# Patient Record
Sex: MALE | Race: WHITE | NOT HISPANIC OR LATINO | Employment: OTHER | URBAN - METROPOLITAN AREA
[De-identification: names, ages, dates, MRNs, and addresses within clinical notes are randomized per-mention and may not be internally consistent; named-entity substitution may affect disease eponyms.]

---

## 2017-04-18 ENCOUNTER — RESULTS ENCOUNTER (OUTPATIENT)
Dept: FAMILY MEDICINE CLINIC | Facility: CLINIC | Age: 67
End: 2017-04-18

## 2017-04-18 DIAGNOSIS — E55.9 VITAMIN D DEFICIENCY: ICD-10-CM

## 2017-04-19 DIAGNOSIS — R73.02 GLUCOSE INTOLERANCE (IMPAIRED GLUCOSE TOLERANCE): ICD-10-CM

## 2017-04-19 DIAGNOSIS — E78.5 HYPERLIPIDEMIA, UNSPECIFIED HYPERLIPIDEMIA TYPE: Primary | ICD-10-CM

## 2017-04-19 DIAGNOSIS — E55.9 VITAMIN D DEFICIENCY: ICD-10-CM

## 2017-04-19 DIAGNOSIS — R94.6 ABNORMAL FINDING ON THYROID FUNCTION TEST: ICD-10-CM

## 2017-04-20 LAB
25(OH)D3+25(OH)D2 SERPL-MCNC: >60 NG/ML
ALBUMIN SERPL-MCNC: 4.1 G/DL (ref 3.5–5.2)
ALBUMIN/GLOB SERPL: 1.2 G/DL
ALP SERPL-CCNC: 30 U/L (ref 40–129)
ALT SERPL-CCNC: 33 U/L (ref 5–41)
AST SERPL-CCNC: 25 U/L (ref 5–40)
BASOPHILS # BLD AUTO: 0.04 10*3/MM3 (ref 0–0.2)
BASOPHILS NFR BLD AUTO: 1.1 % (ref 0–2)
BILIRUB SERPL-MCNC: 0.5 MG/DL (ref 0.2–1.2)
BUN SERPL-MCNC: 17 MG/DL (ref 8–23)
BUN/CREAT SERPL: 15.6 (ref 7–25)
CALCIUM SERPL-MCNC: 9.4 MG/DL (ref 8.8–10.5)
CHLORIDE SERPL-SCNC: 105 MMOL/L (ref 98–107)
CHOLEST SERPL-MCNC: 180 MG/DL (ref 0–200)
CO2 SERPL-SCNC: 25.5 MMOL/L (ref 22–29)
CREAT SERPL-MCNC: 1.09 MG/DL (ref 0.76–1.27)
EOSINOPHIL # BLD AUTO: 0.14 10*3/MM3 (ref 0.1–0.3)
EOSINOPHIL NFR BLD AUTO: 3.9 % (ref 0–4)
ERYTHROCYTE [DISTWIDTH] IN BLOOD BY AUTOMATED COUNT: 13.4 % (ref 11.5–14.5)
GLOBULIN SER CALC-MCNC: 3.4 GM/DL
GLUCOSE SERPL-MCNC: 114 MG/DL (ref 65–99)
HBA1C MFR BLD: 5.7 % (ref 4.8–5.6)
HCT VFR BLD AUTO: 42.3 % (ref 42–52)
HDLC SERPL-MCNC: 54 MG/DL (ref 40–60)
HGB BLD-MCNC: 13.9 G/DL (ref 14–18)
IMM GRANULOCYTES # BLD: 0 10*3/MM3 (ref 0–0.03)
IMM GRANULOCYTES NFR BLD: 0 % (ref 0–0.5)
LDLC SERPL CALC-MCNC: 114 MG/DL (ref 0–100)
LYMPHOCYTES # BLD AUTO: 1.29 10*3/MM3 (ref 0.6–4.8)
LYMPHOCYTES NFR BLD AUTO: 36 % (ref 20–45)
MCH RBC QN AUTO: 29.6 PG (ref 27–31)
MCHC RBC AUTO-ENTMCNC: 32.9 G/DL (ref 31–37)
MCV RBC AUTO: 90.2 FL (ref 80–94)
MONOCYTES # BLD AUTO: 0.42 10*3/MM3 (ref 0–1)
MONOCYTES NFR BLD AUTO: 11.7 % (ref 3–8)
NEUTROPHILS # BLD AUTO: 1.69 10*3/MM3 (ref 1.5–8.3)
NEUTROPHILS NFR BLD AUTO: 47.3 % (ref 45–70)
NRBC BLD AUTO-RTO: 0 /100 WBC (ref 0–0)
PLATELET # BLD AUTO: 277 10*3/MM3 (ref 140–500)
POTASSIUM SERPL-SCNC: 4.5 MMOL/L (ref 3.5–5.2)
PROT SERPL-MCNC: 7.5 G/DL (ref 6–8.5)
RBC # BLD AUTO: 4.69 10*6/MM3 (ref 4.7–6.1)
SODIUM SERPL-SCNC: 141 MMOL/L (ref 136–145)
TRIGL SERPL-MCNC: 60 MG/DL (ref 0–150)
TSH SERPL DL<=0.005 MIU/L-ACNC: 2.87 MIU/ML (ref 0.27–4.2)
VLDLC SERPL CALC-MCNC: 12 MG/DL (ref 8–32)
WBC # BLD AUTO: 3.58 10*3/MM3 (ref 4.8–10.8)

## 2017-04-26 ENCOUNTER — OFFICE VISIT (OUTPATIENT)
Dept: FAMILY MEDICINE CLINIC | Facility: CLINIC | Age: 67
End: 2017-04-26

## 2017-04-26 VITALS
SYSTOLIC BLOOD PRESSURE: 130 MMHG | DIASTOLIC BLOOD PRESSURE: 78 MMHG | TEMPERATURE: 98.1 F | HEART RATE: 82 BPM | WEIGHT: 226 LBS | HEIGHT: 72 IN | OXYGEN SATURATION: 93 % | BODY MASS INDEX: 30.61 KG/M2

## 2017-04-26 DIAGNOSIS — E11.9 CONTROLLED TYPE 2 DIABETES MELLITUS WITHOUT COMPLICATION, WITHOUT LONG-TERM CURRENT USE OF INSULIN (HCC): ICD-10-CM

## 2017-04-26 DIAGNOSIS — E55.9 VITAMIN D DEFICIENCY: ICD-10-CM

## 2017-04-26 DIAGNOSIS — E66.09 EXOGENOUS OBESITY: ICD-10-CM

## 2017-04-26 DIAGNOSIS — Z00.00 HEALTH CARE MAINTENANCE: ICD-10-CM

## 2017-04-26 DIAGNOSIS — E78.5 HYPERLIPIDEMIA, UNSPECIFIED HYPERLIPIDEMIA TYPE: Primary | ICD-10-CM

## 2017-04-26 PROCEDURE — 99213 OFFICE O/P EST LOW 20 MIN: CPT | Performed by: FAMILY MEDICINE

## 2017-04-26 PROCEDURE — G0438 PPPS, INITIAL VISIT: HCPCS | Performed by: FAMILY MEDICINE

## 2017-04-26 RX ORDER — ATORVASTATIN CALCIUM 80 MG/1
80 TABLET, FILM COATED ORAL DAILY
Qty: 90 TABLET | Refills: 1 | Status: SHIPPED | OUTPATIENT
Start: 2017-04-26 | End: 2017-07-19 | Stop reason: SDUPTHER

## 2017-04-26 RX ORDER — METFORMIN HYDROCHLORIDE 500 MG/1
TABLET, EXTENDED RELEASE ORAL
Qty: 180 TABLET | Refills: 1 | Status: SHIPPED | OUTPATIENT
Start: 2017-04-26 | End: 2017-07-19

## 2017-04-26 RX ORDER — SIMVASTATIN 40 MG
40 TABLET ORAL NIGHTLY
Qty: 90 TABLET | Refills: 1 | Status: SHIPPED | OUTPATIENT
Start: 2017-04-26 | End: 2017-04-26

## 2017-04-26 NOTE — PROGRESS NOTES
QUICK REFERENCE INFORMATION:  The ABCs of the Annual Wellness Visit    Initial Medicare Wellness Visit    HEALTH RISK ASSESSMENT    1950    Recent Hospitalizations:  No recent hospitalization(s)..        Current Medical Providers:  Patient Care Team:  Papo Anand DO as PCP - General (Family Medicine)  Joby Schilling MD as PCP - Claims Attributed - PLEASE DO NOT REMOVE        Smoking Status:  History   Smoking Status   • Never Smoker   Smokeless Tobacco   • Not on file       Alcohol Consumption:  History   Alcohol Use   • 0.6 oz/week   • 1 Glasses of wine per week     Comment: I PER DAY       Depression Screen:   PHQ-9 Depression Screening 4/26/2017   Little interest or pleasure in doing things 0   Feeling down, depressed, or hopeless 0   Trouble falling or staying asleep, or sleeping too much 0   Feeling tired or having little energy 0   Poor appetite or overeating 0   Feeling bad about yourself - or that you are a failure or have let yourself or your family down 0   Trouble concentrating on things, such as reading the newspaper or watching television 0   Moving or speaking so slowly that other people could have noticed. Or the opposite - being so fidgety or restless that you have been moving around a lot more than usual 0   Thoughts that you would be better off dead, or of hurting yourself in some way 0   PHQ-9 Total Score 0       Health Habits and Functional and Cognitive Screening:  Functional & Cognitive Status 4/26/2017   Do you have difficulty preparing food and eating? No   Do you have difficulty bathing yourself? No   Do you have difficulty getting dressed? No   Do you have difficulty using the toilet? No   Do you have difficulty moving around from place to place? No   In the past year have you fallen or experienced a near fall? No   Do you need help using the phone?  No   Are you deaf or do you have serious difficulty hearing?  No   Do you need help with transportation? No   Do you need help  shopping? No   Do you need help preparing meals?  No   Do you need help with housework?  No   Do you need help with laundry? No   Do you need help taking your medications? No   Do you need help managing money? No       Health Habits  Current Diet: Well Balanced Diet  Dental Exam: Up to date  Eye Exam: Up to date  Exercise (times per week): 7 times per week          Does the patient have evidence of cognitive impairment? No    Asiprin use counseling: Taking ASA appropriately as indicated      Recent Lab Results:    Visual Acuity:  No exam data present    Age-appropriate Screening Schedule:  Refer to the list below for future screening recommendations based on patient's age, sex and/or medical conditions. Orders for these recommended tests are listed in the plan section. The patient has been provided with a written plan.    Health Maintenance   Topic Date Due   • TDAP/TD VACCINES (1 - Tdap) 07/12/1968   • DIABETIC FOOT EXAM  08/16/2016   • URINE MICROALBUMIN  08/16/2016   • DIABETIC EYE EXAM  10/03/2017   • PNEUMOCOCCAL VACCINES (65+ LOW/MEDIUM RISK) (2 of 2 - PPSV23) 10/18/2017   • LIPID PANEL  04/20/2018   • HEMOGLOBIN A1C  04/20/2018   • COLONOSCOPY  06/29/2026   • INFLUENZA VACCINE  Completed   • ZOSTER VACCINE  Completed        Subjective   History of Present Illness    Krishna Le is a 66 y.o. male who presents for an Annual Wellness Visit.    The following portions of the patient's history were reviewed and updated as appropriate: allergies, current medications, past family history, past medical history, past social history, past surgical history and problem list.    Outpatient Medications Prior to Visit   Medication Sig Dispense Refill   • aspirin 81 MG chewable tablet Chew.     • betamethasone dipropionate (DIPROLENE) 0.05 % ointment Apply  topically 2 (Two) Times a Day.     • cholecalciferol (VITAMIN D3) 1000 UNITS tablet Take 1,000 Units by mouth Daily.     • fenofibrate 160 MG tablet      • metFORMIN  XR (GLUCOPHAGE XR) 500 MG 24 hr tablet Take two tablets at one time each day 180 tablet 1   • Nutritional Supplements (GLUCOSAMINE FORTE) capsule Take  by mouth.     • sildenafil (VIAGRA) 100 MG tablet Take 1 tablet by mouth Daily As Needed for erectile dysfunction. 6 tablet 2   • simvastatin (ZOCOR) 40 MG tablet Take 1 tablet by mouth Every Night. 30 tablet 4   • tacrolimus (PROTOPIC) 0.1 % ointment        No facility-administered medications prior to visit.        Patient Active Problem List   Diagnosis   • Hyperlipidemia   • Abnormal finding on thyroid function test   • Glucose intolerance (impaired glucose tolerance)   • Normocytic normochromic anemia   • Erectile dysfunction   • Vitamin D deficiency       Advance Care Planning:  has an advance directive - a copy HAS NOT been provided    Identification of Risk Factors:  Risk factors include: weight , unhealthy diet and cardiovascular risk.    Review of Systems    Compared to one year ago, the patient feels his physical health is the same.  Compared to one year ago, the patient feels his mental health is the same.    Objective     Physical Exam   Constitutional: He is oriented to person, place, and time. He appears well-developed and well-nourished.   HENT:   Head: Normocephalic and atraumatic.   Neck: Trachea normal and phonation normal. Neck supple. Normal carotid pulses present. Carotid bruit is not present. No thyroid mass and no thyromegaly present.   Cardiovascular: Normal rate, regular rhythm and normal heart sounds.  Exam reveals no gallop and no friction rub.    No murmur heard.  Pulmonary/Chest: Effort normal and breath sounds normal. No respiratory distress. He has no decreased breath sounds. He has no wheezes. He has no rhonchi. He has no rales.   Lymphadenopathy:     He has no cervical adenopathy.   Neurological: He is alert and oriented to person, place, and time.   Skin: Skin is warm and dry. No rash noted.   Psychiatric: He has a normal mood and  "affect. His speech is normal and behavior is normal. Judgment and thought content normal. Cognition and memory are normal.   Nursing note and vitals reviewed.      Vitals:    04/26/17 1010   BP: 130/78   Pulse: 82   Temp: 98.1 °F (36.7 °C)   SpO2: 93%   Weight: 226 lb (103 kg)   Height: 72\" (182.9 cm)       Body mass index is 30.65 kg/(m^2).  Discussed the patient's BMI with him. The BMI is above average; no BMI management plan is appropriate..    Assessment/Plan   Patient Self-Management and Personalized Health Advice  The patient has been provided with information about: diet, exercise and weight management and preventive services including:   · Advance directive.    Visit Diagnoses:  No diagnosis found.    No orders of the defined types were placed in this encounter.      Outpatient Encounter Prescriptions as of 4/26/2017   Medication Sig Dispense Refill   • aspirin 81 MG chewable tablet Chew.     • betamethasone dipropionate (DIPROLENE) 0.05 % ointment Apply  topically 2 (Two) Times a Day.     • cholecalciferol (VITAMIN D3) 1000 UNITS tablet Take 1,000 Units by mouth Daily.     • fenofibrate 160 MG tablet      • metFORMIN XR (GLUCOPHAGE XR) 500 MG 24 hr tablet Take two tablets at one time each day 180 tablet 1   • Nutritional Supplements (GLUCOSAMINE FORTE) capsule Take  by mouth.     • sildenafil (VIAGRA) 100 MG tablet Take 1 tablet by mouth Daily As Needed for erectile dysfunction. 6 tablet 2   • simvastatin (ZOCOR) 40 MG tablet Take 1 tablet by mouth Every Night. 30 tablet 4   • tacrolimus (PROTOPIC) 0.1 % ointment        No facility-administered encounter medications on file as of 4/26/2017.        Reviewed use of high risk medication in the elderly: no  Reviewed for potential of harmful drug interactions in the elderly: no    Follow Up:  No Follow-up on file.     An After Visit Summary and PPPS with all of these plans were given to the patient.        Scribed for Papo Anand MD by Vahe Wilcox. " 04/26/2017    IPapo MD personally performed the services described in this documentation, as scribed by Vahe Wilcox in my presence, and it is both accurate and complete

## 2017-04-26 NOTE — PROGRESS NOTES
Subjective   Krishna Le is a 66 y.o. male with   Chief Complaint   Patient presents with   • Annual Exam     initial medicare wellness   .    History of Present Illness     Krishna is a 66 yr old white male that presents today for follow up of HLD, Vitamin D deficiency, and Type II Diabetes.  Currently Krishna uses Fenofibrate and Simvastatin to control cholesterol, Metformin to control DM.  Krishna sees Dr. Rosalio Weir of Urology. Patient is currently doing well and preparing for an extended vacation.  There are no acute complaints.     The following portions of the patient's history were reviewed and updated as appropriate: allergies, current medications, past family history, past medical history, past social history, past surgical history and problem list.    Review of Systems   Constitutional:        Exogenous obesity   Cardiovascular:        HLD   Endocrine:        Type II Diabetes  Vit. D Def.   Genitourinary:        Erectile dysfunction   All other systems reviewed and are negative.      Objective     Vitals:    04/26/17 1010   BP: 130/78   Pulse: 82   Temp: 98.1 °F (36.7 °C)   SpO2: 93%     BP Readings from Last 3 Encounters:   04/26/17 130/78   10/18/16 130/82   08/16/16 120/78      Wt Readings from Last 3 Encounters:   04/26/17 226 lb (103 kg)   10/18/16 222 lb (101 kg)   08/16/16 217 lb (98.4 kg)          Recent Results (from the past 168 hour(s))   CBC & Differential    Collection Time: 04/20/17  8:37 AM   Result Value Ref Range    WBC 3.58 (L) 4.80 - 10.80 10*3/mm3    RBC 4.69 (L) 4.70 - 6.10 10*6/mm3    Hemoglobin 13.9 (L) 14.0 - 18.0 g/dL    Hematocrit 42.3 42.0 - 52.0 %    MCV 90.2 80.0 - 94.0 fL    MCH 29.6 27.0 - 31.0 pg    MCHC 32.9 31.0 - 37.0 g/dL    RDW 13.4 11.5 - 14.5 %    Platelets 277 140 - 500 10*3/mm3    Neutrophil Rel % 47.3 45.0 - 70.0 %    Lymphocyte Rel % 36.0 20.0 - 45.0 %    Monocyte Rel % 11.7 (H) 3.0 - 8.0 %    Eosinophil Rel % 3.9 0.0 - 4.0 %    Basophil Rel % 1.1 0.0 -  2.0 %    Neutrophils Absolute 1.69 1.50 - 8.30 10*3/mm3    Lymphocytes Absolute 1.29 0.60 - 4.80 10*3/mm3    Monocytes Absolute 0.42 0.00 - 1.00 10*3/mm3    Eosinophils Absolute 0.14 0.10 - 0.30 10*3/mm3    Basophils Absolute 0.04 0.00 - 0.20 10*3/mm3    Immature Granulocyte Rel % 0.0 0.0 - 0.5 %    Immature Grans Absolute 0.00 0.00 - 0.03 10*3/mm3    nRBC 0.0 0.0 - 0.0 /100 WBC   Comprehensive Metabolic Panel    Collection Time: 04/20/17  8:37 AM   Result Value Ref Range    Glucose 114 (H) 65 - 99 mg/dL    BUN 17 8 - 23 mg/dL    Creatinine 1.09 0.76 - 1.27 mg/dL    eGFR Non African Am 67 >60 mL/min/1.73    eGFR African Am 82 >60 mL/min/1.73    BUN/Creatinine Ratio 15.6 7.0 - 25.0    Sodium 141 136 - 145 mmol/L    Potassium 4.5 3.5 - 5.2 mmol/L    Chloride 105 98 - 107 mmol/L    Total CO2 25.5 22.0 - 29.0 mmol/L    Calcium 9.4 8.8 - 10.5 mg/dL    Total Protein 7.5 6.0 - 8.5 g/dL    Albumin 4.10 3.50 - 5.20 g/dL    Globulin 3.4 gm/dL    A/G Ratio 1.2 g/dL    Total Bilirubin 0.5 0.2 - 1.2 mg/dL    Alkaline Phosphatase 30 (L) 40 - 129 U/L    AST (SGOT) 25 5 - 40 U/L    ALT (SGPT) 33 5 - 41 U/L   Hemoglobin A1c    Collection Time: 04/20/17  8:37 AM   Result Value Ref Range    Hemoglobin A1C 5.70 (H) 4.80 - 5.60 %   Lipid Panel    Collection Time: 04/20/17  8:37 AM   Result Value Ref Range    Total Cholesterol 180 0 - 200 mg/dL    Triglycerides 60 0 - 150 mg/dL    HDL Cholesterol 54 40 - 60 mg/dL    VLDL Cholesterol 12 8 - 32 mg/dL    LDL Cholesterol  114 (H) 0 - 100 mg/dL   TSH    Collection Time: 04/20/17  8:37 AM   Result Value Ref Range    TSH 2.870 0.270 - 4.200 mIU/mL   Vitamin D 25 Hydroxy    Collection Time: 04/20/17  8:37 AM   Result Value Ref Range    25 Hydroxy, Vitamin D >60.0 ng/mL     The above results have been reviewed and explained to the patient.  The patient has been provided a copy    Physical Exam   Constitutional: He is oriented to person, place, and time. He appears well-developed and  well-nourished.   HENT:   Head: Normocephalic and atraumatic.   Neck: Trachea normal and phonation normal. Neck supple. Normal carotid pulses present. Carotid bruit is not present. No thyroid mass and no thyromegaly present.   Cardiovascular: Normal rate, regular rhythm and normal heart sounds.  Exam reveals no gallop and no friction rub.    No murmur heard.  Pulmonary/Chest: Effort normal and breath sounds normal. No respiratory distress. He has no decreased breath sounds. He has no wheezes. He has no rhonchi. He has no rales.   Lymphadenopathy:     He has no cervical adenopathy.   Neurological: He is alert and oriented to person, place, and time.   Skin: Skin is warm and dry. No rash noted.   Psychiatric: He has a normal mood and affect. His speech is normal and behavior is normal. Judgment and thought content normal. Cognition and memory are normal.   Nursing note and vitals reviewed.      Assessment/Plan   Krishna was seen today for annual exam.    Diagnoses and all orders for this visit:    Hyperlipidemia, unspecified hyperlipidemia type  -     CBC & Differential; Future  -     Comprehensive Metabolic Panel; Future  -     Lipid Panel; Future    Controlled type 2 diabetes mellitus without complication, without long-term current use of insulin  -     CBC & Differential; Future  -     Comprehensive Metabolic Panel; Future  -     Hemoglobin A1c; Future    Vitamin D deficiency  -     CBC & Differential; Future  -     Comprehensive Metabolic Panel; Future  -     Vitamin D 25 Hydroxy; Future    Health care maintenance  -     PSA; Future    Exogenous obesity    Other orders  -     metFORMIN ER (GLUCOPHAGE XR) 500 MG 24 hr tablet; Take two tablets at one time each day  -     Discontinue: simvastatin (ZOCOR) 40 MG tablet; Take 1 tablet by mouth Every Night.  -     atorvastatin (LIPITOR) 80 MG tablet; Take 1 tablet by mouth Daily.        Return in about 3 months (around 7/26/2017).    Scribed for Papo Anand MD by  Vahe Wilcox. 04/26/2017    I, Papo Anand MD personally performed the services described in this documentation, as scribed by Vahe Wilcox in my presence, and it is both accurate and complete

## 2017-04-27 RX ORDER — FENOFIBRATE 160 MG/1
160 TABLET ORAL DAILY
Qty: 90 TABLET | Refills: 1 | Status: SHIPPED | OUTPATIENT
Start: 2017-04-27 | End: 2017-07-19 | Stop reason: SDUPTHER

## 2017-07-11 DIAGNOSIS — E55.9 VITAMIN D DEFICIENCY: ICD-10-CM

## 2017-07-11 DIAGNOSIS — Z00.00 HEALTH CARE MAINTENANCE: ICD-10-CM

## 2017-07-11 DIAGNOSIS — E78.5 HYPERLIPIDEMIA, UNSPECIFIED HYPERLIPIDEMIA TYPE: ICD-10-CM

## 2017-07-11 DIAGNOSIS — E11.9 CONTROLLED TYPE 2 DIABETES MELLITUS WITHOUT COMPLICATION, WITHOUT LONG-TERM CURRENT USE OF INSULIN (HCC): ICD-10-CM

## 2017-07-11 LAB
25(OH)D3+25(OH)D2 SERPL-MCNC: 57 NG/ML
ALBUMIN SERPL-MCNC: 4.5 G/DL (ref 3.5–5.2)
ALBUMIN/GLOB SERPL: 1.4 G/DL
ALP SERPL-CCNC: 30 U/L (ref 40–129)
ALT SERPL-CCNC: 46 U/L (ref 5–41)
AST SERPL-CCNC: 33 U/L (ref 5–40)
BASOPHILS # BLD AUTO: 0.04 10*3/MM3 (ref 0–0.2)
BASOPHILS NFR BLD AUTO: 0.9 % (ref 0–2)
BILIRUB SERPL-MCNC: 0.6 MG/DL (ref 0.2–1.2)
BUN SERPL-MCNC: 18 MG/DL (ref 8–23)
BUN/CREAT SERPL: 15.9 (ref 7–25)
CALCIUM SERPL-MCNC: 9.6 MG/DL (ref 8.8–10.5)
CHLORIDE SERPL-SCNC: 104 MMOL/L (ref 98–107)
CHOLEST SERPL-MCNC: 169 MG/DL (ref 0–200)
CO2 SERPL-SCNC: 23.3 MMOL/L (ref 22–29)
CREAT SERPL-MCNC: 1.13 MG/DL (ref 0.76–1.27)
EOSINOPHIL # BLD AUTO: 0.16 10*3/MM3 (ref 0.1–0.3)
EOSINOPHIL NFR BLD AUTO: 3.5 % (ref 0–4)
ERYTHROCYTE [DISTWIDTH] IN BLOOD BY AUTOMATED COUNT: 13.6 % (ref 11.5–14.5)
GLOBULIN SER CALC-MCNC: 3.3 GM/DL
GLUCOSE SERPL-MCNC: 95 MG/DL (ref 65–99)
HBA1C MFR BLD: 5.5 % (ref 4.8–5.6)
HCT VFR BLD AUTO: 44.1 % (ref 42–52)
HDLC SERPL-MCNC: 66 MG/DL (ref 40–60)
HGB BLD-MCNC: 14.5 G/DL (ref 14–18)
IMM GRANULOCYTES # BLD: 0.01 10*3/MM3 (ref 0–0.03)
IMM GRANULOCYTES NFR BLD: 0.2 % (ref 0–0.5)
LDLC SERPL CALC-MCNC: 91 MG/DL (ref 0–100)
LYMPHOCYTES # BLD AUTO: 1.54 10*3/MM3 (ref 0.6–4.8)
LYMPHOCYTES NFR BLD AUTO: 33.3 % (ref 20–45)
MCH RBC QN AUTO: 29.8 PG (ref 27–31)
MCHC RBC AUTO-ENTMCNC: 32.9 G/DL (ref 31–37)
MCV RBC AUTO: 90.7 FL (ref 80–94)
MONOCYTES # BLD AUTO: 0.42 10*3/MM3 (ref 0–1)
MONOCYTES NFR BLD AUTO: 9.1 % (ref 3–8)
NEUTROPHILS # BLD AUTO: 2.46 10*3/MM3 (ref 1.5–8.3)
NEUTROPHILS NFR BLD AUTO: 53 % (ref 45–70)
NRBC BLD AUTO-RTO: 0 /100 WBC (ref 0–0)
PLATELET # BLD AUTO: 296 10*3/MM3 (ref 140–500)
POTASSIUM SERPL-SCNC: 4.9 MMOL/L (ref 3.5–5.2)
PROT SERPL-MCNC: 7.8 G/DL (ref 6–8.5)
PSA SERPL-MCNC: 2.63 NG/ML (ref 0–4)
RBC # BLD AUTO: 4.86 10*6/MM3 (ref 4.7–6.1)
SODIUM SERPL-SCNC: 140 MMOL/L (ref 136–145)
TRIGL SERPL-MCNC: 61 MG/DL (ref 0–150)
VLDLC SERPL CALC-MCNC: 12.2 MG/DL (ref 8–32)
WBC # BLD AUTO: 4.63 10*3/MM3 (ref 4.8–10.8)

## 2017-07-19 ENCOUNTER — OFFICE VISIT (OUTPATIENT)
Dept: FAMILY MEDICINE CLINIC | Facility: CLINIC | Age: 67
End: 2017-07-19

## 2017-07-19 VITALS
HEIGHT: 72 IN | HEART RATE: 86 BPM | WEIGHT: 221 LBS | OXYGEN SATURATION: 100 % | BODY MASS INDEX: 29.93 KG/M2 | DIASTOLIC BLOOD PRESSURE: 72 MMHG | TEMPERATURE: 98.5 F | SYSTOLIC BLOOD PRESSURE: 130 MMHG

## 2017-07-19 DIAGNOSIS — E55.9 VITAMIN D DEFICIENCY: ICD-10-CM

## 2017-07-19 DIAGNOSIS — M25.512 CHRONIC PAIN OF BOTH SHOULDERS: ICD-10-CM

## 2017-07-19 DIAGNOSIS — E78.5 HYPERLIPIDEMIA, UNSPECIFIED HYPERLIPIDEMIA TYPE: Primary | ICD-10-CM

## 2017-07-19 DIAGNOSIS — M25.511 CHRONIC PAIN OF BOTH SHOULDERS: ICD-10-CM

## 2017-07-19 DIAGNOSIS — G89.29 CHRONIC PAIN OF BOTH SHOULDERS: ICD-10-CM

## 2017-07-19 DIAGNOSIS — E11.9 CONTROLLED TYPE 2 DIABETES MELLITUS WITHOUT COMPLICATION, WITHOUT LONG-TERM CURRENT USE OF INSULIN (HCC): ICD-10-CM

## 2017-07-19 PROCEDURE — 99213 OFFICE O/P EST LOW 20 MIN: CPT | Performed by: FAMILY MEDICINE

## 2017-07-19 RX ORDER — FENOFIBRATE 160 MG/1
160 TABLET ORAL DAILY
Qty: 90 TABLET | Refills: 1 | Status: SHIPPED | OUTPATIENT
Start: 2017-07-19 | End: 2017-11-15 | Stop reason: SDUPTHER

## 2017-07-19 RX ORDER — ATORVASTATIN CALCIUM 80 MG/1
80 TABLET, FILM COATED ORAL DAILY
Qty: 90 TABLET | Refills: 1 | Status: SHIPPED | OUTPATIENT
Start: 2017-07-19 | End: 2017-11-15 | Stop reason: SDUPTHER

## 2017-11-07 DIAGNOSIS — E55.9 VITAMIN D DEFICIENCY: ICD-10-CM

## 2017-11-07 DIAGNOSIS — E11.9 CONTROLLED TYPE 2 DIABETES MELLITUS WITHOUT COMPLICATION, WITHOUT LONG-TERM CURRENT USE OF INSULIN (HCC): ICD-10-CM

## 2017-11-07 DIAGNOSIS — E78.5 HYPERLIPIDEMIA, UNSPECIFIED HYPERLIPIDEMIA TYPE: ICD-10-CM

## 2017-11-08 LAB
25(OH)D3+25(OH)D2 SERPL-MCNC: 57.4 NG/ML
ALBUMIN SERPL-MCNC: 4.4 G/DL (ref 3.5–5.2)
ALBUMIN/GLOB SERPL: 1.2 G/DL
ALP SERPL-CCNC: 31 U/L (ref 40–129)
ALT SERPL-CCNC: 32 U/L (ref 5–41)
AST SERPL-CCNC: 27 U/L (ref 5–40)
BASOPHILS # BLD AUTO: 0.04 10*3/MM3 (ref 0–0.2)
BASOPHILS NFR BLD AUTO: 1 % (ref 0–2)
BILIRUB SERPL-MCNC: 0.6 MG/DL (ref 0.2–1.2)
BUN SERPL-MCNC: 20 MG/DL (ref 8–23)
BUN/CREAT SERPL: 18.9 (ref 7–25)
CALCIUM SERPL-MCNC: 9.6 MG/DL (ref 8.8–10.5)
CHLORIDE SERPL-SCNC: 104 MMOL/L (ref 98–107)
CHOLEST SERPL-MCNC: 171 MG/DL (ref 0–200)
CO2 SERPL-SCNC: 26.2 MMOL/L (ref 22–29)
CREAT SERPL-MCNC: 1.06 MG/DL (ref 0.76–1.27)
EOSINOPHIL # BLD AUTO: 0.14 10*3/MM3 (ref 0.1–0.3)
EOSINOPHIL NFR BLD AUTO: 3.4 % (ref 0–4)
ERYTHROCYTE [DISTWIDTH] IN BLOOD BY AUTOMATED COUNT: 13.4 % (ref 11.5–14.5)
GFR SERPLBLD CREATININE-BSD FMLA CKD-EPI: 69 ML/MIN/1.73
GFR SERPLBLD CREATININE-BSD FMLA CKD-EPI: 84 ML/MIN/1.73
GLOBULIN SER CALC-MCNC: 3.6 GM/DL
GLUCOSE SERPL-MCNC: 105 MG/DL (ref 65–99)
HBA1C MFR BLD: 6 % (ref 4.8–5.6)
HCT VFR BLD AUTO: 43.7 % (ref 42–52)
HDLC SERPL-MCNC: 56 MG/DL (ref 40–60)
HGB BLD-MCNC: 14.5 G/DL (ref 14–18)
IMM GRANULOCYTES # BLD: 0.01 10*3/MM3 (ref 0–0.03)
IMM GRANULOCYTES NFR BLD: 0.2 % (ref 0–0.5)
LDLC SERPL CALC-MCNC: 103 MG/DL (ref 0–100)
LYMPHOCYTES # BLD AUTO: 1.36 10*3/MM3 (ref 0.6–4.8)
LYMPHOCYTES NFR BLD AUTO: 33.5 % (ref 20–45)
MCH RBC QN AUTO: 30.4 PG (ref 27–31)
MCHC RBC AUTO-ENTMCNC: 33.2 G/DL (ref 31–37)
MCV RBC AUTO: 91.6 FL (ref 80–94)
MONOCYTES # BLD AUTO: 0.42 10*3/MM3 (ref 0–1)
MONOCYTES NFR BLD AUTO: 10.3 % (ref 3–8)
NEUTROPHILS # BLD AUTO: 2.09 10*3/MM3 (ref 1.5–8.3)
NEUTROPHILS NFR BLD AUTO: 51.6 % (ref 45–70)
NRBC BLD AUTO-RTO: 0 /100 WBC (ref 0–0)
PLATELET # BLD AUTO: 260 10*3/MM3 (ref 140–500)
POTASSIUM SERPL-SCNC: 4.7 MMOL/L (ref 3.5–5.2)
PROT SERPL-MCNC: 8 G/DL (ref 6–8.5)
RBC # BLD AUTO: 4.77 10*6/MM3 (ref 4.7–6.1)
SODIUM SERPL-SCNC: 142 MMOL/L (ref 136–145)
TRIGL SERPL-MCNC: 61 MG/DL (ref 0–150)
VLDLC SERPL CALC-MCNC: 12.2 MG/DL (ref 8–32)
WBC # BLD AUTO: 4.06 10*3/MM3 (ref 4.8–10.8)

## 2017-11-15 ENCOUNTER — OFFICE VISIT (OUTPATIENT)
Dept: FAMILY MEDICINE CLINIC | Facility: CLINIC | Age: 67
End: 2017-11-15

## 2017-11-15 VITALS
HEIGHT: 72 IN | BODY MASS INDEX: 31.02 KG/M2 | OXYGEN SATURATION: 97 % | TEMPERATURE: 98.6 F | SYSTOLIC BLOOD PRESSURE: 124 MMHG | DIASTOLIC BLOOD PRESSURE: 74 MMHG | WEIGHT: 229 LBS | HEART RATE: 94 BPM

## 2017-11-15 DIAGNOSIS — E66.09 EXOGENOUS OBESITY: ICD-10-CM

## 2017-11-15 DIAGNOSIS — Z00.00 HEALTH CARE MAINTENANCE: ICD-10-CM

## 2017-11-15 DIAGNOSIS — R94.6 ABNORMAL FINDING ON THYROID FUNCTION TEST: ICD-10-CM

## 2017-11-15 DIAGNOSIS — E55.9 VITAMIN D DEFICIENCY: ICD-10-CM

## 2017-11-15 DIAGNOSIS — R73.02 GLUCOSE INTOLERANCE (IMPAIRED GLUCOSE TOLERANCE): ICD-10-CM

## 2017-11-15 DIAGNOSIS — E78.5 HYPERLIPIDEMIA, UNSPECIFIED HYPERLIPIDEMIA TYPE: Primary | ICD-10-CM

## 2017-11-15 PROCEDURE — 99213 OFFICE O/P EST LOW 20 MIN: CPT | Performed by: FAMILY MEDICINE

## 2017-11-15 RX ORDER — ATORVASTATIN CALCIUM 80 MG/1
80 TABLET, FILM COATED ORAL DAILY
Qty: 90 TABLET | Refills: 1 | Status: SHIPPED | OUTPATIENT
Start: 2017-11-15 | End: 2018-04-17 | Stop reason: SDUPTHER

## 2017-11-15 RX ORDER — FENOFIBRATE 160 MG/1
160 TABLET ORAL DAILY
Qty: 90 TABLET | Refills: 1 | Status: SHIPPED | OUTPATIENT
Start: 2017-11-15 | End: 2018-06-20 | Stop reason: SDUPTHER

## 2017-11-15 NOTE — PROGRESS NOTES
Subjective   Krishna Le is a 67 y.o. male with   Chief Complaint   Patient presents with   • Hyperlipidemia     follow up labs   .    History of Present Illness     Patient presents for follow up of stable HLD, stable Type II Diabetes and stable Vitamin D Deficiency.  Patient continues to tolerate current medications well.  Patient has gained weight since last visit.  He reports that he had a month long trip overseas and has started walking a couple of miles a day since returning.  He was using Metformin but states that he discontinued using it and the pain in his left arm that he was having resolved.  He is otherwise without complaint.     The following portions of the patient's history were reviewed and updated as appropriate: allergies, current medications, past family history, past medical history, past social history, past surgical history and problem list.    Review of Systems   Cardiovascular: Negative for chest pain, palpitations and leg swelling.        HLD   Endocrine: Negative for cold intolerance and heat intolerance.        Type II Diabetes  Vit D Def   All other systems reviewed and are negative.      Objective     Vitals:    11/15/17 1359   BP: 124/74   Pulse: 94   Temp: 98.6 °F (37 °C)   SpO2: 97%     BP Readings from Last 3 Encounters:   11/15/17 124/74   07/19/17 130/72   04/26/17 130/78      Wt Readings from Last 3 Encounters:   11/15/17 229 lb (104 kg)   07/19/17 221 lb (100 kg)   04/26/17 226 lb (103 kg)      Orders Only on 11/07/2017   Component Date Value Ref Range Status   • Total Cholesterol 11/08/2017 171  0 - 200 mg/dL Final   • Triglycerides 11/08/2017 61  0 - 150 mg/dL Final   • HDL Cholesterol 11/08/2017 56  40 - 60 mg/dL Final   • VLDL Cholesterol 11/08/2017 12.2  8 - 32 mg/dL Final   • LDL Cholesterol  11/08/2017 103* 0 - 100 mg/dL Final   • 25 Hydroxy, Vitamin D 11/08/2017 57.4  ng/mL Final    Comment: Reference Range for Total Vitamin D 25(OH)  Deficiency    <20.0  ng/mL  Insufficiency 21-29 ng/mL  Sufficiency   30-74 ng/mL     • WBC 11/08/2017 4.06* 4.80 - 10.80 10*3/mm3 Final   • RBC 11/08/2017 4.77  4.70 - 6.10 10*6/mm3 Final   • Hemoglobin 11/08/2017 14.5  14.0 - 18.0 g/dL Final   • Hematocrit 11/08/2017 43.7  42.0 - 52.0 % Final   • MCV 11/08/2017 91.6  80.0 - 94.0 fL Final   • MCH 11/08/2017 30.4  27.0 - 31.0 pg Final   • MCHC 11/08/2017 33.2  31.0 - 37.0 g/dL Final   • RDW 11/08/2017 13.4  11.5 - 14.5 % Final   • Platelets 11/08/2017 260  140 - 500 10*3/mm3 Final   • Neutrophil Rel % 11/08/2017 51.6  45.0 - 70.0 % Final   • Lymphocyte Rel % 11/08/2017 33.5  20.0 - 45.0 % Final   • Monocyte Rel % 11/08/2017 10.3* 3.0 - 8.0 % Final   • Eosinophil Rel % 11/08/2017 3.4  0.0 - 4.0 % Final   • Basophil Rel % 11/08/2017 1.0  0.0 - 2.0 % Final   • Neutrophils Absolute 11/08/2017 2.09  1.50 - 8.30 10*3/mm3 Final   • Lymphocytes Absolute 11/08/2017 1.36  0.60 - 4.80 10*3/mm3 Final   • Monocytes Absolute 11/08/2017 0.42  0.00 - 1.00 10*3/mm3 Final   • Eosinophils Absolute 11/08/2017 0.14  0.10 - 0.30 10*3/mm3 Final   • Basophils Absolute 11/08/2017 0.04  0.00 - 0.20 10*3/mm3 Final   • Immature Granulocyte Rel % 11/08/2017 0.2  0.0 - 0.5 % Final   • Immature Grans Absolute 11/08/2017 0.01  0.00 - 0.03 10*3/mm3 Final   • nRBC 11/08/2017 0.0  0.0 - 0.0 /100 WBC Final   • Glucose 11/08/2017 105* 65 - 99 mg/dL Final   • BUN 11/08/2017 20  8 - 23 mg/dL Final   • Creatinine 11/08/2017 1.06  0.76 - 1.27 mg/dL Final   • eGFR Non  Am 11/08/2017 69  >60 mL/min/1.73 Final   • eGFR African Am 11/08/2017 84  >60 mL/min/1.73 Final   • BUN/Creatinine Ratio 11/08/2017 18.9  7.0 - 25.0 Final   • Sodium 11/08/2017 142  136 - 145 mmol/L Final   • Potassium 11/08/2017 4.7  3.5 - 5.2 mmol/L Final   • Chloride 11/08/2017 104  98 - 107 mmol/L Final   • Total CO2 11/08/2017 26.2  22.0 - 29.0 mmol/L Final   • Calcium 11/08/2017 9.6  8.8 - 10.5 mg/dL Final   • Total Protein 11/08/2017 8.0  6.0 - 8.5  g/dL Final   • Albumin 11/08/2017 4.40  3.50 - 5.20 g/dL Final   • Globulin 11/08/2017 3.6  gm/dL Final   • A/G Ratio 11/08/2017 1.2  g/dL Final   • Total Bilirubin 11/08/2017 0.6  0.2 - 1.2 mg/dL Final   • Alkaline Phosphatase 11/08/2017 31* 40 - 129 U/L Final   • AST (SGOT) 11/08/2017 27  5 - 40 U/L Final   • ALT (SGPT) 11/08/2017 32  5 - 41 U/L Final   • Hemoglobin A1C 11/08/2017 6.00* 4.80 - 5.60 % Final       Physical Exam   Constitutional: He is oriented to person, place, and time. He appears well-developed and well-nourished.   HENT:   Head: Normocephalic and atraumatic.   Neck: Trachea normal and phonation normal. Neck supple. Normal carotid pulses present. Carotid bruit is not present. No thyroid mass and no thyromegaly present.   Cardiovascular: Normal rate, regular rhythm and normal heart sounds.  Exam reveals no gallop and no friction rub.    No murmur heard.  Pulmonary/Chest: Effort normal and breath sounds normal. No respiratory distress. He has no decreased breath sounds. He has no wheezes. He has no rhonchi. He has no rales.   Lymphadenopathy:     He has no cervical adenopathy.   Neurological: He is alert and oriented to person, place, and time.   Skin: Skin is warm and dry. No rash noted.   Psychiatric: He has a normal mood and affect. His speech is normal and behavior is normal. Judgment and thought content normal. Cognition and memory are normal.   Nursing note and vitals reviewed.      Assessment/Plan   Krishna was seen today for hyperlipidemia.    Diagnoses and all orders for this visit:    Hyperlipidemia, unspecified hyperlipidemia type  -     CBC & Differential; Future  -     Comprehensive Metabolic Panel; Future  -     Lipid Panel; Future    Vitamin D deficiency  -     CBC & Differential; Future  -     Comprehensive Metabolic Panel; Future  -     Vitamin D 25 Hydroxy; Future    Glucose intolerance (impaired glucose tolerance)  -     CBC & Differential; Future  -     Comprehensive Metabolic  Panel; Future  -     Hemoglobin A1c; Future    Abnormal finding on thyroid function test  -     TSH; Future    Health care maintenance  -     Tdap (ADACEL) 5-2-15.5 LF-MCG/0.5 injection; Inject 0.5 mL into the shoulder, thigh, or buttocks 1 (One) Time for 1 dose.    Exogenous obesity    Other orders  -     atorvastatin (LIPITOR) 80 MG tablet; Take 1 tablet by mouth Daily.  -     fenofibrate 160 MG tablet; Take 1 tablet by mouth Daily.        Return in about 5 months (around 4/15/2018).    Scribed for Papo Anand MD by Vahe Wilcox. 11/15/2017    IPapo MD personally performed the services described in this documentation, as scribed by Vahe Wilcox in my presence, and it is both accurate and complete

## 2017-11-18 PROBLEM — E11.9 CONTROLLED TYPE 2 DIABETES MELLITUS WITHOUT COMPLICATION, WITHOUT LONG-TERM CURRENT USE OF INSULIN: Status: RESOLVED | Noted: 2017-04-26 | Resolved: 2017-11-18

## 2018-04-05 DIAGNOSIS — R73.02 GLUCOSE INTOLERANCE (IMPAIRED GLUCOSE TOLERANCE): ICD-10-CM

## 2018-04-05 DIAGNOSIS — E78.5 HYPERLIPIDEMIA, UNSPECIFIED HYPERLIPIDEMIA TYPE: Primary | ICD-10-CM

## 2018-04-05 DIAGNOSIS — E55.9 VITAMIN D DEFICIENCY: ICD-10-CM

## 2018-04-09 LAB
25(OH)D3+25(OH)D2 SERPL-MCNC: 55.7 NG/ML
ALBUMIN SERPL-MCNC: 4.3 G/DL (ref 3.5–5.2)
ALBUMIN/GLOB SERPL: 1.2 G/DL
ALP SERPL-CCNC: 38 U/L (ref 40–129)
ALT SERPL-CCNC: 38 U/L (ref 5–41)
AST SERPL-CCNC: 25 U/L (ref 5–40)
BASOPHILS # BLD AUTO: 0.05 10*3/MM3 (ref 0–0.2)
BASOPHILS NFR BLD AUTO: 1.1 % (ref 0–2)
BILIRUB SERPL-MCNC: 0.5 MG/DL (ref 0.2–1.2)
BUN SERPL-MCNC: 15 MG/DL (ref 8–23)
BUN/CREAT SERPL: 15.3 (ref 7–25)
CALCIUM SERPL-MCNC: 9.8 MG/DL (ref 8.8–10.5)
CHLORIDE SERPL-SCNC: 104 MMOL/L (ref 98–107)
CHOLEST SERPL-MCNC: 179 MG/DL (ref 0–200)
CO2 SERPL-SCNC: 26.9 MMOL/L (ref 22–29)
CREAT SERPL-MCNC: 0.98 MG/DL (ref 0.76–1.27)
EOSINOPHIL # BLD AUTO: 0.15 10*3/MM3 (ref 0.1–0.3)
EOSINOPHIL NFR BLD AUTO: 3.3 % (ref 0–4)
ERYTHROCYTE [DISTWIDTH] IN BLOOD BY AUTOMATED COUNT: 13.6 % (ref 11.5–14.5)
GFR SERPLBLD CREATININE-BSD FMLA CKD-EPI: 76 ML/MIN/1.73
GFR SERPLBLD CREATININE-BSD FMLA CKD-EPI: 92 ML/MIN/1.73
GLOBULIN SER CALC-MCNC: 3.6 GM/DL
GLUCOSE SERPL-MCNC: 125 MG/DL (ref 65–99)
HBA1C MFR BLD: 6 % (ref 4.8–5.6)
HCT VFR BLD AUTO: 46 % (ref 42–52)
HDLC SERPL-MCNC: 55 MG/DL (ref 40–60)
HGB BLD-MCNC: 15.3 G/DL (ref 14–18)
IMM GRANULOCYTES # BLD: 0 10*3/MM3 (ref 0–0.03)
IMM GRANULOCYTES NFR BLD: 0 % (ref 0–0.5)
LDLC SERPL CALC-MCNC: 108 MG/DL (ref 0–100)
LYMPHOCYTES # BLD AUTO: 1.29 10*3/MM3 (ref 0.6–4.8)
LYMPHOCYTES NFR BLD AUTO: 28.6 % (ref 20–45)
MCH RBC QN AUTO: 30.5 PG (ref 27–31)
MCHC RBC AUTO-ENTMCNC: 33.3 G/DL (ref 31–37)
MCV RBC AUTO: 91.8 FL (ref 80–94)
MONOCYTES # BLD AUTO: 0.45 10*3/MM3 (ref 0–1)
MONOCYTES NFR BLD AUTO: 10 % (ref 3–8)
NEUTROPHILS # BLD AUTO: 2.57 10*3/MM3 (ref 1.5–8.3)
NEUTROPHILS NFR BLD AUTO: 57 % (ref 45–70)
NRBC BLD AUTO-RTO: 0 /100 WBC (ref 0–0)
PLATELET # BLD AUTO: 281 10*3/MM3 (ref 140–500)
POTASSIUM SERPL-SCNC: 4.7 MMOL/L (ref 3.5–5.2)
PROT SERPL-MCNC: 7.9 G/DL (ref 6–8.5)
RBC # BLD AUTO: 5.01 10*6/MM3 (ref 4.7–6.1)
SODIUM SERPL-SCNC: 141 MMOL/L (ref 136–145)
TRIGL SERPL-MCNC: 80 MG/DL (ref 0–150)
VLDLC SERPL CALC-MCNC: 16 MG/DL (ref 8–32)
WBC # BLD AUTO: 4.51 10*3/MM3 (ref 4.8–10.8)

## 2018-04-15 ENCOUNTER — RESULTS ENCOUNTER (OUTPATIENT)
Dept: FAMILY MEDICINE CLINIC | Facility: CLINIC | Age: 68
End: 2018-04-15

## 2018-04-15 DIAGNOSIS — E78.5 HYPERLIPIDEMIA, UNSPECIFIED HYPERLIPIDEMIA TYPE: ICD-10-CM

## 2018-04-15 DIAGNOSIS — E55.9 VITAMIN D DEFICIENCY: ICD-10-CM

## 2018-04-15 DIAGNOSIS — R94.6 ABNORMAL FINDING ON THYROID FUNCTION TEST: ICD-10-CM

## 2018-04-15 DIAGNOSIS — R73.02 GLUCOSE INTOLERANCE (IMPAIRED GLUCOSE TOLERANCE): ICD-10-CM

## 2018-04-17 ENCOUNTER — OFFICE VISIT (OUTPATIENT)
Dept: FAMILY MEDICINE CLINIC | Facility: CLINIC | Age: 68
End: 2018-04-17

## 2018-04-17 VITALS
DIASTOLIC BLOOD PRESSURE: 80 MMHG | HEART RATE: 85 BPM | RESPIRATION RATE: 16 BRPM | WEIGHT: 233.5 LBS | HEIGHT: 72 IN | SYSTOLIC BLOOD PRESSURE: 130 MMHG | OXYGEN SATURATION: 94 % | BODY MASS INDEX: 31.63 KG/M2 | TEMPERATURE: 98 F

## 2018-04-17 DIAGNOSIS — E55.9 VITAMIN D DEFICIENCY: ICD-10-CM

## 2018-04-17 DIAGNOSIS — E78.5 HYPERLIPIDEMIA, UNSPECIFIED HYPERLIPIDEMIA TYPE: Primary | ICD-10-CM

## 2018-04-17 DIAGNOSIS — Z12.5 ENCOUNTER FOR SPECIAL SCREENING EXAMINATION FOR NEOPLASM OF PROSTATE: ICD-10-CM

## 2018-04-17 DIAGNOSIS — R73.02 GLUCOSE INTOLERANCE (IMPAIRED GLUCOSE TOLERANCE): ICD-10-CM

## 2018-04-17 DIAGNOSIS — Z00.00 HEALTHCARE MAINTENANCE: ICD-10-CM

## 2018-04-17 PROCEDURE — 99213 OFFICE O/P EST LOW 20 MIN: CPT | Performed by: FAMILY MEDICINE

## 2018-04-17 RX ORDER — ASCORBIC ACID 500 MG
500 TABLET ORAL DAILY
COMMUNITY

## 2018-04-17 RX ORDER — ATORVASTATIN CALCIUM 80 MG/1
80 TABLET, FILM COATED ORAL DAILY
Qty: 90 TABLET | Refills: 1 | Status: SHIPPED | OUTPATIENT
Start: 2018-04-17 | End: 2018-10-23 | Stop reason: SDUPTHER

## 2018-04-17 NOTE — PROGRESS NOTES
Subjective   Krishna Le is a 67 y.o. male with   Chief Complaint   Patient presents with   • Hyperlipidemia     lab f/u   .    History of Present Illness   Pt presents for stable glucose intolerance, stable HLD and stable vitamin D def.  Pt has lost some weight since last visit, he states he is really unable to exercise much at this time.  Pt states the day he stopped taking Metformin his arms stopped hurting.  Pt is tolerating medications well.    The following portions of the patient's history were reviewed and updated as appropriate: allergies, current medications, past family history, past medical history, past social history, past surgical history and problem list.    Review of Systems   Cardiovascular: Negative for chest pain, palpitations and leg swelling.        HLD   Endocrine: Negative for cold intolerance and heat intolerance.        Vitamin D Def.  Glucose intolerance     All other systems reviewed and are negative.      Objective     Vitals:    04/17/18 1337   BP: 130/80   Pulse: 85   Resp: 16   Temp: 98 °F (36.7 °C)   SpO2: 94%     BP Readings from Last 3 Encounters:   04/17/18 130/80   11/15/17 124/74   07/19/17 130/72      Wt Readings from Last 3 Encounters:   04/17/18 106 kg (233 lb 8 oz)   11/15/17 104 kg (229 lb)   07/19/17 100 kg (221 lb)      Orders Only on 04/05/2018   Component Date Value Ref Range Status   • WBC 04/09/2018 4.51* 4.80 - 10.80 10*3/mm3 Final   • RBC 04/09/2018 5.01  4.70 - 6.10 10*6/mm3 Final   • Hemoglobin 04/09/2018 15.3  14.0 - 18.0 g/dL Final   • Hematocrit 04/09/2018 46.0  42.0 - 52.0 % Final   • MCV 04/09/2018 91.8  80.0 - 94.0 fL Final   • MCH 04/09/2018 30.5  27.0 - 31.0 pg Final   • MCHC 04/09/2018 33.3  31.0 - 37.0 g/dL Final   • RDW 04/09/2018 13.6  11.5 - 14.5 % Final   • Platelets 04/09/2018 281  140 - 500 10*3/mm3 Final   • Neutrophil Rel % 04/09/2018 57.0  45.0 - 70.0 % Final   • Lymphocyte Rel % 04/09/2018 28.6  20.0 - 45.0 % Final   • Monocyte Rel %  04/09/2018 10.0* 3.0 - 8.0 % Final   • Eosinophil Rel % 04/09/2018 3.3  0.0 - 4.0 % Final   • Basophil Rel % 04/09/2018 1.1  0.0 - 2.0 % Final   • Neutrophils Absolute 04/09/2018 2.57  1.50 - 8.30 10*3/mm3 Final   • Lymphocytes Absolute 04/09/2018 1.29  0.60 - 4.80 10*3/mm3 Final   • Monocytes Absolute 04/09/2018 0.45  0.00 - 1.00 10*3/mm3 Final   • Eosinophils Absolute 04/09/2018 0.15  0.10 - 0.30 10*3/mm3 Final   • Basophils Absolute 04/09/2018 0.05  0.00 - 0.20 10*3/mm3 Final   • Immature Granulocyte Rel % 04/09/2018 0.0  0.0 - 0.5 % Final   • Immature Grans Absolute 04/09/2018 0.00  0.00 - 0.03 10*3/mm3 Final   • nRBC 04/09/2018 0.0  0.0 - 0.0 /100 WBC Final   • Glucose 04/09/2018 125* 65 - 99 mg/dL Final   • BUN 04/09/2018 15  8 - 23 mg/dL Final   • Creatinine 04/09/2018 0.98  0.76 - 1.27 mg/dL Final   • eGFR Non African Am 04/09/2018 76  >60 mL/min/1.73 Final   • eGFR African Am 04/09/2018 92  >60 mL/min/1.73 Final   • BUN/Creatinine Ratio 04/09/2018 15.3  7.0 - 25.0 Final   • Sodium 04/09/2018 141  136 - 145 mmol/L Final   • Potassium 04/09/2018 4.7  3.5 - 5.2 mmol/L Final   • Chloride 04/09/2018 104  98 - 107 mmol/L Final   • Total CO2 04/09/2018 26.9  22.0 - 29.0 mmol/L Final   • Calcium 04/09/2018 9.8  8.8 - 10.5 mg/dL Final   • Total Protein 04/09/2018 7.9  6.0 - 8.5 g/dL Final   • Albumin 04/09/2018 4.30  3.50 - 5.20 g/dL Final   • Globulin 04/09/2018 3.6  gm/dL Final   • A/G Ratio 04/09/2018 1.2  g/dL Final   • Total Bilirubin 04/09/2018 0.5  0.2 - 1.2 mg/dL Final   • Alkaline Phosphatase 04/09/2018 38* 40 - 129 U/L Final   • AST (SGOT) 04/09/2018 25  5 - 40 U/L Final   • ALT (SGPT) 04/09/2018 38  5 - 41 U/L Final   • Hemoglobin A1C 04/09/2018 6.00* 4.80 - 5.60 % Final   • Total Cholesterol 04/09/2018 179  0 - 200 mg/dL Final   • Triglycerides 04/09/2018 80  0 - 150 mg/dL Final   • HDL Cholesterol 04/09/2018 55  40 - 60 mg/dL Final   • VLDL Cholesterol 04/09/2018 16  8 - 32 mg/dL Final   • LDL  Cholesterol  04/09/2018 108* 0 - 100 mg/dL Final   • 25 Hydroxy, Vitamin D 04/09/2018 55.7  ng/ml Final    Comment: Reference Range for Total Vitamin D 25(OH)  Deficiency    <20.0 ng/mL  Insufficiency 21-29 ng/mL  Sufficiency   30-74 ng/mL         Physical Exam   Constitutional: He is oriented to person, place, and time. He appears well-developed and well-nourished.   HENT:   Head: Normocephalic and atraumatic.   Neck: Trachea normal and phonation normal. Neck supple. Normal carotid pulses present. Carotid bruit is not present. No thyroid mass and no thyromegaly present.   Cardiovascular: Normal rate, regular rhythm and normal heart sounds.  Exam reveals no gallop and no friction rub.    No murmur heard.  Pulmonary/Chest: Effort normal and breath sounds normal. No respiratory distress. He has no decreased breath sounds. He has no wheezes. He has no rhonchi. He has no rales.   Lymphadenopathy:     He has no cervical adenopathy.   Neurological: He is alert and oriented to person, place, and time.   Skin: Skin is warm and dry. No rash noted.   Psychiatric: He has a normal mood and affect. His speech is normal and behavior is normal. Judgment and thought content normal. Cognition and memory are normal.   Nursing note and vitals reviewed.      Assessment/Plan   Krishna was seen today for hyperlipidemia.    Diagnoses and all orders for this visit:    Hyperlipidemia, unspecified hyperlipidemia type  -     Lipid Panel; Future    Vitamin D deficiency  -     Vitamin D 25 Hydroxy; Future    Glucose intolerance (impaired glucose tolerance)  -     CBC & Differential; Future  -     Comprehensive Metabolic Panel; Future    Encounter for special screening examination for neoplasm of prostate    Healthcare maintenance  -     PSA DIAGNOSTIC; Future    Other orders  -     atorvastatin (LIPITOR) 80 MG tablet; Take 1 tablet by mouth Daily.      Patient Instructions     Results for orders placed or performed in visit on 04/05/18    Comprehensive Metabolic Panel   Result Value Ref Range    Glucose 125 (H) 65 - 99 mg/dL    BUN 15 8 - 23 mg/dL    Creatinine 0.98 0.76 - 1.27 mg/dL    eGFR Non African Am 76 >60 mL/min/1.73    eGFR African Am 92 >60 mL/min/1.73    BUN/Creatinine Ratio 15.3 7.0 - 25.0    Sodium 141 136 - 145 mmol/L    Potassium 4.7 3.5 - 5.2 mmol/L    Chloride 104 98 - 107 mmol/L    Total CO2 26.9 22.0 - 29.0 mmol/L    Calcium 9.8 8.8 - 10.5 mg/dL    Total Protein 7.9 6.0 - 8.5 g/dL    Albumin 4.30 3.50 - 5.20 g/dL    Globulin 3.6 gm/dL    A/G Ratio 1.2 g/dL    Total Bilirubin 0.5 0.2 - 1.2 mg/dL    Alkaline Phosphatase 38 (L) 40 - 129 U/L    AST (SGOT) 25 5 - 40 U/L    ALT (SGPT) 38 5 - 41 U/L   Hemoglobin A1c   Result Value Ref Range    Hemoglobin A1C 6.00 (H) 4.80 - 5.60 %   Lipid Panel   Result Value Ref Range    Total Cholesterol 179 0 - 200 mg/dL    Triglycerides 80 0 - 150 mg/dL    HDL Cholesterol 55 40 - 60 mg/dL    VLDL Cholesterol 16 8 - 32 mg/dL    LDL Cholesterol  108 (H) 0 - 100 mg/dL   Vitamin D 25 Hydroxy   Result Value Ref Range    25 Hydroxy, Vitamin D 55.7 ng/ml   CBC & Differential   Result Value Ref Range    WBC 4.51 (L) 4.80 - 10.80 10*3/mm3    RBC 5.01 4.70 - 6.10 10*6/mm3    Hemoglobin 15.3 14.0 - 18.0 g/dL    Hematocrit 46.0 42.0 - 52.0 %    MCV 91.8 80.0 - 94.0 fL    MCH 30.5 27.0 - 31.0 pg    MCHC 33.3 31.0 - 37.0 g/dL    RDW 13.6 11.5 - 14.5 %    Platelets 281 140 - 500 10*3/mm3    Neutrophil Rel % 57.0 45.0 - 70.0 %    Lymphocyte Rel % 28.6 20.0 - 45.0 %    Monocyte Rel % 10.0 (H) 3.0 - 8.0 %    Eosinophil Rel % 3.3 0.0 - 4.0 %    Basophil Rel % 1.1 0.0 - 2.0 %    Neutrophils Absolute 2.57 1.50 - 8.30 10*3/mm3    Lymphocytes Absolute 1.29 0.60 - 4.80 10*3/mm3    Monocytes Absolute 0.45 0.00 - 1.00 10*3/mm3    Eosinophils Absolute 0.15 0.10 - 0.30 10*3/mm3    Basophils Absolute 0.05 0.00 - 0.20 10*3/mm3    Immature Granulocyte Rel % 0.0 0.0 - 0.5 %    Immature Grans Absolute 0.00 0.00 - 0.03 10*3/mm3     nRBC 0.0 0.0 - 0.0 /100 WBC     At this time we will not return patient to metformin and see where his A1c is in 6 months.        Return in about 6 months (around 10/17/2018).      Scribed for Papo Anand MD by Tootie Arteaga CMA. 04/17/2018    IPapo MD personally performed the services described in this documentation, as scribed by oTotie Arteaga CMA in my presence, and it is both accurate and complete

## 2018-04-17 NOTE — PATIENT INSTRUCTIONS
Results for orders placed or performed in visit on 04/05/18   Comprehensive Metabolic Panel   Result Value Ref Range    Glucose 125 (H) 65 - 99 mg/dL    BUN 15 8 - 23 mg/dL    Creatinine 0.98 0.76 - 1.27 mg/dL    eGFR Non African Am 76 >60 mL/min/1.73    eGFR African Am 92 >60 mL/min/1.73    BUN/Creatinine Ratio 15.3 7.0 - 25.0    Sodium 141 136 - 145 mmol/L    Potassium 4.7 3.5 - 5.2 mmol/L    Chloride 104 98 - 107 mmol/L    Total CO2 26.9 22.0 - 29.0 mmol/L    Calcium 9.8 8.8 - 10.5 mg/dL    Total Protein 7.9 6.0 - 8.5 g/dL    Albumin 4.30 3.50 - 5.20 g/dL    Globulin 3.6 gm/dL    A/G Ratio 1.2 g/dL    Total Bilirubin 0.5 0.2 - 1.2 mg/dL    Alkaline Phosphatase 38 (L) 40 - 129 U/L    AST (SGOT) 25 5 - 40 U/L    ALT (SGPT) 38 5 - 41 U/L   Hemoglobin A1c   Result Value Ref Range    Hemoglobin A1C 6.00 (H) 4.80 - 5.60 %   Lipid Panel   Result Value Ref Range    Total Cholesterol 179 0 - 200 mg/dL    Triglycerides 80 0 - 150 mg/dL    HDL Cholesterol 55 40 - 60 mg/dL    VLDL Cholesterol 16 8 - 32 mg/dL    LDL Cholesterol  108 (H) 0 - 100 mg/dL   Vitamin D 25 Hydroxy   Result Value Ref Range    25 Hydroxy, Vitamin D 55.7 ng/ml   CBC & Differential   Result Value Ref Range    WBC 4.51 (L) 4.80 - 10.80 10*3/mm3    RBC 5.01 4.70 - 6.10 10*6/mm3    Hemoglobin 15.3 14.0 - 18.0 g/dL    Hematocrit 46.0 42.0 - 52.0 %    MCV 91.8 80.0 - 94.0 fL    MCH 30.5 27.0 - 31.0 pg    MCHC 33.3 31.0 - 37.0 g/dL    RDW 13.6 11.5 - 14.5 %    Platelets 281 140 - 500 10*3/mm3    Neutrophil Rel % 57.0 45.0 - 70.0 %    Lymphocyte Rel % 28.6 20.0 - 45.0 %    Monocyte Rel % 10.0 (H) 3.0 - 8.0 %    Eosinophil Rel % 3.3 0.0 - 4.0 %    Basophil Rel % 1.1 0.0 - 2.0 %    Neutrophils Absolute 2.57 1.50 - 8.30 10*3/mm3    Lymphocytes Absolute 1.29 0.60 - 4.80 10*3/mm3    Monocytes Absolute 0.45 0.00 - 1.00 10*3/mm3    Eosinophils Absolute 0.15 0.10 - 0.30 10*3/mm3    Basophils Absolute 0.05 0.00 - 0.20 10*3/mm3    Immature Granulocyte Rel % 0.0 0.0 - 0.5  %    Immature Grans Absolute 0.00 0.00 - 0.03 10*3/mm3    nRBC 0.0 0.0 - 0.0 /100 WBC     At this time we will not return patient to metformin and see where his A1c is in 6 months.

## 2018-06-20 RX ORDER — FENOFIBRATE 160 MG/1
TABLET ORAL
Qty: 90 TABLET | Refills: 0 | Status: SHIPPED | OUTPATIENT
Start: 2018-06-20 | End: 2018-09-16 | Stop reason: SDUPTHER

## 2018-09-17 DIAGNOSIS — E55.9 VITAMIN D DEFICIENCY: ICD-10-CM

## 2018-09-17 DIAGNOSIS — E78.5 HYPERLIPIDEMIA, UNSPECIFIED HYPERLIPIDEMIA TYPE: ICD-10-CM

## 2018-09-17 DIAGNOSIS — Z00.00 HEALTHCARE MAINTENANCE: ICD-10-CM

## 2018-09-17 DIAGNOSIS — R73.02 GLUCOSE INTOLERANCE (IMPAIRED GLUCOSE TOLERANCE): Primary | ICD-10-CM

## 2018-09-17 RX ORDER — FENOFIBRATE 160 MG/1
TABLET ORAL
Qty: 90 TABLET | Refills: 0 | Status: SHIPPED | OUTPATIENT
Start: 2018-09-17 | End: 2018-12-12 | Stop reason: SDUPTHER

## 2018-10-16 LAB
ALBUMIN SERPL-MCNC: 4.4 G/DL (ref 3.5–5.2)
ALBUMIN/GLOB SERPL: 1.1 G/DL
ALP SERPL-CCNC: 33 U/L (ref 40–129)
ALT SERPL-CCNC: 40 U/L (ref 5–41)
AST SERPL-CCNC: 28 U/L (ref 5–40)
BASOPHILS # BLD AUTO: 0.05 10*3/MM3 (ref 0–0.2)
BASOPHILS NFR BLD AUTO: 1 % (ref 0–2)
BILIRUB SERPL-MCNC: 0.6 MG/DL (ref 0.2–1.2)
BUN SERPL-MCNC: 19 MG/DL (ref 8–23)
BUN/CREAT SERPL: 18.1 (ref 7–25)
CALCIUM SERPL-MCNC: 9.4 MG/DL (ref 8.8–10.5)
CHLORIDE SERPL-SCNC: 105 MMOL/L (ref 98–107)
CHOLEST SERPL-MCNC: 164 MG/DL (ref 0–200)
CO2 SERPL-SCNC: 23 MMOL/L (ref 22–29)
CREAT SERPL-MCNC: 1.05 MG/DL (ref 0.76–1.27)
EOSINOPHIL # BLD AUTO: 0.17 10*3/MM3 (ref 0.1–0.3)
EOSINOPHIL NFR BLD AUTO: 3.5 % (ref 0–4)
ERYTHROCYTE [DISTWIDTH] IN BLOOD BY AUTOMATED COUNT: 12.9 % (ref 11.5–14.5)
GLOBULIN SER CALC-MCNC: 3.9 GM/DL
GLUCOSE SERPL-MCNC: 118 MG/DL (ref 65–99)
HBA1C MFR BLD: 6.2 % (ref 4.8–5.6)
HCT VFR BLD AUTO: 43.2 % (ref 42–52)
HDLC SERPL-MCNC: 58 MG/DL (ref 40–60)
HGB BLD-MCNC: 14.5 G/DL (ref 14–18)
IMM GRANULOCYTES # BLD: 0.01 10*3/MM3 (ref 0–0.03)
IMM GRANULOCYTES NFR BLD: 0.2 % (ref 0–0.5)
LDLC SERPL CALC-MCNC: 89 MG/DL (ref 0–100)
LYMPHOCYTES # BLD AUTO: 1.42 10*3/MM3 (ref 0.6–4.8)
LYMPHOCYTES NFR BLD AUTO: 29.6 % (ref 20–45)
MCH RBC QN AUTO: 30.5 PG (ref 27–31)
MCHC RBC AUTO-ENTMCNC: 33.6 G/DL (ref 31–37)
MCV RBC AUTO: 90.9 FL (ref 80–94)
MONOCYTES # BLD AUTO: 0.47 10*3/MM3 (ref 0–1)
MONOCYTES NFR BLD AUTO: 9.8 % (ref 3–8)
NEUTROPHILS # BLD AUTO: 2.67 10*3/MM3 (ref 1.5–8.3)
NEUTROPHILS NFR BLD AUTO: 55.9 % (ref 45–70)
NRBC BLD AUTO-RTO: 0 /100 WBC (ref 0–0)
PLATELET # BLD AUTO: 253 10*3/MM3 (ref 140–500)
POTASSIUM SERPL-SCNC: 4.1 MMOL/L (ref 3.5–5.2)
PROT SERPL-MCNC: 8.3 G/DL (ref 6–8.5)
PSA SERPL-MCNC: 3.35 NG/ML (ref 0–4)
RBC # BLD AUTO: 4.75 10*6/MM3 (ref 4.7–6.1)
SODIUM SERPL-SCNC: 140 MMOL/L (ref 136–145)
TRIGL SERPL-MCNC: 83 MG/DL (ref 0–150)
VLDLC SERPL CALC-MCNC: 16.6 MG/DL (ref 8–32)
WBC # BLD AUTO: 4.79 10*3/MM3 (ref 4.8–10.8)

## 2018-10-17 LAB
25(OH)D3+25(OH)D2 SERPL-MCNC: 74.2 NG/ML (ref 30–100)
Lab: NORMAL

## 2018-10-23 ENCOUNTER — OFFICE VISIT (OUTPATIENT)
Dept: FAMILY MEDICINE CLINIC | Facility: CLINIC | Age: 68
End: 2018-10-23

## 2018-10-23 VITALS
SYSTOLIC BLOOD PRESSURE: 124 MMHG | DIASTOLIC BLOOD PRESSURE: 80 MMHG | WEIGHT: 211 LBS | HEIGHT: 72 IN | BODY MASS INDEX: 28.58 KG/M2 | HEART RATE: 71 BPM | RESPIRATION RATE: 16 BRPM | OXYGEN SATURATION: 98 % | TEMPERATURE: 98 F

## 2018-10-23 DIAGNOSIS — E55.9 VITAMIN D DEFICIENCY: ICD-10-CM

## 2018-10-23 DIAGNOSIS — R73.02 GLUCOSE INTOLERANCE (IMPAIRED GLUCOSE TOLERANCE): ICD-10-CM

## 2018-10-23 DIAGNOSIS — L30.9 ECZEMA OF BOTH HANDS: ICD-10-CM

## 2018-10-23 DIAGNOSIS — E78.2 MIXED HYPERLIPIDEMIA: Primary | ICD-10-CM

## 2018-10-23 DIAGNOSIS — M72.0 DUPUYTREN'S CONTRACTURE: ICD-10-CM

## 2018-10-23 DIAGNOSIS — E66.09 EXOGENOUS OBESITY: ICD-10-CM

## 2018-10-23 PROCEDURE — 99213 OFFICE O/P EST LOW 20 MIN: CPT | Performed by: FAMILY MEDICINE

## 2018-10-23 RX ORDER — ATORVASTATIN CALCIUM 80 MG/1
80 TABLET, FILM COATED ORAL DAILY
Qty: 90 TABLET | Refills: 1 | Status: SHIPPED | OUTPATIENT
Start: 2018-10-23 | End: 2019-04-24 | Stop reason: SDUPTHER

## 2018-10-23 NOTE — PROGRESS NOTES
Subjective   Krishna Le is a 68 y.o. male with   Chief Complaint   Patient presents with   • Follow-up     on labs   • Diabetes   .    History of Present Illness     Pt is a 67 yo white male who presents for follow up on fasting labs done for stable HLD, stable glucose intolerance and stable Vitamin D Def.  Pts blood pressure is stable today.  He has a history of exogenous obesity and appears to have about a 20 pound weight loss since last visit..  Pt states he avoids all sugar and he eats no sweets or desserts.  Pt is currently prescribed Atorvastatin 80mg, Diprolene ointment, Fenofibrate 160mg and Protopic ointment.  He also takes several supplements.  All of which he tolerates well.  He complains today of eczema in his hands that is quite painful.  He had some allergy testing with Dr Kendall and he tested positive to an ingredient in many soaps.  Pt also complains of problems with his right ring finger stating it will not flex as needed.  He requests a referral for that.  Pt see's Dr Rosalio Weir of Urology who is following his PSA every 6 months.    The following portions of the patient's history were reviewed and updated as appropriate: allergies, current medications, past family history, past medical history, past social history, past surgical history and problem list.    Review of Systems   Cardiovascular: Negative for chest pain, palpitations and leg swelling.        HLD   Endocrine: Negative for cold intolerance and heat intolerance.        Glucose Intolerance  Vitamin D Def   Musculoskeletal: Positive for myalgias.        Right ring finger   Skin:        Eczema hands   All other systems reviewed and are negative.      Objective     Vitals:    10/23/18 0724   BP: 124/80   Pulse: 71   Resp: 16   Temp: 98 °F (36.7 °C)   SpO2: 98%     BP Readings from Last 3 Encounters:   10/23/18 124/80   04/17/18 130/80   11/15/17 124/74      Wt Readings from Last 3 Encounters:   10/23/18 95.7 kg (211 lb)    04/17/18 106 kg (233 lb 8 oz)   11/15/17 104 kg (229 lb)        Orders Only on 09/17/2018   Component Date Value Ref Range Status   • WBC 10/16/2018 4.79* 4.80 - 10.80 10*3/mm3 Final   • RBC 10/16/2018 4.75  4.70 - 6.10 10*6/mm3 Final   • Hemoglobin 10/16/2018 14.5  14.0 - 18.0 g/dL Final   • Hematocrit 10/16/2018 43.2  42.0 - 52.0 % Final   • MCV 10/16/2018 90.9  80.0 - 94.0 fL Final   • MCH 10/16/2018 30.5  27.0 - 31.0 pg Final   • MCHC 10/16/2018 33.6  31.0 - 37.0 g/dL Final   • RDW 10/16/2018 12.9  11.5 - 14.5 % Final   • Platelets 10/16/2018 253  140 - 500 10*3/mm3 Final   • Neutrophil Rel % 10/16/2018 55.9  45.0 - 70.0 % Final   • Lymphocyte Rel % 10/16/2018 29.6  20.0 - 45.0 % Final   • Monocyte Rel % 10/16/2018 9.8* 3.0 - 8.0 % Final   • Eosinophil Rel % 10/16/2018 3.5  0.0 - 4.0 % Final   • Basophil Rel % 10/16/2018 1.0  0.0 - 2.0 % Final   • Neutrophils Absolute 10/16/2018 2.67  1.50 - 8.30 10*3/mm3 Final   • Lymphocytes Absolute 10/16/2018 1.42  0.60 - 4.80 10*3/mm3 Final   • Monocytes Absolute 10/16/2018 0.47  0.00 - 1.00 10*3/mm3 Final   • Eosinophils Absolute 10/16/2018 0.17  0.10 - 0.30 10*3/mm3 Final   • Basophils Absolute 10/16/2018 0.05  0.00 - 0.20 10*3/mm3 Final   • Immature Granulocyte Rel % 10/16/2018 0.2  0.0 - 0.5 % Final   • Immature Grans Absolute 10/16/2018 0.01  0.00 - 0.03 10*3/mm3 Final   • nRBC 10/16/2018 0.0  0.0 - 0.0 /100 WBC Final   • Glucose 10/16/2018 118* 65 - 99 mg/dL Final   • BUN 10/16/2018 19  8 - 23 mg/dL Final   • Creatinine 10/16/2018 1.05  0.76 - 1.27 mg/dL Final   • eGFR Non  Am 10/16/2018 70  >60 mL/min/1.73 Final   • eGFR African Am 10/16/2018 85  >60 mL/min/1.73 Final   • BUN/Creatinine Ratio 10/16/2018 18.1  7.0 - 25.0 Final   • Sodium 10/16/2018 140  136 - 145 mmol/L Final   • Potassium 10/16/2018 4.1  3.5 - 5.2 mmol/L Final   • Chloride 10/16/2018 105  98 - 107 mmol/L Final   • Total CO2 10/16/2018 23.0  22.0 - 29.0 mmol/L Final   • Calcium 10/16/2018 9.4   8.8 - 10.5 mg/dL Final   • Total Protein 10/16/2018 8.3  6.0 - 8.5 g/dL Final   • Albumin 10/16/2018 4.40  3.50 - 5.20 g/dL Final   • Globulin 10/16/2018 3.9  gm/dL Final   • A/G Ratio 10/16/2018 1.1  g/dL Final   • Total Bilirubin 10/16/2018 0.6  0.2 - 1.2 mg/dL Final   • Alkaline Phosphatase 10/16/2018 33* 40 - 129 U/L Final   • AST (SGOT) 10/16/2018 28  5 - 40 U/L Final   • ALT (SGPT) 10/16/2018 40  5 - 41 U/L Final   • Total Cholesterol 10/16/2018 164  0 - 200 mg/dL Final   • Triglycerides 10/16/2018 83  0 - 150 mg/dL Final   • HDL Cholesterol 10/16/2018 58  40 - 60 mg/dL Final   • VLDL Cholesterol 10/16/2018 16.6  8 - 32 mg/dL Final   • LDL Cholesterol  10/16/2018 89  0 - 100 mg/dL Final   • PSA 10/16/2018 3.350  0.000 - 4.000 ng/mL Final    Comment: The total PSA (tPSA) method performed at [facility] is a  quantitative electrochemiluminescence immunoassay 'ECLIA'     • 25 Hydroxy, Vitamin D 10/16/2018 74.2  30.0 - 100.0 ng/mL Final    Comment: Vitamin D deficiency has been defined by the [place] [employer] and an Endocrine Society practice guideline as a  level of serum 25-OH vitamin D less than 20 ng/mL (1,2).  The Endocrine Society went on to further define vitamin D  insufficiency as a level between 21 and 29 ng/mL (2).  1. IOM ([place] [employer]). 2010. Dietary reference     intakes for calcium and D. [place]: The     National Academies Press.  2. Miriam MF, Eran NC, Esequiel INIGUEZ, et al.     Evaluation, treatment, and prevention of vitamin D     deficiency: an Endocrine Society clinical practice     guideline. JCEM. 2011 Jul; 96(7):1911-30.     • Hemoglobin A1C 10/16/2018 6.20* 4.80 - 5.60 % Final   • Please note 10/16/2018 Comment   Final    Comment: The date and/or time of collection was not indicated on the  requisition as required by state and federal law.  The date of  receipt of the specimen was used as the collection date if not  supplied.           Physical Exam   Constitutional:  He is oriented to person, place, and time. He appears well-developed and well-nourished.   HENT:   Head: Normocephalic and atraumatic.   Neck: Trachea normal and phonation normal. Neck supple. Normal carotid pulses present. Carotid bruit is not present. No thyroid mass and no thyromegaly present.   Cardiovascular: Normal rate, regular rhythm and normal heart sounds.  Exam reveals no gallop and no friction rub.    No murmur heard.  Pulmonary/Chest: Effort normal and breath sounds normal. No respiratory distress. He has no decreased breath sounds. He has no wheezes. He has no rhonchi. He has no rales.   Musculoskeletal:   Decreased ROM right ring finger-with fibrosis along the flexor tendon in the palmar region.   Lymphadenopathy:     He has no cervical adenopathy.   Neurological: He is alert and oriented to person, place, and time.   Skin: Skin is warm and dry. No rash noted.   Psychiatric: He has a normal mood and affect. His speech is normal and behavior is normal. Judgment and thought content normal. Cognition and memory are normal.   Nursing note and vitals reviewed.      Assessment/Plan   Krishna was seen today for follow-up and diabetes.    Diagnoses and all orders for this visit:    Mixed hyperlipidemia  -     CBC & Differential; Future  -     Comprehensive Metabolic Panel; Future  -     Lipid Panel; Future    Glucose intolerance (impaired glucose tolerance)  -     CBC & Differential; Future  -     Comprehensive Metabolic Panel; Future  -     Hemoglobin A1c; Future    Vitamin D deficiency  -     Vitamin D 25 Hydroxy; Future    Exogenous obesity    Eczema of both hands    Dupuytren's contracture  -     Ambulatory Referral to Hand Surgery    Other orders  -     atorvastatin (LIPITOR) 80 MG tablet; Take 1 tablet by mouth Daily.        Patient Instructions     Results for orders placed or performed in visit on 09/17/18   Comprehensive Metabolic Panel   Result Value Ref Range    Glucose 118 (H) 65 - 99 mg/dL    BUN  19 8 - 23 mg/dL    Creatinine 1.05 0.76 - 1.27 mg/dL    eGFR Non African Am 70 >60 mL/min/1.73    eGFR African Am 85 >60 mL/min/1.73    BUN/Creatinine Ratio 18.1 7.0 - 25.0    Sodium 140 136 - 145 mmol/L    Potassium 4.1 3.5 - 5.2 mmol/L    Chloride 105 98 - 107 mmol/L    Total CO2 23.0 22.0 - 29.0 mmol/L    Calcium 9.4 8.8 - 10.5 mg/dL    Total Protein 8.3 6.0 - 8.5 g/dL    Albumin 4.40 3.50 - 5.20 g/dL    Globulin 3.9 gm/dL    A/G Ratio 1.1 g/dL    Total Bilirubin 0.6 0.2 - 1.2 mg/dL    Alkaline Phosphatase 33 (L) 40 - 129 U/L    AST (SGOT) 28 5 - 40 U/L    ALT (SGPT) 40 5 - 41 U/L   Lipid Panel   Result Value Ref Range    Total Cholesterol 164 0 - 200 mg/dL    Triglycerides 83 0 - 150 mg/dL    HDL Cholesterol 58 40 - 60 mg/dL    VLDL Cholesterol 16.6 8 - 32 mg/dL    LDL Cholesterol  89 0 - 100 mg/dL   PSA DIAGNOSTIC   Result Value Ref Range    PSA 3.350 0.000 - 4.000 ng/mL   Vitamin D 25 Hydroxy   Result Value Ref Range    25 Hydroxy, Vitamin D 74.2 30.0 - 100.0 ng/mL   Hemoglobin A1c   Result Value Ref Range    Hemoglobin A1C 6.20 (H) 4.80 - 5.60 %   Please Note   Result Value Ref Range    Please note Comment    CBC & Differential   Result Value Ref Range    WBC 4.79 (L) 4.80 - 10.80 10*3/mm3    RBC 4.75 4.70 - 6.10 10*6/mm3    Hemoglobin 14.5 14.0 - 18.0 g/dL    Hematocrit 43.2 42.0 - 52.0 %    MCV 90.9 80.0 - 94.0 fL    MCH 30.5 27.0 - 31.0 pg    MCHC 33.6 31.0 - 37.0 g/dL    RDW 12.9 11.5 - 14.5 %    Platelets 253 140 - 500 10*3/mm3    Neutrophil Rel % 55.9 45.0 - 70.0 %    Lymphocyte Rel % 29.6 20.0 - 45.0 %    Monocyte Rel % 9.8 (H) 3.0 - 8.0 %    Eosinophil Rel % 3.5 0.0 - 4.0 %    Basophil Rel % 1.0 0.0 - 2.0 %    Neutrophils Absolute 2.67 1.50 - 8.30 10*3/mm3    Lymphocytes Absolute 1.42 0.60 - 4.80 10*3/mm3    Monocytes Absolute 0.47 0.00 - 1.00 10*3/mm3    Eosinophils Absolute 0.17 0.10 - 0.30 10*3/mm3    Basophils Absolute 0.05 0.00 - 0.20 10*3/mm3    Immature Granulocyte Rel % 0.2 0.0 - 0.5 %     Immature Grans Absolute 0.01 0.00 - 0.03 10*3/mm3    nRBC 0.0 0.0 - 0.0 /100 WBC         Return in about 6 months (around 4/23/2019).    Scribed for Papo Anand MD by Tootie Arteaga CMA. 10/23/2018    I, Papo Anand MD personally performed the services described in this documentation, as scribed by Tootie Arteaga CMA in my presence, and it is both accurate and complete

## 2018-10-23 NOTE — PATIENT INSTRUCTIONS
Results for orders placed or performed in visit on 09/17/18   Comprehensive Metabolic Panel   Result Value Ref Range    Glucose 118 (H) 65 - 99 mg/dL    BUN 19 8 - 23 mg/dL    Creatinine 1.05 0.76 - 1.27 mg/dL    eGFR Non African Am 70 >60 mL/min/1.73    eGFR African Am 85 >60 mL/min/1.73    BUN/Creatinine Ratio 18.1 7.0 - 25.0    Sodium 140 136 - 145 mmol/L    Potassium 4.1 3.5 - 5.2 mmol/L    Chloride 105 98 - 107 mmol/L    Total CO2 23.0 22.0 - 29.0 mmol/L    Calcium 9.4 8.8 - 10.5 mg/dL    Total Protein 8.3 6.0 - 8.5 g/dL    Albumin 4.40 3.50 - 5.20 g/dL    Globulin 3.9 gm/dL    A/G Ratio 1.1 g/dL    Total Bilirubin 0.6 0.2 - 1.2 mg/dL    Alkaline Phosphatase 33 (L) 40 - 129 U/L    AST (SGOT) 28 5 - 40 U/L    ALT (SGPT) 40 5 - 41 U/L   Lipid Panel   Result Value Ref Range    Total Cholesterol 164 0 - 200 mg/dL    Triglycerides 83 0 - 150 mg/dL    HDL Cholesterol 58 40 - 60 mg/dL    VLDL Cholesterol 16.6 8 - 32 mg/dL    LDL Cholesterol  89 0 - 100 mg/dL   PSA DIAGNOSTIC   Result Value Ref Range    PSA 3.350 0.000 - 4.000 ng/mL   Vitamin D 25 Hydroxy   Result Value Ref Range    25 Hydroxy, Vitamin D 74.2 30.0 - 100.0 ng/mL   Hemoglobin A1c   Result Value Ref Range    Hemoglobin A1C 6.20 (H) 4.80 - 5.60 %   Please Note   Result Value Ref Range    Please note Comment    CBC & Differential   Result Value Ref Range    WBC 4.79 (L) 4.80 - 10.80 10*3/mm3    RBC 4.75 4.70 - 6.10 10*6/mm3    Hemoglobin 14.5 14.0 - 18.0 g/dL    Hematocrit 43.2 42.0 - 52.0 %    MCV 90.9 80.0 - 94.0 fL    MCH 30.5 27.0 - 31.0 pg    MCHC 33.6 31.0 - 37.0 g/dL    RDW 12.9 11.5 - 14.5 %    Platelets 253 140 - 500 10*3/mm3    Neutrophil Rel % 55.9 45.0 - 70.0 %    Lymphocyte Rel % 29.6 20.0 - 45.0 %    Monocyte Rel % 9.8 (H) 3.0 - 8.0 %    Eosinophil Rel % 3.5 0.0 - 4.0 %    Basophil Rel % 1.0 0.0 - 2.0 %    Neutrophils Absolute 2.67 1.50 - 8.30 10*3/mm3    Lymphocytes Absolute 1.42 0.60 - 4.80 10*3/mm3    Monocytes Absolute 0.47 0.00 - 1.00  10*3/mm3    Eosinophils Absolute 0.17 0.10 - 0.30 10*3/mm3    Basophils Absolute 0.05 0.00 - 0.20 10*3/mm3    Immature Granulocyte Rel % 0.2 0.0 - 0.5 %    Immature Grans Absolute 0.01 0.00 - 0.03 10*3/mm3    nRBC 0.0 0.0 - 0.0 /100 WBC

## 2018-12-12 RX ORDER — FENOFIBRATE 160 MG/1
TABLET ORAL
Qty: 90 TABLET | Refills: 0 | Status: SHIPPED | OUTPATIENT
Start: 2018-12-12 | End: 2019-03-08 | Stop reason: SDUPTHER

## 2019-03-08 RX ORDER — FENOFIBRATE 160 MG/1
TABLET ORAL
Qty: 90 TABLET | Refills: 0 | Status: SHIPPED | OUTPATIENT
Start: 2019-03-08 | End: 2019-06-02 | Stop reason: SDUPTHER

## 2019-04-02 DIAGNOSIS — E55.9 VITAMIN D DEFICIENCY: ICD-10-CM

## 2019-04-02 DIAGNOSIS — E78.2 MIXED HYPERLIPIDEMIA: ICD-10-CM

## 2019-04-02 DIAGNOSIS — R73.02 GLUCOSE INTOLERANCE (IMPAIRED GLUCOSE TOLERANCE): ICD-10-CM

## 2019-04-16 LAB
25(OH)D3+25(OH)D2 SERPL-MCNC: 95.3 NG/ML (ref 30–100)
ALBUMIN SERPL-MCNC: 4.8 G/DL (ref 3.5–5.2)
ALBUMIN/GLOB SERPL: 1.3 G/DL
ALP SERPL-CCNC: 36 U/L (ref 39–117)
ALT SERPL-CCNC: 57 U/L (ref 1–41)
AST SERPL-CCNC: 38 U/L (ref 1–40)
BASOPHILS # BLD AUTO: 0.05 10*3/MM3 (ref 0–0.2)
BASOPHILS NFR BLD AUTO: 1 % (ref 0–1.5)
BILIRUB SERPL-MCNC: 0.6 MG/DL (ref 0.2–1.2)
BUN SERPL-MCNC: 17 MG/DL (ref 8–23)
BUN/CREAT SERPL: 14.7 (ref 7–25)
CALCIUM SERPL-MCNC: 9.9 MG/DL (ref 8.6–10.5)
CHLORIDE SERPL-SCNC: 105 MMOL/L (ref 98–107)
CHOLEST SERPL-MCNC: 160 MG/DL (ref 0–200)
CO2 SERPL-SCNC: 23.4 MMOL/L (ref 22–29)
CREAT SERPL-MCNC: 1.16 MG/DL (ref 0.76–1.27)
EOSINOPHIL # BLD AUTO: 0.17 10*3/MM3 (ref 0–0.4)
EOSINOPHIL NFR BLD AUTO: 3.4 % (ref 0.3–6.2)
ERYTHROCYTE [DISTWIDTH] IN BLOOD BY AUTOMATED COUNT: 13.2 % (ref 12.3–15.4)
GLOBULIN SER CALC-MCNC: 3.6 GM/DL
GLUCOSE SERPL-MCNC: 129 MG/DL (ref 65–99)
HBA1C MFR BLD: 6.4 % (ref 4.8–5.6)
HCT VFR BLD AUTO: 45.5 % (ref 37.5–51)
HDLC SERPL-MCNC: 49 MG/DL (ref 40–60)
HGB BLD-MCNC: 14.8 G/DL (ref 13–17.7)
IMM GRANULOCYTES # BLD AUTO: 0.01 10*3/MM3 (ref 0–0.05)
IMM GRANULOCYTES NFR BLD AUTO: 0.2 % (ref 0–0.5)
LDLC SERPL CALC-MCNC: 95 MG/DL (ref 0–100)
LYMPHOCYTES # BLD AUTO: 1.46 10*3/MM3 (ref 0.7–3.1)
LYMPHOCYTES NFR BLD AUTO: 29.5 % (ref 19.6–45.3)
MCH RBC QN AUTO: 30.1 PG (ref 26.6–33)
MCHC RBC AUTO-ENTMCNC: 32.5 G/DL (ref 31.5–35.7)
MCV RBC AUTO: 92.5 FL (ref 79–97)
MONOCYTES # BLD AUTO: 0.56 10*3/MM3 (ref 0.1–0.9)
MONOCYTES NFR BLD AUTO: 11.3 % (ref 5–12)
NEUTROPHILS # BLD AUTO: 2.7 10*3/MM3 (ref 1.4–7)
NEUTROPHILS NFR BLD AUTO: 54.6 % (ref 42.7–76)
NRBC BLD AUTO-RTO: 0 /100 WBC (ref 0–0)
PLATELET # BLD AUTO: 275 10*3/MM3 (ref 140–450)
POTASSIUM SERPL-SCNC: 4.4 MMOL/L (ref 3.5–5.2)
PROT SERPL-MCNC: 8.4 G/DL (ref 6–8.5)
RBC # BLD AUTO: 4.92 10*6/MM3 (ref 4.14–5.8)
SODIUM SERPL-SCNC: 140 MMOL/L (ref 136–145)
TRIGL SERPL-MCNC: 82 MG/DL (ref 0–150)
VLDLC SERPL CALC-MCNC: 16.4 MG/DL
WBC # BLD AUTO: 4.95 10*3/MM3 (ref 3.4–10.8)

## 2019-04-24 ENCOUNTER — HOSPITAL ENCOUNTER (OUTPATIENT)
Dept: GENERAL RADIOLOGY | Facility: HOSPITAL | Age: 69
Discharge: HOME | End: 2019-04-24
Admitting: FAMILY MEDICINE

## 2019-04-24 ENCOUNTER — OFFICE VISIT (OUTPATIENT)
Dept: FAMILY MEDICINE CLINIC | Facility: CLINIC | Age: 69
End: 2019-04-24

## 2019-04-24 VITALS
SYSTOLIC BLOOD PRESSURE: 140 MMHG | WEIGHT: 212 LBS | OXYGEN SATURATION: 99 % | DIASTOLIC BLOOD PRESSURE: 80 MMHG | HEIGHT: 72 IN | TEMPERATURE: 98.3 F | RESPIRATION RATE: 18 BRPM | BODY MASS INDEX: 28.71 KG/M2 | HEART RATE: 78 BPM

## 2019-04-24 DIAGNOSIS — M79.605 LEFT LEG PAIN: ICD-10-CM

## 2019-04-24 DIAGNOSIS — E78.2 MIXED HYPERLIPIDEMIA: Primary | ICD-10-CM

## 2019-04-24 DIAGNOSIS — E55.9 VITAMIN D DEFICIENCY: ICD-10-CM

## 2019-04-24 DIAGNOSIS — R73.02 GLUCOSE INTOLERANCE (IMPAIRED GLUCOSE TOLERANCE): ICD-10-CM

## 2019-04-24 DIAGNOSIS — E66.09 EXOGENOUS OBESITY: ICD-10-CM

## 2019-04-24 DIAGNOSIS — R35.1 NOCTURIA: ICD-10-CM

## 2019-04-24 DIAGNOSIS — M54.16 LUMBAR RADICULOPATHY: ICD-10-CM

## 2019-04-24 PROCEDURE — 99214 OFFICE O/P EST MOD 30 MIN: CPT | Performed by: FAMILY MEDICINE

## 2019-04-24 PROCEDURE — 72100 X-RAY EXAM L-S SPINE 2/3 VWS: CPT

## 2019-04-24 RX ORDER — ATORVASTATIN CALCIUM 80 MG/1
80 TABLET, FILM COATED ORAL DAILY
Qty: 90 TABLET | Refills: 1 | Status: SHIPPED | OUTPATIENT
Start: 2019-04-24 | End: 2019-10-26 | Stop reason: SDUPTHER

## 2019-04-24 NOTE — PATIENT INSTRUCTIONS
No visits with results within 2 Week(s) from this visit.   Latest known visit with results is:   Orders Only on 04/02/2019   Component Date Value Ref Range Status   • WBC 04/16/2019 4.95  3.40 - 10.80 10*3/mm3 Final   • RBC 04/16/2019 4.92  4.14 - 5.80 10*6/mm3 Final   • Hemoglobin 04/16/2019 14.8  13.0 - 17.7 g/dL Final   • Hematocrit 04/16/2019 45.5  37.5 - 51.0 % Final   • MCV 04/16/2019 92.5  79.0 - 97.0 fL Final   • MCH 04/16/2019 30.1  26.6 - 33.0 pg Final   • MCHC 04/16/2019 32.5  31.5 - 35.7 g/dL Final   • RDW 04/16/2019 13.2  12.3 - 15.4 % Final   • Platelets 04/16/2019 275  140 - 450 10*3/mm3 Final   • Neutrophil Rel % 04/16/2019 54.6  42.7 - 76.0 % Final   • Lymphocyte Rel % 04/16/2019 29.5  19.6 - 45.3 % Final   • Monocyte Rel % 04/16/2019 11.3  5.0 - 12.0 % Final   • Eosinophil Rel % 04/16/2019 3.4  0.3 - 6.2 % Final   • Basophil Rel % 04/16/2019 1.0  0.0 - 1.5 % Final   • Neutrophils Absolute 04/16/2019 2.70  1.40 - 7.00 10*3/mm3 Final   • Lymphocytes Absolute 04/16/2019 1.46  0.70 - 3.10 10*3/mm3 Final   • Monocytes Absolute 04/16/2019 0.56  0.10 - 0.90 10*3/mm3 Final   • Eosinophils Absolute 04/16/2019 0.17  0.00 - 0.40 10*3/mm3 Final   • Basophils Absolute 04/16/2019 0.05  0.00 - 0.20 10*3/mm3 Final   • Immature Granulocyte Rel % 04/16/2019 0.2  0.0 - 0.5 % Final   • Immature Grans Absolute 04/16/2019 0.01  0.00 - 0.05 10*3/mm3 Final   • nRBC 04/16/2019 0.0  0.0 - 0.0 /100 WBC Final   • Glucose 04/16/2019 129* 65 - 99 mg/dL Final   • BUN 04/16/2019 17  8 - 23 mg/dL Final   • Creatinine 04/16/2019 1.16  0.76 - 1.27 mg/dL Final   • eGFR Non African Am 04/16/2019 62  >60 mL/min/1.73 Final   • eGFR African Am 04/16/2019 76  >60 mL/min/1.73 Final   • BUN/Creatinine Ratio 04/16/2019 14.7  7.0 - 25.0 Final   • Sodium 04/16/2019 140  136 - 145 mmol/L Final   • Potassium 04/16/2019 4.4  3.5 - 5.2 mmol/L Final   • Chloride 04/16/2019 105  98 - 107 mmol/L Final   • Total CO2 04/16/2019 23.4  22.0 - 29.0 mmol/L  Final   • Calcium 04/16/2019 9.9  8.6 - 10.5 mg/dL Final   • Total Protein 04/16/2019 8.4  6.0 - 8.5 g/dL Final   • Albumin 04/16/2019 4.80  3.50 - 5.20 g/dL Final   • Globulin 04/16/2019 3.6  gm/dL Final   • A/G Ratio 04/16/2019 1.3  g/dL Final   • Total Bilirubin 04/16/2019 0.6  0.2 - 1.2 mg/dL Final   • Alkaline Phosphatase 04/16/2019 36* 39 - 117 U/L Final   • AST (SGOT) 04/16/2019 38  1 - 40 U/L Final   • ALT (SGPT) 04/16/2019 57* 1 - 41 U/L Final   • Hemoglobin A1C 04/16/2019 6.40* 4.80 - 5.60 % Final    Comment: Hemoglobin A1C Ranges:  Increased Risk for Diabetes  5.7% to 6.4%  Diabetes                     >= 6.5%  Diabetic Goal                < 7.0%     • Total Cholesterol 04/16/2019 160  0 - 200 mg/dL Final   • Triglycerides 04/16/2019 82  0 - 150 mg/dL Final   • HDL Cholesterol 04/16/2019 49  40 - 60 mg/dL Final   • VLDL Cholesterol 04/16/2019 16.4  mg/dL Final   • LDL Cholesterol  04/16/2019 95  0 - 100 mg/dL Final   • 25 Hydroxy, Vitamin D 04/16/2019 95.3  30.0 - 100.0 ng/ml Final    Comment: Reference Range for Total Vitamin D 25(OH)  Deficiency <20.0 ng/mL  Insufficiency 21-29 ng/mL  Sufficiency  ng/mL  Toxicity >100 ng/ml       l AP and lateral lumbar spine x-ray order has been given to patient to accomplish as per his own schedule.  If pain persist and disc issue is observed on x-rays consideration for MRI will take place.  Believe the primary issue is his lumbar spine and not necessarily direct left hip or left knee issues.

## 2019-04-24 NOTE — PROGRESS NOTES
Subjective   Krishna Le is a 68 y.o. male with   Chief Complaint   Patient presents with   • Follow-up     on labs   • Hyperlipidemia   .    History of Present Illness     69 yo white male presents for follow up on stable HLD, stable Glucose Intolerance and Vit D Def.  He has stable blood pressure today and a history of exogenous obesity.  His weight has remained stable since his last visit.  Pt states he used to walk several times per week but since January he has not been doing so.  Pt is currently prescribed Atorvastatin 80 mg daily, fenofibrate, 160 mg and he tolerates these well.  He fell on the ice several months ago and he has had left sided leg pain when standing or sitting for several weeks.  Pain primarily is in the left hip and runs anteriorly through the thigh into the left knee.  This pain tends to not go past the knee although at times the dorsal part of his left foot hurts.  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  Pt reports no other problms or complaints.    The following portions of the patient's history were reviewed and updated as appropriate: allergies, current medications, past family history, past medical history, past social history, past surgical history and problem list.    Review of Systems   Cardiovascular: Negative for chest pain, palpitations and leg swelling.        HLD   Endocrine: Negative for cold intolerance and heat intolerance.        Glucose Intolerance  Vit D Def   Musculoskeletal:        Left sided leg pain   All other systems reviewed and are negative.      Objective     Vitals:    04/24/19 0808   BP: 140/80   Pulse: 78   Resp: 18   Temp: 98.3 °F (36.8 °C)   SpO2: 99%     BP Readings from Last 3 Encounters:   04/24/19 140/80   10/23/18 124/80   04/17/18 130/80      Wt Readings from Last 3 Encounters:   04/24/19 96.2 kg (212 lb)   10/23/18 95.7 kg  (211 lb)   04/17/18 106 kg (233 lb 8 oz)        Physical Exam   Constitutional: He is oriented to person, place, and time. He appears well-developed and well-nourished.   HENT:   Head: Normocephalic and atraumatic.   Neck: Trachea normal and phonation normal. Neck supple. Normal carotid pulses present. Carotid bruit is not present. No thyroid mass and no thyromegaly present.   Cardiovascular: Normal rate, regular rhythm and normal heart sounds. Exam reveals no gallop and no friction rub.   No murmur heard.  Pulmonary/Chest: Effort normal and breath sounds normal. No respiratory distress. He has no decreased breath sounds. He has no wheezes. He has no rhonchi. He has no rales.   Musculoskeletal:   Lower extremities with good tone, motor 5/5 neurovascular intact distally.  Straight leg raising is negative at 60 degrees bilaterally.  Theo is full and nontender.  Left knee is without effusion and demonstrates full range of motion.  Ligaments are stable with no evidence of patellar tenderness or crepitance.   Lymphadenopathy:     He has no cervical adenopathy.   Neurological: He is alert and oriented to person, place, and time. He has normal strength and normal reflexes. No sensory deficit. Gait normal.   Skin: Skin is warm and dry. No rash noted.   Psychiatric: He has a normal mood and affect. His speech is normal and behavior is normal. Judgment and thought content normal. Cognition and memory are normal.   Nursing note and vitals reviewed.      Assessment/Plan   Krishna was seen today for follow-up and hyperlipidemia.    Diagnoses and all orders for this visit:    Mixed hyperlipidemia  -     atorvastatin (LIPITOR) 80 MG tablet; Take 1 tablet by mouth Daily.  -     CBC & Differential  -     Lipid Panel    Glucose intolerance (impaired glucose tolerance)  -     Hemoglobin A1c    Vitamin D deficiency  -     Vitamin D 25 Hydroxy    Exogenous obesity    Lumbar radiculopathy  -     XR Spine Lumbar AP & Lateral    Left leg  pain    Nocturia        Return in about 6 months (around 10/24/2019) for Next scheduled follow up.    Scribed for Papo Anand MD by Tootie Arteaga CMA. 04/24/2019    I, Papo Anand MD personally performed the services described in this documentation, as scribed by Tootie Arteaga CMA in my presence, and it is both accurate and complete

## 2019-04-25 ENCOUNTER — TELEPHONE (OUTPATIENT)
Dept: FAMILY MEDICINE CLINIC | Facility: CLINIC | Age: 69
End: 2019-04-25

## 2019-04-25 DIAGNOSIS — M54.16 LUMBAR RADICULOPATHY: ICD-10-CM

## 2019-04-25 DIAGNOSIS — M51.36 DDD (DEGENERATIVE DISC DISEASE), LUMBAR: Primary | ICD-10-CM

## 2019-04-25 NOTE — TELEPHONE ENCOUNTER
Krishna said that he wants to be referred out to Dr. Hernandez office instead of Dr. Retana's for DDD

## 2019-04-29 ENCOUNTER — TELEPHONE (OUTPATIENT)
Dept: GASTROENTEROLOGY | Facility: CLINIC | Age: 69
End: 2019-04-29

## 2019-05-02 ENCOUNTER — PREP FOR SURGERY (OUTPATIENT)
Dept: OTHER | Facility: HOSPITAL | Age: 69
End: 2019-05-02

## 2019-05-02 DIAGNOSIS — D12.6 ADENOMATOUS POLYP OF COLON, UNSPECIFIED PART OF COLON: Primary | ICD-10-CM

## 2019-05-06 PROBLEM — D12.6 ADENOMATOUS POLYP OF COLON: Status: ACTIVE | Noted: 2019-05-06

## 2019-05-28 ENCOUNTER — OFFICE VISIT (OUTPATIENT)
Dept: ORTHOPEDIC SURGERY | Facility: CLINIC | Age: 69
End: 2019-05-28

## 2019-05-28 VITALS — BODY MASS INDEX: 32.23 KG/M2 | TEMPERATURE: 98 F | WEIGHT: 238 LBS | HEIGHT: 72 IN

## 2019-05-28 DIAGNOSIS — M54.30 SCIATICA, UNSPECIFIED LATERALITY: Primary | ICD-10-CM

## 2019-05-28 PROCEDURE — 99203 OFFICE O/P NEW LOW 30 MIN: CPT | Performed by: ORTHOPAEDIC SURGERY

## 2019-05-28 NOTE — PROGRESS NOTES
New patient or new problem visit    Chief Complaint   Patient presents with   • Lumbar Spine - Pain       HPI: He complains of history since January following a slip and fall on ice of back pain radiating to both legs worse when sitting.  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    PFSH: See chart- reviewed    Review of Systems   Constitutional: Positive for diaphoresis (night sweats). Negative for chills, fever and unexpected weight change.   HENT: Positive for postnasal drip. Negative for trouble swallowing and voice change.    Eyes: Negative for visual disturbance.        Vision changes, dye eyes     Respiratory: Negative for cough and shortness of breath.    Cardiovascular: Negative for chest pain and leg swelling.   Gastrointestinal: Negative for abdominal pain, nausea and vomiting.   Endocrine: Negative for cold intolerance and heat intolerance.   Genitourinary: Negative for difficulty urinating, frequency and urgency.   Musculoskeletal: Positive for joint swelling and myalgias.   Skin: Negative for rash and wound.   Allergic/Immunologic: Negative for immunocompromised state.   Neurological: Negative for weakness and numbness.   Hematological: Does not bruise/bleed easily.   Psychiatric/Behavioral: Negative for dysphoric mood. The patient is not nervous/anxious.        PE: Constitutional: Vital signs above-noted.  Awake, alert and oriented    Psychiatric: Affect and insight do not appear grossly disturbed.    Pulmonary: Breathing is unlabored, color is good.    Skin: Warm, dry and normal turgor    Cardiac: Pedal pulses intact.  No edema.    Eyesight and hearing appear adequate for examination purposes      Musculoskeletal:  There is no tenderness to percussion and palpation of the spine. Motion appears undisturbed.  Posture is unremarkable to coronal and sagittal  inspection.    The skin about the area is intact.  There is no palpable or visible deformity.  There is no local spasm.       Neurologic:   Reflexes are 2+ and symmetrical in the patellae and achilles.   Motor function is undisturbed in quadriceps, EHL, and gastrocnemius   sensation appears symmetrically intact to light touch.  In the bilateral lower extremities there is no evidence of atrophy.   Clonus is absent..  Gait appears undisturbed.  SLR test negative      MEDICAL DECISION MAKING    XRAY: Plain film x-rays of the lumbar spine demonstrate disc degeneration and a hint of anterolisthesis at L5-S1.  The radiologist does not mention this.    Other: n/a    Impression: Lumbar spondylolisthesis and probable element of lumbar spinal stenosis, neurologically stable    Plan: For now let us try physical therapy and if he fails to improve the next step will be lumbar MRI for further evaluation

## 2019-06-03 RX ORDER — FENOFIBRATE 160 MG/1
TABLET ORAL
Qty: 90 TABLET | Refills: 1 | Status: SHIPPED | OUTPATIENT
Start: 2019-06-03 | End: 2019-10-26 | Stop reason: SDUPTHER

## 2019-08-15 ENCOUNTER — ANESTHESIA EVENT (OUTPATIENT)
Dept: PERIOP | Facility: HOSPITAL | Age: 69
End: 2019-08-15

## 2019-08-16 ENCOUNTER — ANESTHESIA (OUTPATIENT)
Dept: PERIOP | Facility: HOSPITAL | Age: 69
End: 2019-08-16

## 2019-08-16 ENCOUNTER — HOSPITAL ENCOUNTER (OUTPATIENT)
Facility: HOSPITAL | Age: 69
Setting detail: HOSPITAL OUTPATIENT SURGERY
Discharge: HOME | End: 2019-08-16
Attending: INTERNAL MEDICINE | Admitting: INTERNAL MEDICINE

## 2019-08-16 VITALS
HEIGHT: 72 IN | DIASTOLIC BLOOD PRESSURE: 82 MMHG | OXYGEN SATURATION: 96 % | SYSTOLIC BLOOD PRESSURE: 154 MMHG | RESPIRATION RATE: 12 BRPM | WEIGHT: 234.8 LBS | TEMPERATURE: 97.7 F | HEART RATE: 62 BPM | BODY MASS INDEX: 31.8 KG/M2

## 2019-08-16 DIAGNOSIS — D12.6 ADENOMATOUS POLYP OF COLON, UNSPECIFIED PART OF COLON: ICD-10-CM

## 2019-08-16 PROCEDURE — 25010000002 PROPOFOL 10 MG/ML EMULSION: Performed by: NURSE ANESTHETIST, CERTIFIED REGISTERED

## 2019-08-16 PROCEDURE — 45380 COLONOSCOPY AND BIOPSY: CPT | Performed by: INTERNAL MEDICINE

## 2019-08-16 PROCEDURE — 88305 TISSUE EXAM BY PATHOLOGIST: CPT | Performed by: INTERNAL MEDICINE

## 2019-08-16 RX ORDER — LIDOCAINE HYDROCHLORIDE 10 MG/ML
0.5 INJECTION, SOLUTION EPIDURAL; INFILTRATION; INTRACAUDAL; PERINEURAL ONCE AS NEEDED
Status: COMPLETED | OUTPATIENT
Start: 2019-08-16 | End: 2019-08-16

## 2019-08-16 RX ORDER — ONDANSETRON 2 MG/ML
4 INJECTION INTRAMUSCULAR; INTRAVENOUS ONCE AS NEEDED
Status: CANCELLED | OUTPATIENT
Start: 2019-08-16

## 2019-08-16 RX ORDER — MEPERIDINE HYDROCHLORIDE 25 MG/ML
12.5 INJECTION INTRAMUSCULAR; INTRAVENOUS; SUBCUTANEOUS
Status: CANCELLED | OUTPATIENT
Start: 2019-08-16 | End: 2019-08-17

## 2019-08-16 RX ORDER — PROPOFOL 10 MG/ML
VIAL (ML) INTRAVENOUS AS NEEDED
Status: DISCONTINUED | OUTPATIENT
Start: 2019-08-16 | End: 2019-08-16 | Stop reason: SURG

## 2019-08-16 RX ORDER — SODIUM CHLORIDE, SODIUM LACTATE, POTASSIUM CHLORIDE, CALCIUM CHLORIDE 600; 310; 30; 20 MG/100ML; MG/100ML; MG/100ML; MG/100ML
9 INJECTION, SOLUTION INTRAVENOUS CONTINUOUS
Status: DISCONTINUED | OUTPATIENT
Start: 2019-08-16 | End: 2019-08-16 | Stop reason: HOSPADM

## 2019-08-16 RX ORDER — SODIUM CHLORIDE 0.9 % (FLUSH) 0.9 %
1-10 SYRINGE (ML) INJECTION AS NEEDED
Status: DISCONTINUED | OUTPATIENT
Start: 2019-08-16 | End: 2019-08-16 | Stop reason: HOSPADM

## 2019-08-16 RX ORDER — LIDOCAINE HYDROCHLORIDE 20 MG/ML
INJECTION, SOLUTION INFILTRATION; PERINEURAL AS NEEDED
Status: DISCONTINUED | OUTPATIENT
Start: 2019-08-16 | End: 2019-08-16 | Stop reason: SURG

## 2019-08-16 RX ORDER — MAGNESIUM HYDROXIDE 1200 MG/15ML
LIQUID ORAL AS NEEDED
Status: DISCONTINUED | OUTPATIENT
Start: 2019-08-16 | End: 2019-08-16 | Stop reason: HOSPADM

## 2019-08-16 RX ORDER — SODIUM CHLORIDE, SODIUM LACTATE, POTASSIUM CHLORIDE, CALCIUM CHLORIDE 600; 310; 30; 20 MG/100ML; MG/100ML; MG/100ML; MG/100ML
100 INJECTION, SOLUTION INTRAVENOUS CONTINUOUS
Status: CANCELLED | OUTPATIENT
Start: 2019-08-16

## 2019-08-16 RX ORDER — SODIUM CHLORIDE 9 MG/ML
40 INJECTION, SOLUTION INTRAVENOUS AS NEEDED
Status: DISCONTINUED | OUTPATIENT
Start: 2019-08-16 | End: 2019-08-16 | Stop reason: HOSPADM

## 2019-08-16 RX ADMIN — SODIUM CHLORIDE, POTASSIUM CHLORIDE, SODIUM LACTATE AND CALCIUM CHLORIDE: 600; 310; 30; 20 INJECTION, SOLUTION INTRAVENOUS at 12:45

## 2019-08-16 RX ADMIN — PROPOFOL 30 MG: 10 INJECTION, EMULSION INTRAVENOUS at 13:00

## 2019-08-16 RX ADMIN — PROPOFOL 50 MG: 10 INJECTION, EMULSION INTRAVENOUS at 13:01

## 2019-08-16 RX ADMIN — PROPOFOL 30 MG: 10 INJECTION, EMULSION INTRAVENOUS at 13:08

## 2019-08-16 RX ADMIN — PROPOFOL 30 MG: 10 INJECTION, EMULSION INTRAVENOUS at 13:12

## 2019-08-16 RX ADMIN — PROPOFOL 50 MG: 10 INJECTION, EMULSION INTRAVENOUS at 12:58

## 2019-08-16 RX ADMIN — PROPOFOL 30 MG: 10 INJECTION, EMULSION INTRAVENOUS at 13:10

## 2019-08-16 RX ADMIN — LIDOCAINE HYDROCHLORIDE 0.5 ML: 10 INJECTION, SOLUTION EPIDURAL; INFILTRATION; INTRACAUDAL; PERINEURAL at 11:27

## 2019-08-16 RX ADMIN — PROPOFOL 30 MG: 10 INJECTION, EMULSION INTRAVENOUS at 13:06

## 2019-08-16 RX ADMIN — PROPOFOL 50 MG: 10 INJECTION, EMULSION INTRAVENOUS at 12:59

## 2019-08-16 RX ADMIN — PROPOFOL 30 MG: 10 INJECTION, EMULSION INTRAVENOUS at 13:14

## 2019-08-16 RX ADMIN — PROPOFOL 30 MG: 10 INJECTION, EMULSION INTRAVENOUS at 13:03

## 2019-08-16 RX ADMIN — LIDOCAINE HYDROCHLORIDE 100 MG: 20 INJECTION, SOLUTION INFILTRATION; PERINEURAL at 12:58

## 2019-08-16 RX ADMIN — SODIUM CHLORIDE, POTASSIUM CHLORIDE, SODIUM LACTATE AND CALCIUM CHLORIDE 9 ML/HR: 600; 310; 30; 20 INJECTION, SOLUTION INTRAVENOUS at 11:27

## 2019-08-16 NOTE — H&P
Patient Care Team:  Papo Anand DO as PCP - General (Family Medicine)  Katya Toussaint MD as PCP - Claims Attributed    CHIEF COMPLAINT: Previous Polyps    HISTORY OF PRESENT ILLNESS:    Last colon in 2016      Past Medical History:   Diagnosis Date   • Arthritis    • Basal cell carcinoma    • Diabetes (CMS/HCC)     Type 2, boarderline. well controlled   • Elevated cholesterol      Past Surgical History:   Procedure Laterality Date   • COLONOSCOPY     • COLONOSCOPY N/A 6/29/2016    Procedure: COLONOSCOPY; POLYPECTOMY;  Surgeon: Colin Rojas MD;  Location: [facility];  Service:      Family History   Problem Relation Age of Onset   • Cancer Mother         pancreatic   • Cancer Father         polysythemia/skin cancer     Social History     Tobacco Use   • Smoking status: Never Smoker   • Smokeless tobacco: Never Used   Substance Use Topics   • Alcohol use: Yes     Alcohol/week: 1.2 oz     Types: 2 Glasses of wine per week     Comment: I PER DAY   • Drug use: No     Medications Prior to Admission   Medication Sig Dispense Refill Last Dose   • aspirin 81 MG chewable tablet Chew.   8/10/2019   • atorvastatin (LIPITOR) 80 MG tablet Take 1 tablet by mouth Daily. 90 tablet 1 8/14/2019   • cholecalciferol (VITAMIN D3) 1000 UNITS tablet Take 10,000 Units by mouth Daily.   8/14/2019   • Dupilumab, Asthma, (DUPIXENT SC) Inject  under the skin into the appropriate area as directed.   8/8/2019   • fenofibrate 160 MG tablet TAKE ONE TABLET BY MOUTH DAILY 90 tablet 1 8/14/2019   • Nutritional Supplements (GLUCOSAMINE FORTE) capsule Take  by mouth.   8/14/2019   • tacrolimus (PROTOPIC) 0.1 % ointment    8/14/2019   • UNKNOWN TO PATIENT OTC allergy nasal spray   8/14/2019   • vitamin C (ASCORBIC ACID) 500 MG tablet Take 500 mg by mouth Daily.   8/14/2019   • betamethasone dipropionate (DIPROLENE) 0.05 % ointment Apply  topically 2 (Two) Times a Day.   More than a month at Unknown time   • Cetirizine HCl (ALLERGY  "RELIEF) 10 MG capsule Take  by mouth.   More than a month at Unknown time     Allergies:  Shrimp    REVIEW OF SYSTEMS:  Please see the above history of present illness for pertinent positives and negatives.  The remainder of the patient's systems have been reviewed and are negative.     Vital Signs  Temp:  [97.7 °F (36.5 °C)] 97.7 °F (36.5 °C)  Heart Rate:  [75] 75  Resp:  [14] 14  BP: (161)/(99) 161/99    Flowsheet Rows      First Filed Value   Admission Height  182.9 cm (72\") Documented at 08/16/2019 1059   Admission Weight  107 kg (234 lb 12.8 oz) Documented at 08/16/2019 1059           Physical Exam:  Physical Exam   Constitutional: Patient appears well-developed and well-nourished and in no acute distress   HEENT:   Head: Normocephalic and atraumatic.   Eyes:  Pupils are equal, round, and reactive to light. EOM are intact. Sclerae are anicteric and non-injected.  Mouth and Throat: Patient has moist mucous membranes. Oropharynx is clear of any erythema or exudate.     Neck: Neck supple. No JVD present. No thyromegaly present. No lymphadenopathy present.  Cardiovascular: Regular rate, regular rhythm, S1 normal and S2 normal.  Exam reveals no gallop and no friction rub.  No murmur heard.  Pulmonary/Chest: Lungs are clear to auscultation bilaterally. No respiratory distress. No wheezes. No rhonchi. No rales.   Abdominal: Soft. Bowel sounds are normal. No distension and no mass. There is no hepatosplenomegaly. There is no tenderness.   Musculoskeletal: Normal Muscle tone  Extremities: No edema. Pulses are palpable in all 4 extremities.  Neurological: Patient is alert and oriented to person, place, and time. Cranial nerves II-XII are grossly intact with no focal deficits.  Skin: Skin is warm. No rash noted. Nails show no clubbing.  No cyanosis or erythema.    Debilities/Disabilities Identified: None  Emotional Behavior: Appropriate     Results Review:    I reviewed the patient's new clinical results.  Lab Results " (most recent)     None          Imaging Results (most recent)     None        reviewed    ECG/EMG Results (most recent)     None        reviewed    Assessment/Plan     Personal History of Colon Polyps / Colnoscopy      I discussed the patients findings and my recommendations with patient.     Colin Rojas MD  08/16/19  12:51 PM    Time: 10 min prior to procedure.

## 2019-08-16 NOTE — OP NOTE
COLONOSCOPY  Procedure Report    Patient Name:  Krishna Le  YOB: 1950    Date of Surgery:  8/16/2019     Indications:  Previous Polyps    Pre-op Diagnosis:   Adenomatous polyp of colon, unspecified part of colon [D12.6]    Post-Op Diagnosis Codes:     * Adenomatous polyp of colon, unspecified part of colon [D12.6]     * Colon polyp [K63.5]         Procedure/CPT® Codes:      Procedure(s):  COLONOSCOPY, polypectomy    Staff:  Surgeon(s):  Colin Rojas MD         Anesthesia: Monitored Anesthesia Care    Estimated Blood Loss: none    Specimens:   ID Type Source Tests Collected by Time   A : Rectal polyp x 1 Polyp Large Intestine, Rectum TISSUE PATHOLOGY EXAM oClin Rojas MD 8/16/2019 1317       Implants:    Nothing was implanted during the procedure      Description of Procedure: After having signed informed consent, he was brought to the endoscopy suite and placed in left lateral decubitus position, given his IV sedation. Rectal exam revealed no external lesions, normal anal tone, no rectal mass. Scope was introduced into rectum and advanced under direct visualization with ease through the sigmoid colon, descending colon, to and around the splenic flexure, reduced and advanced through the transverse colon with some looping, to and around the hepatic flexure. Scope was reduced using abdominal splinting and the variable resistance on the scope. Scope was advanced easily then into the cecum. Cecum was identified by appendiceal orifice and ileocecal valve. It was well prepped and normal appearing. The ileocecal valve was briefly intubated. Terminal ileum was normal appearing. Scope was drawn back into the ascending colon, withdrawn slowly. I examined the colon in circumferential fashion. There were no mucosal lesions noted through the cecum, ascending colon, hepatic flexure, transverse, descending, or sigmoid colon. In the proximal rectum was a single  hyperplastic-appearing polyp that was biopsied, felt to be completely removed, send separately as a rectal polyp. Scope was withdrawn to the distal rectum revealing intact dentate line and no further mucosal lesions. Scope was de-retroflexed and withdrawn. Patient tolerated procedure very well.          Findings: Colon to TI good Prep  Single small polyp      Complications: none    Recommendations: Rresults to be called  Repeat in 5 years      Colin Rojas MD     Date: 8/16/2019  Time: 1:28 PM

## 2019-08-16 NOTE — ANESTHESIA PREPROCEDURE EVALUATION
Anesthesia Evaluation     Patient summary reviewed and Nursing notes reviewed   no history of anesthetic complications:  NPO Solid Status: > 8 hours  NPO Liquid Status: > 8 hours           Airway   Mallampati: II  TM distance: >3 FB  Neck ROM: full  no difficulty expected  Dental - normal exam         Pulmonary - negative pulmonary ROS and normal exam    breath sounds clear to auscultation  Cardiovascular - normal exam    Rhythm: regular  Rate: normal    (+) hyperlipidemia,       Neuro/Psych- negative ROS  GI/Hepatic/Renal/Endo    (+) obesity, morbid obesity,  diabetes mellitus (diet controlled) well controlled,     Musculoskeletal     (+) back pain,   Abdominal  - normal exam   Substance History   (+) alcohol use,      OB/GYN          Other   (+) arthritis (hands and shoulders)   history of cancer ( basal cells)    ROS/Med Hx Other: Sip of water with meds and miralax at 0700.    No baby asa in 2 days      Phys Exam Other: bridge                Anesthesia Plan    ASA 2     MAC     intravenous induction   Anesthetic plan, all risks, benefits, and alternatives have been provided, discussed and informed consent has been obtained with: patient.  Use of blood products discussed with patient  Consented to blood products.   Plan discussed with CRNA.

## 2019-08-16 NOTE — ANESTHESIA POSTPROCEDURE EVALUATION
Patient: Krishna Le    Procedure Summary     Date:  08/16/19 Room / Location:  [facility] ENDOSCOPY 1 /  [facility]    Anesthesia Start:  1253 Anesthesia Stop:  1323    Procedure:  COLONOSCOPY, polypectomy (N/A ) Diagnosis:       Adenomatous polyp of colon, unspecified part of colon      Colon polyp      (Adenomatous polyp of colon, unspecified part of colon [D12.6])    Surgeon:  Colin Rojas MD Provider:  Lavelle Levy CRNA    Anesthesia Type:  MAC ASA Status:  2          Anesthesia Type: MAC  Last vitals  BP   140/86 (08/16/19 1350)   Temp   97.7 °F (36.5 °C) (08/16/19 1059)   Pulse   66 (08/16/19 1350)   Resp   12 (08/16/19 1350)     SpO2   96 % (08/16/19 1350)     Post Anesthesia Care and Evaluation    Patient location during evaluation: bedside  Patient participation: complete - patient participated  Level of consciousness: awake  Pain score: 0  Pain management: adequate  Airway patency: patent  Anesthetic complications: No anesthetic complications  PONV Status: none  Cardiovascular status: acceptable  Respiratory status: acceptable  Hydration status: acceptable

## 2019-08-20 LAB
CYTO UR: NORMAL
LAB AP CASE REPORT: NORMAL
PATH REPORT.FINAL DX SPEC: NORMAL
PATH REPORT.GROSS SPEC: NORMAL

## 2019-10-16 DIAGNOSIS — R73.02 GLUCOSE INTOLERANCE (IMPAIRED GLUCOSE TOLERANCE): Primary | ICD-10-CM

## 2019-10-16 DIAGNOSIS — Z12.5 SPECIAL SCREENING FOR MALIGNANT NEOPLASM OF PROSTATE: ICD-10-CM

## 2019-10-18 LAB
25(OH)D3+25(OH)D2 SERPL-MCNC: 76.8 NG/ML (ref 30–100)
ALBUMIN SERPL-MCNC: 4.1 G/DL (ref 3.5–4.8)
ALBUMIN/GLOB SERPL: 1.2 {RATIO} (ref 1.2–2.2)
ALP SERPL-CCNC: 39 IU/L (ref 39–117)
ALT SERPL-CCNC: 39 IU/L (ref 0–44)
AST SERPL-CCNC: 26 IU/L (ref 0–40)
BASOPHILS # BLD AUTO: 0 X10E3/UL (ref 0–0.2)
BASOPHILS NFR BLD AUTO: 1 %
BILIRUB SERPL-MCNC: 0.4 MG/DL (ref 0–1.2)
BUN SERPL-MCNC: 20 MG/DL (ref 8–27)
BUN/CREAT SERPL: 18 (ref 10–24)
CALCIUM SERPL-MCNC: 9.3 MG/DL (ref 8.6–10.2)
CHLORIDE SERPL-SCNC: 104 MMOL/L (ref 96–106)
CHOLEST SERPL-MCNC: 156 MG/DL (ref 100–199)
CO2 SERPL-SCNC: 24 MMOL/L (ref 20–29)
CREAT SERPL-MCNC: 1.09 MG/DL (ref 0.76–1.27)
EOSINOPHIL # BLD AUTO: 0.2 X10E3/UL (ref 0–0.4)
EOSINOPHIL NFR BLD AUTO: 3 %
ERYTHROCYTE [DISTWIDTH] IN BLOOD BY AUTOMATED COUNT: 13.9 % (ref 12.3–15.4)
GLOBULIN SER CALC-MCNC: 3.4 G/DL (ref 1.5–4.5)
GLUCOSE SERPL-MCNC: 129 MG/DL (ref 65–99)
HBA1C MFR BLD: 7 % (ref 4.8–5.6)
HCT VFR BLD AUTO: 46 % (ref 37.5–51)
HDLC SERPL-MCNC: 46 MG/DL
HGB BLD-MCNC: 15.1 G/DL (ref 13–17.7)
IMM GRANULOCYTES # BLD AUTO: 0 X10E3/UL (ref 0–0.1)
IMM GRANULOCYTES NFR BLD AUTO: 0 %
LDLC SERPL CALC-MCNC: 96 MG/DL (ref 0–99)
LYMPHOCYTES # BLD AUTO: 1.8 X10E3/UL (ref 0.7–3.1)
LYMPHOCYTES NFR BLD AUTO: 30 %
MCH RBC QN AUTO: 30.3 PG (ref 26.6–33)
MCHC RBC AUTO-ENTMCNC: 32.8 G/DL (ref 31.5–35.7)
MCV RBC AUTO: 92 FL (ref 79–97)
MONOCYTES # BLD AUTO: 0.5 X10E3/UL (ref 0.1–0.9)
MONOCYTES NFR BLD AUTO: 8 %
NEUTROPHILS # BLD AUTO: 3.5 X10E3/UL (ref 1.4–7)
NEUTROPHILS NFR BLD AUTO: 58 %
PLATELET # BLD AUTO: 291 X10E3/UL (ref 150–450)
POTASSIUM SERPL-SCNC: 4.5 MMOL/L (ref 3.5–5.2)
PROT SERPL-MCNC: 7.5 G/DL (ref 6–8.5)
PSA SERPL-MCNC: 2.9 NG/ML (ref 0–4)
RBC # BLD AUTO: 4.99 X10E6/UL (ref 4.14–5.8)
SODIUM SERPL-SCNC: 143 MMOL/L (ref 134–144)
TRIGL SERPL-MCNC: 71 MG/DL (ref 0–149)
VLDLC SERPL CALC-MCNC: 14 MG/DL (ref 5–40)
WBC # BLD AUTO: 5.9 X10E3/UL (ref 3.4–10.8)

## 2019-10-24 ENCOUNTER — OFFICE VISIT (OUTPATIENT)
Dept: FAMILY MEDICINE CLINIC | Facility: CLINIC | Age: 69
End: 2019-10-24

## 2019-10-24 VITALS
DIASTOLIC BLOOD PRESSURE: 80 MMHG | OXYGEN SATURATION: 93 % | WEIGHT: 236 LBS | HEIGHT: 72 IN | SYSTOLIC BLOOD PRESSURE: 132 MMHG | BODY MASS INDEX: 31.97 KG/M2 | HEART RATE: 81 BPM

## 2019-10-24 DIAGNOSIS — E11.9 TYPE 2 DIABETES MELLITUS WITHOUT COMPLICATION, WITHOUT LONG-TERM CURRENT USE OF INSULIN (HCC): ICD-10-CM

## 2019-10-24 DIAGNOSIS — E55.9 VITAMIN D DEFICIENCY: ICD-10-CM

## 2019-10-24 DIAGNOSIS — N52.9 ERECTILE DYSFUNCTION, UNSPECIFIED ERECTILE DYSFUNCTION TYPE: ICD-10-CM

## 2019-10-24 DIAGNOSIS — Z00.00 MEDICARE ANNUAL WELLNESS VISIT, SUBSEQUENT: Primary | ICD-10-CM

## 2019-10-24 DIAGNOSIS — E78.2 MIXED HYPERLIPIDEMIA: ICD-10-CM

## 2019-10-24 DIAGNOSIS — E66.09 EXOGENOUS OBESITY: ICD-10-CM

## 2019-10-24 PROCEDURE — 99213 OFFICE O/P EST LOW 20 MIN: CPT | Performed by: FAMILY MEDICINE

## 2019-10-24 PROCEDURE — G0439 PPPS, SUBSEQ VISIT: HCPCS | Performed by: FAMILY MEDICINE

## 2019-10-24 NOTE — PROGRESS NOTES
QUICK REFERENCE INFORMATION:  The ABCs of Providing the Annual Wellness Visit   CMS.gov Learning [employer] Medicare Subsequent Wellness Visit      Subjective   History of Present Illness    Krishna Le is a 69 y.o. male who presents for an Subsequent Wellness Visit. In addition, we addressed the following health issues: 69-year-old white male with known history of hyperlipidemia, vitamin D deficiency as well as exogenous obesity and glucose intolerance here for further medical management.  Patient also suffers from erectile dysfunction.  Medications include 81 mg aspirin daily as well as atorvastatin at 80 mg daily.  He is also using fenofibrate at 160 mg daily and a host of over-the-counter supplements and vitamins.  Fasting labs have been acquired prior to this appointment.  He states that he is up-to-date with colonoscopy and is not due for 2 to 3 years.  He is doing well and has no acute complaints      PMH, PSH, SocHx, FamHx, Allergies, and Medications: Reviewed and updated in the Visit Navigator.     Outpatient Medications Prior to Visit   Medication Sig Dispense Refill   • aspirin 81 MG chewable tablet Chew.     • atorvastatin (LIPITOR) 80 MG tablet Take 1 tablet by mouth Daily. 90 tablet 1   • betamethasone dipropionate (DIPROLENE) 0.05 % ointment Apply  topically 2 (Two) Times a Day.     • Cetirizine HCl (ALLERGY RELIEF) 10 MG capsule Take  by mouth.     • cholecalciferol (VITAMIN D3) 1000 UNITS tablet Take 10,000 Units by mouth Daily.     • Dupilumab, Asthma, (DUPIXENT SC) Inject  under the skin into the appropriate area as directed.     • fenofibrate 160 MG tablet TAKE ONE TABLET BY MOUTH DAILY 90 tablet 1   • Nutritional Supplements (GLUCOSAMINE FORTE) capsule Take  by mouth.     • tacrolimus (PROTOPIC) 0.1 % ointment      • UNKNOWN TO PATIENT OTC allergy nasal spray     • vitamin C (ASCORBIC ACID) 500 MG tablet Take 500 mg by mouth Daily.       No facility-administered medications prior to visit.         Patient Active Problem List   Diagnosis   • Mixed hyperlipidemia   • Erectile dysfunction   • Vitamin D deficiency   • Exogenous obesity   • Adenomatous polyp of colon   • Type 2 diabetes mellitus without complication, without long-term current use of insulin (CMS/[facility])       Health Habits:  Dental Exam. up to date  Eye Exam. up to date  Exercise: 0 times/week.  Current exercise activities include: walking    Social:  See review in SnapShot activity and in SocHx section of Visit Navigator.    Health Risk Assessment:  The patient has completed a Health Risk Assessment. This has been reviewed with them and has been scanned into OnBase as a separate document.    Current Medical Providers:  Patient Care Team:  Papo Anand DO as PCP - General (Family Medicine)  Katya Toussaint MD as PCP - Claims Attributed    The [facility] providers who are involved in the care of this patient are listed above. Additional providers and suppliers are listed below:  none    Recent Hospitalizations:  No recent hospitalization(s)..    Age-appropriate Screening Schedule:  Refer to the list below for future screening recommendations based on patient's age. Orders for these recommended tests are listed in the plan section. The patient has been provided with a written plan.    Health Maintenance   Topic Date Due   • URINE MICROALBUMIN  11/13/2019 (Originally 10/23/2019)   • DIABETIC FOOT EXAM  01/31/2020 (Originally 10/23/2019)   • DIABETIC EYE EXAM  12/04/2019   • HEMOGLOBIN A1C  04/17/2020   • LIPID PANEL  10/17/2020   • MEDICARE ANNUAL WELLNESS  10/24/2020   • COLONOSCOPY  08/16/2024   • TDAP/TD VACCINES (2 - Td) 04/26/2027   • HEPATITIS C SCREENING  Completed   • INFLUENZA VACCINE  Completed   • PNEUMOCOCCAL VACCINES (65+ LOW/MEDIUM RISK)  Completed   • ZOSTER VACCINE  Discontinued       Depression Screen:   PHQ-2/PHQ-9 Depression Screening 10/24/2019   Little interest or pleasure in doing things 0   Feeling down,  depressed, or hopeless 0   Trouble falling or staying asleep, or sleeping too much -   Feeling tired or having little energy -   Poor appetite or overeating -   Feeling bad about yourself - or that you are a failure or have let yourself or your family down -   Trouble concentrating on things, such as reading the newspaper or watching television -   Moving or speaking so slowly that other people could have noticed. Or the opposite - being so fidgety or restless that you have been moving around a lot more than usual -   Thoughts that you would be better off dead, or of hurting yourself in some way -   Total Score 0       Functional and Cognitive Screening:  Functional & Cognitive Status 10/24/2019   Do you have difficulty preparing food and eating? No   Do you have difficulty bathing yourself, getting dressed or grooming yourself? No   Do you have difficulty using the toilet? No   Do you have difficulty moving around from place to place? No   Do you have trouble with steps or getting out of a bed or a chair? No   Current Diet Well Balanced Diet   Dental Exam Up to date   Eye Exam Up to date   Exercise (times per week) 2 times per week   Current Exercise Activities Include Yard Work   Do you need help using the phone?  No   Are you deaf or do you have serious difficulty hearing?  Yes   Do you need help with transportation? No   Do you need help shopping? No   Do you need help preparing meals?  No   Do you need help with housework?  No   Do you need help with laundry? No   Do you need help taking your medications? No   Do you need help managing money? No   Do you ever drive or ride in a car without wearing a seat belt? No   Have you felt unusual stress, anger or loneliness in the last month? No   Who do you live with? Spouse   If you need help, do you have trouble finding someone available to you? No   Do you have difficulty concentrating, remembering or making decisions? No       Does the patient have evidence of  "cognitive impairment? No    Identification of Risk Factors:  Risk factors include: Advance Directive Discussion  Cardiovascular risk  Colon Cancer Screening  Diabetic Lab Screening   Immunizations Discussed/Encouraged (specific immunizations; adacel Tdap and Shingrix )  Obesity/Overweight   Prostate Cancer Screening .    Review of Systems   Constitutional:        Obesity   Cardiovascular:        Hyperlipidemia   All other systems reviewed and are negative.      Pertinent items are noted in HPI.    Objective     Vitals:    10/24/19 0916   BP: 132/80   Pulse: 81   SpO2: 93%   Weight: 107 kg (236 lb)   Height: 182.9 cm (72\")   Physical Exam   Constitutional: He is oriented to person, place, and time. He appears well-developed and well-nourished.   Exogenous obesity with a BMI of 32   HENT:   Head: Normocephalic and atraumatic.   Neck: Trachea normal and phonation normal. Neck supple. Normal carotid pulses present. Carotid bruit is not present. No thyroid mass and no thyromegaly present.   Cardiovascular: Normal rate, regular rhythm and normal heart sounds. Exam reveals no gallop and no friction rub.   No murmur heard.  Pulmonary/Chest: Effort normal and breath sounds normal. No respiratory distress. He has no decreased breath sounds. He has no wheezes. He has no rhonchi. He has no rales.   Lymphadenopathy:     He has no cervical adenopathy.   Neurological: He is alert and oriented to person, place, and time.   Skin: Skin is warm and dry. No rash noted.   Psychiatric: He has a normal mood and affect. His speech is normal and behavior is normal. Judgment and thought content normal. Cognition and memory are normal.   Nursing note and vitals reviewed.    No visits with results within 4 Week(s) from this visit.   Latest known visit with results is:   Admission on 08/16/2019, Discharged on 08/16/2019   Component Date Value Ref Range Status   • Case Report 08/16/2019    Final                    Value:Surgical Pathology Report "                         Case: [id number]                                  Authorizing Provider:  Bob Colin        Collected:           08/16/2019 01:17 PM                                 MD Holden                                                                   Ordering Location:     [facility]   Received:            08/16/2019 06:46 PM                                 OR                                                                           Pathologist:           Kirill Wayne MD                                                         Specimen:    Large Intestine, Rectum, Rectal polyp x 1                                                 • Final Diagnosis 08/16/2019    Final                    Value:This result contains rich text formatting which cannot be displayed here.   • Gross Description 08/16/2019    Final                    Value:This result contains rich text formatting which cannot be displayed here.   • Microscopic Description 08/16/2019    Final                    Value:This result contains rich text formatting which cannot be displayed here.     See labs dated 10/17/2019    Body mass index is 32.01 kg/m².    Assessment/Plan   Patient Self-Management and Personalized Health Advice  The patient has been provided with information about: diet, exercise, weight management and prevention of cardiac or vascular disease and preventive services including:   · Annual Wellness Visit (AWV)  · Cardiovascular Disease Screening Tests (may do this order every 5 years in beneficiaries without signs or symptoms of cardiovascular disease)  · Colorectal Cancer Screening, Colonoscopy  · Glaucoma screening (for individuals with diabetes mellitus, family history of glaucoma, -Americans (> or =) age 50, -Americans (> or =) age 65)  · Influenza Vaccine and Administration  · Prostate Cancer Screening .    Discussed the patient's BMI with him. The BMI is in the acceptable  range.    Orders:  No orders of the defined types were placed in this encounter.      Follow Up:  Return in about 4 months (around 2/24/2020).     An After Visit Summary and PPPS with all of these plans were given to the patient.

## 2019-10-24 NOTE — PROGRESS NOTES
QUICK REFERENCE INFORMATION:  The ABCs of Providing the Welcome to Medicare Wellness Visit   CMS.[facility] Learning Network    [facility]    Subjective   History of Present Illness    Krishna Le is a 69 y.o. male an established patient presenting for a Welcome to Medicare Visit. In addition, we addressed the following health issues: ***      PMH, PSH, SocHx, FamHx, Allergies, and Medications: Reviewed and updated in the Visit Navigator.     Outpatient Medications Prior to Visit   Medication Sig Dispense Refill   • aspirin 81 MG chewable tablet Chew.     • atorvastatin (LIPITOR) 80 MG tablet Take 1 tablet by mouth Daily. 90 tablet 1   • betamethasone dipropionate (DIPROLENE) 0.05 % ointment Apply  topically 2 (Two) Times a Day.     • Cetirizine HCl (ALLERGY RELIEF) 10 MG capsule Take  by mouth.     • cholecalciferol (VITAMIN D3) 1000 UNITS tablet Take 10,000 Units by mouth Daily.     • Dupilumab, Asthma, (DUPIXENT SC) Inject  under the skin into the appropriate area as directed.     • fenofibrate 160 MG tablet TAKE ONE TABLET BY MOUTH DAILY 90 tablet 1   • Nutritional Supplements (GLUCOSAMINE FORTE) capsule Take  by mouth.     • tacrolimus (PROTOPIC) 0.1 % ointment      • UNKNOWN TO PATIENT OTC allergy nasal spray     • vitamin C (ASCORBIC ACID) 500 MG tablet Take 500 mg by mouth Daily.       No facility-administered medications prior to visit.        Patient Active Problem List   Diagnosis   • Hyperlipidemia   • Glucose intolerance (impaired glucose tolerance)   • Erectile dysfunction   • Vitamin D deficiency   • Exogenous obesity   • Adenomatous polyp of colon       Health Habits:  Dental Exam. up to date  Eye Exam. up to date  Exercise: 0 times/week.  Current exercise activities include: walking.    Social:  See review in SnapShot activity and in SocHx section of Visit Navigator.    Health Risk Assessment:  The patient has completed a Health Risk Assessment. This has been reviewed with them and has  been scanned into [employer] Mobile as a separate document.    Current Medical Providers:  Patient Care Team:  Papo Anand DO as PCP - General (Family Medicine)  Katya Toussaint MD as PCP - Claims Attributed    The [facility] providers who are involved in the care of this patient are listed above. Additional providers and suppliers are listed below:  ***    Recent Hospitalizations:  No recent hospitalization(s)..     Age-appropriate Screening Schedule:  Refer to the list below for future screening recommendations based on patient's age. Orders for these recommended tests are listed in the plan section. The patient has been provided with a written plan.    Health Maintenance   Topic Date Due   • DIABETIC FOOT EXAM  10/23/2019   • URINE MICROALBUMIN  10/23/2019   • DIABETIC EYE EXAM  12/04/2019   • HEMOGLOBIN A1C  04/17/2020   • LIPID PANEL  10/17/2020   • MEDICARE ANNUAL WELLNESS  10/24/2020   • COLONOSCOPY  08/16/2024   • TDAP/TD VACCINES (2 - Td) 04/26/2027   • HEPATITIS C SCREENING  Completed   • INFLUENZA VACCINE  Completed   • PNEUMOCOCCAL VACCINES (65+ LOW/MEDIUM RISK)  Completed   • ZOSTER VACCINE  Discontinued       Depression Screen:   PHQ-2/PHQ-9 Depression Screening 10/24/2019   Little interest or pleasure in doing things 0   Feeling down, depressed, or hopeless 0   Trouble falling or staying asleep, or sleeping too much -   Feeling tired or having little energy -   Poor appetite or overeating -   Feeling bad about yourself - or that you are a failure or have let yourself or your family down -   Trouble concentrating on things, such as reading the newspaper or watching television -   Moving or speaking so slowly that other people could have noticed. Or the opposite - being so fidgety or restless that you have been moving around a lot more than usual -   Thoughts that you would be better off dead, or of hurting yourself in some way -   Total Score 0       Functional and Cognitive Screening:  Functional &  "Cognitive Status 10/24/2019   Do you have difficulty preparing food and eating? No   Do you have difficulty bathing yourself, getting dressed or grooming yourself? No   Do you have difficulty using the toilet? No   Do you have difficulty moving around from place to place? No   Do you have trouble with steps or getting out of a bed or a chair? No   Current Diet Well Balanced Diet   Dental Exam Up to date   Eye Exam Up to date   Exercise (times per week) 2 times per week   Current Exercise Activities Include Yard Work   Do you need help using the phone?  No   Are you deaf or do you have serious difficulty hearing?  Yes   Do you need help with transportation? No   Do you need help shopping? No   Do you need help preparing meals?  No   Do you need help with housework?  No   Do you need help with laundry? No   Do you need help taking your medications? No   Do you need help managing money? No   Do you ever drive or ride in a car without wearing a seat belt? No   Have you felt unusual stress, anger or loneliness in the last month? No   Who do you live with? Spouse   If you need help, do you have trouble finding someone available to you? No   Do you have difficulty concentrating, remembering or making decisions? No       Does the patient have evidence of cognitive impairment? No    Advanced Care Planning:  Patient has an advance directive - a copy has not been provided. Have asked the patient to send this to us to add to record    Identification of Risk Factors:  Risk factors include: {; WELLNESS RISK FACTORS:87323}.    Review of Systems    Pertinent items are noted in HPI.    Objective    Physical Exam    {physical exam; choose systems:8798598966}    Vitals:    10/24/19 0916   BP: 132/80   Pulse: 81   SpO2: 93%   Weight: 107 kg (236 lb)   Height: 182.9 cm (72\")       Body mass index is 32.01 kg/m².    Assessment/Plan   Patient Self-Management and Personalized Health Advice  The patient has been provided with information " about: diet, exercise, weight management and prevention of cardiac or vascular disease and preventive services including:   · {PLAN:18563}.    Discussed the patient's BMI with him. The BMI is in the acceptable range.    Orders:  No orders of the defined types were placed in this encounter.      Follow Up:  No Follow-up on file.     An After Visit Summary and PPPS with all of these plans were given to the patient.

## 2019-10-26 PROBLEM — E11.9 TYPE 2 DIABETES MELLITUS WITHOUT COMPLICATION, WITHOUT LONG-TERM CURRENT USE OF INSULIN: Status: ACTIVE | Noted: 2019-10-26

## 2019-10-26 RX ORDER — FENOFIBRATE 160 MG/1
160 TABLET ORAL DAILY
Qty: 90 TABLET | Refills: 1 | Status: SHIPPED | OUTPATIENT
Start: 2019-10-26 | End: 2020-05-28

## 2019-10-26 RX ORDER — ATORVASTATIN CALCIUM 80 MG/1
80 TABLET, FILM COATED ORAL DAILY
Qty: 90 TABLET | Refills: 1 | Status: SHIPPED | OUTPATIENT
Start: 2019-10-26 | End: 2020-06-15 | Stop reason: SDUPTHER

## 2019-10-26 NOTE — PATIENT INSTRUCTIONS
Patient is a new diagnosed type II diabetic and is strongly requesting not to go on medication as of yet.  He will work over the next 2 to 3 months with anticipation of labs in approximately 4 months and follow-up with me thereafter.  If sugar status remains unchanged or worse medications will be a initiated.  Atorvastatin and fenofibrate will continue without change although LDL needs to decrease.  He will also deal with this in a dietary fashion.  Preventative counseling provided

## 2019-12-11 NOTE — BRIEF OP NOTE
History     Chief Complaint   Patient presents with     Pharyngitis     started today     HPI  Joyce Shrestha is a 56 year old female who presents to the emergency department with approximately 1 day history of sore throat.  Patient is concerned because her grandchildren have been ill at the [facility], with RSV.  She has been exposed to them, and they are currently on oxygen therapy, and patient does not wish to be around these individuals if she is contagious.  Therefore, patient requesting additional testing.  Patient also reports slight amounts of tenderness of the lymph nodes of the chin region.  No cough.  No abdominal pain.    Allergies:  No Known Allergies    Problem List:    Patient Active Problem List    Diagnosis Date Noted     RENATA (stress urinary incontinence, female) 12/14/2016     Priority: Medium     Atrophic vaginitis 12/14/2016     Priority: Medium     Hypothyroidism (acquired) 11/28/2014     Priority: Medium     Heart murmur 02/18/2013     Priority: Medium     CARDIOVASCULAR SCREENING; LDL GOAL LESS THAN 160 10/31/2010     Priority: Medium     Fibrocystic breast disease 07/09/2009     Priority: Medium        Past Medical History:    Past Medical History:   Diagnosis Date     Heart murmur 2/18/2013     Hypothyroid        Past Surgical History:    Past Surgical History:   Procedure Laterality Date     DILATION AND CURETTAGE, HYSTEROSCOPY DIAGNOSTIC, COMBINED N/A 1/24/2017    Procedure: COMBINED DILATION AND CURETTAGE, HYSTEROSCOPY DIAGNOSTIC;  Surgeon: Armida Page MD;  Location: [facility] OR     ROTATOR CUFF REPAIR RT/LT  2011    right     SLING TRANSOBTURATOR N/A 1/24/2017    Procedure: SLING TRANSOBTURATOR;  Surgeon: Armida Page MD;  Location: [facility] OR     SURGICAL HISTORY OF -   1976    B/L Thumb Repair     SURGICAL HISTORY OF -   1977    right long finger repair       Family History:    Family History   Problem Relation Age of Onset     Heart Disease  COLONOSCOPY  Progress Note    Krishna Le  8/16/2019    Pre-op Diagnosis:   Adenomatous polyp of colon, unspecified part of colon [D12.6]       Post-Op Diagnosis Codes:     * Adenomatous polyp of colon, unspecified part of colon [D12.6]     * Colon polyp [K63.5]    Procedure/CPT® Codes:      Procedure(s):  COLONOSCOPY, polypectomy    Surgeon(s):  Colin Rojas MD    Anesthesia: Monitored Anesthesia Care    Staff:   Circulator: Kayli Hughes RN  Scrub Person: Marj Orosco    Estimated Blood Loss: none    Urine Voided: * No values recorded between 8/16/2019 12:49 PM and 8/16/2019  1:24 PM *    Specimens:                Specimens     ID Source Type Tests Collected By Collected At Frozen?      A Large Intestine, Rectum Polyp · TISSUE PATHOLOGY EXAM   Colin Rojas MD 8/16/19 7749      Description: Rectal polyp x 1                Drains:      Findings: Colon to TI good Prep  Single small polyp    Complications: None      Colin Rojas MD     Date: 8/16/2019  Time: 1:25 PM       "Father      Cancer Father      Diabetes Father      Prostate Cancer Father      Heart Disease Paternal Grandmother      Unknown/Adopted Paternal Grandmother      Heart Disease Paternal Grandfather      Heart Disease Brother      Heart Disease Sister      Thyroid Disease Sister      Heart Disease Brother      Heart Disease Brother      Genitourinary Problems Sister      Thyroid Disease Mother      Breast Cancer Maternal Grandmother      Alzheimer Disease Maternal Grandfather      Thyroid Disease Daughter      Melanoma No family hx of        Social History:  Marital Status:   [2]  Social History     Tobacco Use     Smoking status: Never Smoker     Smokeless tobacco: Never Used   Substance Use Topics     Alcohol use: Yes     Alcohol/week: 1.7 standard drinks     Comment: 1 drink beer or mixce 5 times a week     Drug use: No        Medications:    ibuprofen (ADVIL/MOTRIN) 600 MG tablet  levothyroxine (SYNTHROID/LEVOTHROID) 75 MCG tablet          Review of Systems   Constitutional: Negative.  Negative for fever.   HENT: Positive for sore throat.    Eyes: Negative for pain and redness.   Respiratory: Negative for cough and shortness of breath.        Physical Exam   BP: 136/80  Heart Rate: 77  Temp: 98  F (36.7  C)  Resp: 18  Height: 172.7 cm (5' 8\")  Weight: 61.2 kg (135 lb)  SpO2: 99 %      Physical Exam  /80   Temp 98  F (36.7  C) (Temporal)   Resp 18   Ht 1.727 m (5' 8\")   Wt 61.2 kg (135 lb)   LMP 09/24/2013   SpO2 99%   BMI 20.53 kg/m    General: alert and in no acute distress  Head: atraumatic, normocephalic  Oropharynx:  Slight redness.  No exudate.    Skin: no rashes, no diaphoresis and skin color normal  Neuro: Patient awake, alert, oriented, speech is fluent, gait is normal      ED Course        Procedures               Critical Care time:  none               Results for orders placed or performed during the hospital encounter of 12/10/19 (from the past 24 hour(s))   Rapid Strep Screen "   Result Value Ref Range    Specimen Description Throat     Rapid Strep A Screen       NEGATIVE: No Group A streptococcal antigen detected by immunoassay, await culture report.   Influenza A/B antigen   Result Value Ref Range    Influenza A/B Agn Specimen Nasal     Influenza A Negative NEG^Negative    Influenza B Negative NEG^Negative       Medications - No data to display    Assessments & Plan (with Medical Decision Making)  56 year old female, presenting to the emergency department with sore throat, and tender sub-mandibular lymphadenopathy.  Present over the past 1 day.  Patient is afebrile, well-appearing, and does have slight congestion, and clear rhinorrhea.  Patient clinically with viral type symptoms.  Has been exposed to other individuals with viral type infection.  Patient will be discharged home, and instructed on general cautions, with washing hands, and general hygiene.     I have reviewed the nursing notes.    I have reviewed the findings, diagnosis, plan and need for follow up with the patient.       Discharge Medication List as of 12/10/2019 10:19 PM          Final diagnoses:   Acute pharyngitis, unspecified etiology       12/10/2019   [facility] EMERGENCY DEPARTMENT     Emanuel Zimmer MD  12/10/19 3302

## 2020-03-05 DIAGNOSIS — E78.2 MIXED HYPERLIPIDEMIA: Primary | ICD-10-CM

## 2020-03-05 DIAGNOSIS — E11.9 TYPE 2 DIABETES MELLITUS WITHOUT COMPLICATION, WITHOUT LONG-TERM CURRENT USE OF INSULIN (HCC): ICD-10-CM

## 2020-03-05 DIAGNOSIS — E55.9 VITAMIN D DEFICIENCY: ICD-10-CM

## 2020-05-28 DIAGNOSIS — E78.2 MIXED HYPERLIPIDEMIA: ICD-10-CM

## 2020-05-28 RX ORDER — FENOFIBRATE 160 MG/1
TABLET ORAL
Qty: 90 TABLET | Refills: 0 | Status: SHIPPED | OUTPATIENT
Start: 2020-05-28 | End: 2020-08-18 | Stop reason: SDUPTHER

## 2020-06-11 LAB
25(OH)D3+25(OH)D2 SERPL-MCNC: 82.6 NG/ML (ref 30–100)
ALBUMIN SERPL-MCNC: 4.2 G/DL (ref 3.5–5.2)
ALBUMIN/CREAT UR: 9 MG/G CREAT (ref 0–29)
ALBUMIN/GLOB SERPL: 0.9 G/DL
ALP SERPL-CCNC: 52 U/L (ref 39–117)
ALT SERPL-CCNC: 70 U/L (ref 1–41)
AST SERPL-CCNC: 38 U/L (ref 1–40)
BASOPHILS # BLD AUTO: 0.05 10*3/MM3 (ref 0–0.2)
BASOPHILS NFR BLD AUTO: 1 % (ref 0–1.5)
BILIRUB SERPL-MCNC: 0.7 MG/DL (ref 0.2–1.2)
BUN SERPL-MCNC: 16 MG/DL (ref 8–23)
BUN/CREAT SERPL: 15.8 (ref 7–25)
CALCIUM SERPL-MCNC: 9.6 MG/DL (ref 8.6–10.5)
CHLORIDE SERPL-SCNC: 101 MMOL/L (ref 98–107)
CHOLEST SERPL-MCNC: 159 MG/DL (ref 0–200)
CO2 SERPL-SCNC: 24.8 MMOL/L (ref 22–29)
CREAT SERPL-MCNC: 1.01 MG/DL (ref 0.76–1.27)
CREAT UR-MCNC: 169.4 MG/DL
EOSINOPHIL # BLD AUTO: 0.15 10*3/MM3 (ref 0–0.4)
EOSINOPHIL NFR BLD AUTO: 3.1 % (ref 0.3–6.2)
ERYTHROCYTE [DISTWIDTH] IN BLOOD BY AUTOMATED COUNT: 12.6 % (ref 12.3–15.4)
GLOBULIN SER CALC-MCNC: 4.5 GM/DL
GLUCOSE SERPL-MCNC: 294 MG/DL (ref 65–99)
HBA1C MFR BLD: 10.5 % (ref 4.8–5.6)
HCT VFR BLD AUTO: 46.1 % (ref 37.5–51)
HDLC SERPL-MCNC: 42 MG/DL (ref 40–60)
HGB BLD-MCNC: 15.5 G/DL (ref 13–17.7)
IMM GRANULOCYTES # BLD AUTO: 0.01 10*3/MM3 (ref 0–0.05)
IMM GRANULOCYTES NFR BLD AUTO: 0.2 % (ref 0–0.5)
LDLC SERPL CALC-MCNC: 98 MG/DL (ref 0–100)
LYMPHOCYTES # BLD AUTO: 1.41 10*3/MM3 (ref 0.7–3.1)
LYMPHOCYTES NFR BLD AUTO: 29.4 % (ref 19.6–45.3)
MCH RBC QN AUTO: 30.7 PG (ref 26.6–33)
MCHC RBC AUTO-ENTMCNC: 33.6 G/DL (ref 31.5–35.7)
MCV RBC AUTO: 91.3 FL (ref 79–97)
MICROALBUMIN UR-MCNC: 15.7 UG/ML
MONOCYTES # BLD AUTO: 0.48 10*3/MM3 (ref 0.1–0.9)
MONOCYTES NFR BLD AUTO: 10 % (ref 5–12)
NEUTROPHILS # BLD AUTO: 2.69 10*3/MM3 (ref 1.7–7)
NEUTROPHILS NFR BLD AUTO: 56.3 % (ref 42.7–76)
NRBC BLD AUTO-RTO: 0 /100 WBC (ref 0–0.2)
PLATELET # BLD AUTO: 244 10*3/MM3 (ref 140–450)
POTASSIUM SERPL-SCNC: 4.3 MMOL/L (ref 3.5–5.2)
PROT SERPL-MCNC: 8.7 G/DL (ref 6–8.5)
RBC # BLD AUTO: 5.05 10*6/MM3 (ref 4.14–5.8)
SODIUM SERPL-SCNC: 135 MMOL/L (ref 136–145)
TRIGL SERPL-MCNC: 94 MG/DL (ref 0–150)
VLDLC SERPL CALC-MCNC: 18.8 MG/DL
WBC # BLD AUTO: 4.79 10*3/MM3 (ref 3.4–10.8)

## 2020-06-15 ENCOUNTER — OFFICE VISIT (OUTPATIENT)
Dept: FAMILY MEDICINE CLINIC | Facility: CLINIC | Age: 70
End: 2020-06-15

## 2020-06-15 VITALS
HEART RATE: 81 BPM | BODY MASS INDEX: 31.15 KG/M2 | OXYGEN SATURATION: 96 % | HEIGHT: 72 IN | TEMPERATURE: 97.1 F | WEIGHT: 230 LBS | DIASTOLIC BLOOD PRESSURE: 80 MMHG | SYSTOLIC BLOOD PRESSURE: 130 MMHG

## 2020-06-15 DIAGNOSIS — E78.2 MIXED HYPERLIPIDEMIA: ICD-10-CM

## 2020-06-15 DIAGNOSIS — E66.09 EXOGENOUS OBESITY: ICD-10-CM

## 2020-06-15 DIAGNOSIS — E11.9 TYPE 2 DIABETES MELLITUS WITHOUT COMPLICATION, WITHOUT LONG-TERM CURRENT USE OF INSULIN (HCC): Primary | ICD-10-CM

## 2020-06-15 DIAGNOSIS — E55.9 VITAMIN D DEFICIENCY: ICD-10-CM

## 2020-06-15 PROCEDURE — 99213 OFFICE O/P EST LOW 20 MIN: CPT | Performed by: FAMILY MEDICINE

## 2020-06-15 RX ORDER — ATORVASTATIN CALCIUM 80 MG/1
80 TABLET, FILM COATED ORAL DAILY
Qty: 90 TABLET | Refills: 1 | Status: SHIPPED | OUTPATIENT
Start: 2020-06-15 | End: 2021-01-11

## 2020-06-15 RX ORDER — BLOOD-GLUCOSE METER
KIT MISCELLANEOUS
Qty: 1 EACH | Refills: 0 | Status: SHIPPED | OUTPATIENT
Start: 2020-06-15

## 2020-06-15 NOTE — PROGRESS NOTES
Subjective   Krishna Le is a 69 y.o. male with   Chief Complaint   Patient presents with   • Hyperlipidemia   .    History of Present Illness   69-year-old white male with known history of type II non-insulin-dependent diabetes mellitus as well as hyperlipidemia here for further medical management.  Fasting labs have been acquired prior to this visit.  Current medications include atorvastatin at 80 mg daily and at one time patient was using metformin.  He felt that the metformin was causing myalgias so he self discontinued this.  He is in essence been on no medication for diabetes and has been eating indiscriminately as well as drinking too much alcohol.  He will often have anywhere from 2 to 4 glasses of wine per night and later have bourbon to the tune of 1 or 2 highballs per night.  He does not check his sugars and nor does he have a glucometer.  Fasting labs have been acquired prior to this visit.  Patient is also using fenofibrate at 160 mg daily.  The following portions of the patient's history were reviewed and updated as appropriate: allergies, current medications, past family history, past medical history, past social history, past surgical history and problem list.    Review of Systems   Constitutional:        Obesity   Cardiovascular:        Mixed hyperlipidemia   Endocrine:        Type II non-insulin-dependent diabetes mellitus       Objective     Vitals:    06/15/20 0948   BP: 130/80   Pulse: 81   Temp: 97.1 °F (36.2 °C)   SpO2: 96%       Recent Results (from the past 672 hour(s))   CBC & Differential    Collection Time: 06/10/20 10:45 AM   Result Value Ref Range    WBC 4.79 3.40 - 10.80 10*3/mm3    RBC 5.05 4.14 - 5.80 10*6/mm3    Hemoglobin 15.5 13.0 - 17.7 g/dL    Hematocrit 46.1 37.5 - 51.0 %    MCV 91.3 79.0 - 97.0 fL    MCH 30.7 26.6 - 33.0 pg    MCHC 33.6 31.5 - 35.7 g/dL    RDW 12.6 12.3 - 15.4 %    Platelets 244 140 - 450 10*3/mm3    Neutrophil Rel % 56.3 42.7 - 76.0 %    Lymphocyte Rel  % 29.4 19.6 - 45.3 %    Monocyte Rel % 10.0 5.0 - 12.0 %    Eosinophil Rel % 3.1 0.3 - 6.2 %    Basophil Rel % 1.0 0.0 - 1.5 %    Neutrophils Absolute 2.69 1.70 - 7.00 10*3/mm3    Lymphocytes Absolute 1.41 0.70 - 3.10 10*3/mm3    Monocytes Absolute 0.48 0.10 - 0.90 10*3/mm3    Eosinophils Absolute 0.15 0.00 - 0.40 10*3/mm3    Basophils Absolute 0.05 0.00 - 0.20 10*3/mm3    Immature Granulocyte Rel % 0.2 0.0 - 0.5 %    Immature Grans Absolute 0.01 0.00 - 0.05 10*3/mm3    nRBC 0.0 0.0 - 0.2 /100 WBC   Comprehensive Metabolic Panel    Collection Time: 06/10/20 10:45 AM   Result Value Ref Range    Glucose 294 (H) 65 - 99 mg/dL    BUN 16 8 - 23 mg/dL    Creatinine 1.01 0.76 - 1.27 mg/dL    eGFR Non African Am 73 >60 mL/min/1.73    eGFR African Am 89 >60 mL/min/1.73    BUN/Creatinine Ratio 15.8 7.0 - 25.0    Sodium 135 (L) 136 - 145 mmol/L    Potassium 4.3 3.5 - 5.2 mmol/L    Chloride 101 98 - 107 mmol/L    Total CO2 24.8 22.0 - 29.0 mmol/L    Calcium 9.6 8.6 - 10.5 mg/dL    Total Protein 8.7 (H) 6.0 - 8.5 g/dL    Albumin 4.20 3.50 - 5.20 g/dL    Globulin 4.5 gm/dL    A/G Ratio 0.9 g/dL    Total Bilirubin 0.7 0.2 - 1.2 mg/dL    Alkaline Phosphatase 52 39 - 117 U/L    AST (SGOT) 38 1 - 40 U/L    ALT (SGPT) 70 (H) 1 - 41 U/L   Hemoglobin A1c    Collection Time: 06/10/20 10:45 AM   Result Value Ref Range    Hemoglobin A1C 10.50 (H) 4.80 - 5.60 %   Lipid Panel    Collection Time: 06/10/20 10:45 AM   Result Value Ref Range    Total Cholesterol 159 0 - 200 mg/dL    Triglycerides 94 0 - 150 mg/dL    HDL Cholesterol 42 40 - 60 mg/dL    VLDL Cholesterol 18.8 mg/dL    LDL Cholesterol  98 0 - 100 mg/dL   Vitamin D 25 Hydroxy    Collection Time: 06/10/20 10:45 AM   Result Value Ref Range    25 Hydroxy, Vitamin D 82.6 30.0 - 100.0 ng/ml   Microalbumin / Creatinine Urine Ratio - Urine, Clean Catch    Collection Time: 06/10/20 10:45 AM   Result Value Ref Range    Creatinine, Urine 169.4 Not Estab. mg/dL    Microalbumin, Urine 15.7 Not  Estab. ug/mL    Microalbumin/Creatinine Ratio 9 0 - 29 mg/g creat       Physical Exam   Constitutional: He is oriented to person, place, and time. He appears well-developed and well-nourished.   HENT:   Head: Normocephalic and atraumatic.   Neck: Trachea normal and phonation normal. Neck supple. Normal carotid pulses present. Carotid bruit is not present. No thyroid mass and no thyromegaly present.   Cardiovascular: Normal rate, regular rhythm and normal heart sounds. Exam reveals no gallop and no friction rub.   No murmur heard.  Pulmonary/Chest: Effort normal and breath sounds normal. No respiratory distress. He has no decreased breath sounds. He has no wheezes. He has no rhonchi. He has no rales.   Lymphadenopathy:     He has no cervical adenopathy.   Neurological: He is alert and oriented to person, place, and time.   Skin: Skin is warm and dry. No rash noted.   Psychiatric: He has a normal mood and affect. His speech is normal and behavior is normal. Judgment and thought content normal. Cognition and memory are normal.   Nursing note and vitals reviewed.      Assessment/Plan   Krishna was seen today for hyperlipidemia.    Diagnoses and all orders for this visit:    Type 2 diabetes mellitus without complication, without long-term current use of insulin (CMS/[facility])  -     metFORMIN (GLUCOPHAGE) 1000 MG tablet; Take 1 tablet by mouth 2 (Two) Times a Day With Meals.  -     glucose blood test strip; Use as instructed to check blood sugar once daily  -     glucose monitor monitoring kit; Use to check blood sugar once daily  -     Lancets Misc. kit; Use stylus and lancet to check blood sugar once daily    Mixed hyperlipidemia  -     atorvastatin (Lipitor) 80 MG tablet; Take 1 tablet by mouth Daily.    Exogenous obesity    Vitamin D deficiency    Weight loss is essential as well as and improve dietary regimen- must markedly reduce alcohol intake per day.  Must also lower cholesterol in the diet and anticipate follow-up in  4 weeks with glucometer numbers on a fasting basis.  Will likely need further medication then metformin.  Patient will contact this office with any adverse effects of the above medication.    Return in about 4 weeks (around 7/13/2020) for Recheck.

## 2020-07-14 ENCOUNTER — OFFICE VISIT (OUTPATIENT)
Dept: FAMILY MEDICINE CLINIC | Facility: CLINIC | Age: 70
End: 2020-07-14

## 2020-07-14 VITALS
DIASTOLIC BLOOD PRESSURE: 82 MMHG | WEIGHT: 230 LBS | HEART RATE: 77 BPM | OXYGEN SATURATION: 98 % | HEIGHT: 72 IN | TEMPERATURE: 97.6 F | BODY MASS INDEX: 31.15 KG/M2 | SYSTOLIC BLOOD PRESSURE: 124 MMHG

## 2020-07-14 DIAGNOSIS — L20.84 INTRINSIC ATOPIC DERMATITIS: ICD-10-CM

## 2020-07-14 DIAGNOSIS — E11.9 TYPE 2 DIABETES MELLITUS WITHOUT COMPLICATION, WITHOUT LONG-TERM CURRENT USE OF INSULIN (HCC): Primary | ICD-10-CM

## 2020-07-14 PROCEDURE — 99213 OFFICE O/P EST LOW 20 MIN: CPT | Performed by: FAMILY MEDICINE

## 2020-07-14 RX ORDER — EPINEPHRINE 0.3 MG/.3ML
0.3 INJECTION SUBCUTANEOUS ONCE
Qty: 1 EACH | Refills: 3 | Status: SHIPPED | OUTPATIENT
Start: 2020-07-14 | End: 2020-07-14

## 2020-07-14 RX ORDER — GLIMEPIRIDE 2 MG/1
2 TABLET ORAL
Qty: 30 TABLET | Refills: 1 | Status: SHIPPED | OUTPATIENT
Start: 2020-07-14 | End: 2020-08-04 | Stop reason: SDUPTHER

## 2020-07-14 NOTE — PROGRESS NOTES
"Subjective   Krishna Le is a 70 y.o. male with   Chief Complaint   Patient presents with   • Diabetes     pt also needs an epi pen   .    History of Present Illness   70-year-old white male with type II non-insulin-dependent diabetes mellitus here for further medical management.  Patient has been prescribed metformin at thousand milligrams twice daily but states that for some reason he is only been taking this 1 time per day.  He had weaned himself off all diabetic medications at one time and had labs in mid June of this year.  At that time his hemoglobin A1c was 10.5.  He brings glucometer numbers with him which are listed below.  He is on no other diabetic medication.  He is also requesting refill of an EpiPen given his use of Dupixent which he states he uses for \"eczema\".  The following portions of the patient's history were reviewed and updated as appropriate: allergies, current medications, past family history, past medical history, past social history, past surgical history and problem list.    Review of Systems   Constitutional:        Exogenous obesity   Endocrine:        Type II non-insulin-dependent diabetes mellitus   Skin: Positive for rash.       Objective     Vitals:    07/14/20 0852   BP: 124/82   Pulse: 77   Temp: 97.6 °F (36.4 °C)   SpO2: 98%       No results found for this or any previous visit (from the past 672 hour(s)).    Physical Exam   Constitutional: He is oriented to person, place, and time. He appears well-developed and well-nourished.   HENT:   Head: Normocephalic and atraumatic.   Neck: Trachea normal and phonation normal. Neck supple. Normal carotid pulses present. Carotid bruit is not present. No thyroid mass and no thyromegaly present.   Cardiovascular: Normal rate, regular rhythm and normal heart sounds. Exam reveals no gallop and no friction rub.   No murmur heard.  Pulmonary/Chest: Effort normal and breath sounds normal. No respiratory distress. He has no decreased breath " sounds. He has no wheezes. He has no rhonchi. He has no rales.   Lymphadenopathy:     He has no cervical adenopathy.   Neurological: He is alert and oriented to person, place, and time.   Skin: Skin is warm and dry. No rash noted.   Psychiatric: He has a normal mood and affect. His speech is normal and behavior is normal. Judgment and thought content normal. Cognition and memory are normal.   Nursing note and vitals reviewed.  Fasting blood sugars by glucometer with most recent first are as follows 202, 196, 220, 165, 193, 202, 232, 247, 204, 214, 239, 261, 219, 227, 246, 294.    Assessment/Plan   Krishna was seen today for diabetes.    Diagnoses and all orders for this visit:    Type 2 diabetes mellitus without complication, without long-term current use of insulin (CMS/[facility])  -     glimepiride (Amaryl) 2 MG tablet; Take 1 tablet by mouth Every Morning Before Breakfast.    Intrinsic atopic dermatitis  -     EPINEPHrine (EpiPen 2-Brooks) 0.3 MG/0.3ML solution auto-injector injection; Inject 0.3 mL under the skin into the appropriate area as directed 1 (One) Time for 1 dose.        Return in about 4 weeks (around 8/11/2020) for Recheck.

## 2020-08-04 ENCOUNTER — TELEPHONE (OUTPATIENT)
Dept: FAMILY MEDICINE CLINIC | Facility: CLINIC | Age: 70
End: 2020-08-04

## 2020-08-04 DIAGNOSIS — E11.9 TYPE 2 DIABETES MELLITUS WITHOUT COMPLICATION, WITHOUT LONG-TERM CURRENT USE OF INSULIN (HCC): ICD-10-CM

## 2020-08-04 RX ORDER — GLIMEPIRIDE 2 MG/1
2 TABLET ORAL
Qty: 30 TABLET | Refills: 0 | Status: SHIPPED | OUTPATIENT
Start: 2020-08-04 | End: 2020-08-18 | Stop reason: SDUPTHER

## 2020-08-04 NOTE — TELEPHONE ENCOUNTER
Caller: Krishna Le    Relationship: Self    Best call back number: [phone]    Medication needed:   Requested Prescriptions     Pending Prescriptions Disp Refills   • glimepiride (Amaryl) 2 MG tablet 30 tablet 1     Sig: Take 1 tablet by mouth Every Morning Before Breakfast.       When do you need the refill by: AS SOON AS POSSIBLE    What details did the patient provide when requesting the medication: PATIENT STATES THAT HE WILL HAVE TO GO OUT OF TOWN AND THE PLACE HE IS TRAVELING TO HE WILL NEED TO QUARANTINE FOR 2 WEEKS ONCE HE GETS BACK. PATIENT REQUESTED TO KNOW IF HE WOULD BE ABLE TO STILL COME IN, DO A VIDEO VISIT OR JUST PUSH HIS APPT UNTIL NEXT MONTH. PATIENT REQUESTED TO GET A PHONE CALL ABOUT THIS MATTER. PATIENT ALSO STATES THAT HE WILL RUN OUT OF HIS MEDICATION BEFORE HE COME BACK INTO TOWN. PATIENT REQUESTED TO GET THIS REFILL, PATIENT STATES  HE LEAVES TOMORROW MORNING AROUND 5AM.    Does the patient have less than a 3 day supply:  [x] Yes  [] No    What is the patient's preferred pharmacy: [facility] [address] [facility] [phone] [facility] [phone]

## 2020-08-10 DIAGNOSIS — E11.9 TYPE 2 DIABETES MELLITUS WITHOUT COMPLICATION, WITHOUT LONG-TERM CURRENT USE OF INSULIN (HCC): ICD-10-CM

## 2020-08-18 ENCOUNTER — OFFICE VISIT (OUTPATIENT)
Dept: FAMILY MEDICINE CLINIC | Facility: CLINIC | Age: 70
End: 2020-08-18

## 2020-08-18 VITALS
BODY MASS INDEX: 31.42 KG/M2 | DIASTOLIC BLOOD PRESSURE: 80 MMHG | OXYGEN SATURATION: 98 % | SYSTOLIC BLOOD PRESSURE: 122 MMHG | WEIGHT: 232 LBS | HEART RATE: 83 BPM | HEIGHT: 72 IN | TEMPERATURE: 97.7 F

## 2020-08-18 DIAGNOSIS — E78.2 MIXED HYPERLIPIDEMIA: ICD-10-CM

## 2020-08-18 DIAGNOSIS — E11.9 TYPE 2 DIABETES MELLITUS WITHOUT COMPLICATION, WITHOUT LONG-TERM CURRENT USE OF INSULIN (HCC): Primary | ICD-10-CM

## 2020-08-18 DIAGNOSIS — E55.9 VITAMIN D DEFICIENCY: ICD-10-CM

## 2020-08-18 DIAGNOSIS — E66.09 EXOGENOUS OBESITY: ICD-10-CM

## 2020-08-18 DIAGNOSIS — R35.1 NOCTURIA: ICD-10-CM

## 2020-08-18 PROCEDURE — 99213 OFFICE O/P EST LOW 20 MIN: CPT | Performed by: FAMILY MEDICINE

## 2020-08-18 RX ORDER — GLIMEPIRIDE 2 MG/1
2 TABLET ORAL
Qty: 90 TABLET | Refills: 1 | Status: SHIPPED | OUTPATIENT
Start: 2020-08-18 | End: 2021-03-24

## 2020-08-18 RX ORDER — FENOFIBRATE 160 MG/1
160 TABLET ORAL DAILY
Qty: 90 TABLET | Refills: 1 | Status: SHIPPED | OUTPATIENT
Start: 2020-08-18 | End: 2021-04-22

## 2020-08-18 NOTE — PROGRESS NOTES
Subjective   Krishna Le is a 70 y.o. male with   Chief Complaint   Patient presents with   • Diabetes   .    History of Present Illness   70-year-old white male with type II non-insulin-dependent diabetes mellitus here for further medical management.  Patient has very much changed his diet and is beginning to exercise more.  He has been keeping fasting glucometer readings and brings these to the appointment today.  Labs were drawn fasting on 6/10/2020 with a fasting blood sugar of 294 and a hemoglobin A1c of 10.5.  Patient is currently using metformin at thousand milligrams twice daily as well as glimepiride at 2 mg daily.  He is using his medications on a regular basis and tolerating them well without side effects.  Overall he is doing well and has no acute complaints.  The following portions of the patient's history were reviewed and updated as appropriate: allergies, current medications, past family history, past medical history, past social history, past surgical history and problem list.    Review of Systems   Endocrine:        Type II non-insulin-dependent diabetes mellitus       Objective     Vitals:    08/18/20 0806   BP: 122/80   Pulse: 83   Temp: 97.7 °F (36.5 °C)   SpO2: 98%       No results found for this or any previous visit (from the past 672 hour(s)).    Physical Exam   Constitutional: He is oriented to person, place, and time. He appears well-developed and well-nourished.   HENT:   Head: Normocephalic and atraumatic.   Neck: Trachea normal and phonation normal. Neck supple. Normal carotid pulses present. Carotid bruit is not present. No thyroid mass and no thyromegaly present.   Cardiovascular: Normal rate, regular rhythm and normal heart sounds. Exam reveals no gallop and no friction rub.   No murmur heard.  Pulmonary/Chest: Effort normal and breath sounds normal. No respiratory distress. He has no decreased breath sounds. He has no wheezes. He has no rhonchi. He has no rales.    Lymphadenopathy:     He has no cervical adenopathy.   Neurological: He is alert and oriented to person, place, and time.   Skin: Skin is warm and dry. No rash noted.   Psychiatric: He has a normal mood and affect. His speech is normal and behavior is normal. Judgment and thought content normal. Cognition and memory are normal.   Nursing note and vitals reviewed.  Fasting blood sugars are as follows 150, 123, 108, 139, 106, 110, 120, 121, 117, 117, 161, 109, 93, 113, 99.    Assessment/Plan   Krishna was seen today for diabetes.    Diagnoses and all orders for this visit:    Type 2 diabetes mellitus without complication, without long-term current use of insulin (CMS/[facility])  -     metFORMIN (GLUCOPHAGE) 1000 MG tablet; Take 1 tablet by mouth 2 (Two) Times a Day With Meals.  -     glimepiride (Amaryl) 2 MG tablet; Take 1 tablet by mouth Every Morning Before Breakfast.  -     CBC w AUTO Differential; Future  -     Lipid panel; Future  -     Hemoglobin A1c; Future  -     Comprehensive metabolic panel; Future  -     Vitamin D 25 hydroxy; Future  -     PSA DIAGNOSTIC ONLY; Future    Vitamin D deficiency  -     CBC w AUTO Differential; Future  -     Lipid panel; Future  -     Hemoglobin A1c; Future  -     Comprehensive metabolic panel; Future  -     Vitamin D 25 hydroxy; Future  -     PSA DIAGNOSTIC ONLY; Future    Exogenous obesity  -     CBC w AUTO Differential; Future  -     Lipid panel; Future  -     Hemoglobin A1c; Future  -     Comprehensive metabolic panel; Future  -     Vitamin D 25 hydroxy; Future  -     PSA DIAGNOSTIC ONLY; Future    Mixed hyperlipidemia  -     fenofibrate 160 MG tablet; Take 1 tablet by mouth Daily.  -     CBC w AUTO Differential; Future  -     Lipid panel; Future  -     Hemoglobin A1c; Future  -     Comprehensive metabolic panel; Future  -     Vitamin D 25 hydroxy; Future  -     PSA DIAGNOSTIC ONLY; Future    Nocturia  -     PSA DIAGNOSTIC ONLY; Future        Return in about 2 months (around  10/18/2020).

## 2020-09-14 ENCOUNTER — OFFICE VISIT (OUTPATIENT)
Dept: FAMILY MEDICINE CLINIC | Facility: CLINIC | Age: 70
End: 2020-09-14

## 2020-09-14 ENCOUNTER — TELEPHONE (OUTPATIENT)
Dept: FAMILY MEDICINE CLINIC | Facility: CLINIC | Age: 70
End: 2020-09-14

## 2020-09-14 ENCOUNTER — HOSPITAL ENCOUNTER (OUTPATIENT)
Dept: GENERAL RADIOLOGY | Facility: HOSPITAL | Age: 70
Discharge: HOME | End: 2020-09-14
Admitting: FAMILY MEDICINE

## 2020-09-14 VITALS
HEIGHT: 72 IN | BODY MASS INDEX: 30.75 KG/M2 | WEIGHT: 227 LBS | TEMPERATURE: 97.7 F | DIASTOLIC BLOOD PRESSURE: 82 MMHG | SYSTOLIC BLOOD PRESSURE: 130 MMHG | OXYGEN SATURATION: 96 % | HEART RATE: 85 BPM

## 2020-09-14 DIAGNOSIS — M25.521 RIGHT ELBOW PAIN: Primary | ICD-10-CM

## 2020-09-14 PROCEDURE — 99213 OFFICE O/P EST LOW 20 MIN: CPT | Performed by: FAMILY MEDICINE

## 2020-09-14 PROCEDURE — 73070 X-RAY EXAM OF ELBOW: CPT

## 2020-09-14 NOTE — TELEPHONE ENCOUNTER
PATIENT IS REQUESTING A REFERRAL TO : [facility] OR [facility].  FOR HIS ELBOW, HE IS HAVING SEVERE PAIN SINCE April . HURT FROM A FALL.   [phone]

## 2020-09-16 NOTE — PROGRESS NOTES
Subjective   Krishna Le is a 70 y.o. male with   Chief Complaint   Patient presents with   • Elbow Pain     right elbow, wakes him up at night   .    History of Present Illness   70-year-old white male with complaint of right elbow pain x2 weeks.  Patient apparently fell with his right arm tucked near his side and his wrist in a dorsiflexion fashion trying to break his fall.  Received pressure that went up his forearm and seem to center in the elbow.  Since that point there are certain positions and functions that cause increased pain in the mid right elbow as well as lateral right elbow.  He has been seen in an urgent care center where they had performed x-rays that were found to be negative.  This included right elbow and wrist as well as entire ulna and radius.  The following portions of the patient's history were reviewed and updated as appropriate: allergies, current medications, past family history, past medical history, past social history, past surgical history and problem list.    Review of Systems   Musculoskeletal: Positive for arthralgias.       Objective     Vitals:    09/14/20 1614   BP: 130/82   Pulse: 85   Temp: 97.7 °F (36.5 °C)   SpO2: 96%       No results found for this or any previous visit (from the past 672 hour(s)).    Physical Exam  Vitals signs and nursing note reviewed.   Constitutional:       Appearance: He is well-developed.   HENT:      Head: Normocephalic and atraumatic.   Neck:      Musculoskeletal: Neck supple.      Thyroid: No thyroid mass or thyromegaly.      Vascular: Normal carotid pulses. No carotid bruit.      Trachea: Trachea and phonation normal.   Cardiovascular:      Rate and Rhythm: Normal rate and regular rhythm.      Heart sounds: Normal heart sounds. No murmur. No friction rub. No gallop.    Pulmonary:      Effort: Pulmonary effort is normal. No respiratory distress.      Breath sounds: Normal breath sounds. No decreased breath sounds, wheezing, rhonchi or rales.    Musculoskeletal:      Comments: Right elbow with full range of motion, motor 5/5 and neurovascular intact distally.  There is increased tenderness in the lateral epicondyles as well as posteriorly just superior to the olecranon.  Right wrist and hand demonstrate full range of motion, motor 5/5 and neurovascular intact distally.   Lymphadenopathy:      Cervical: No cervical adenopathy.   Skin:     General: Skin is warm and dry.      Findings: No rash.   Neurological:      Mental Status: He is alert and oriented to person, place, and time.      Sensory: Sensation is intact.      Motor: Motor function is intact.   Psychiatric:         Speech: Speech normal.         Behavior: Behavior normal.         Thought Content: Thought content normal.         Judgment: Judgment normal.         Assessment/Plan   Krishna was seen today for elbow pain.    Diagnoses and all orders for this visit:    Right elbow pain  -     XR Elbow 2 View Right (In Office)  -     Ambulatory Referral to Orthopedic Surgery        Return if symptoms worsen or fail to improve.

## 2020-09-18 ENCOUNTER — TRANSCRIBE ORDERS (OUTPATIENT)
Dept: ADMINISTRATIVE | Facility: HOSPITAL | Age: 70
End: 2020-09-18

## 2020-09-18 DIAGNOSIS — M25.521 PAIN IN RIGHT ELBOW: Primary | ICD-10-CM

## 2020-09-25 ENCOUNTER — HOSPITAL ENCOUNTER (OUTPATIENT)
Dept: MRI IMAGING | Facility: HOSPITAL | Age: 70
Discharge: HOME | End: 2020-09-25
Admitting: ORTHOPAEDIC SURGERY

## 2020-09-25 DIAGNOSIS — M25.521 PAIN IN RIGHT ELBOW: ICD-10-CM

## 2020-09-25 PROCEDURE — 73221 MRI JOINT UPR EXTREM W/O DYE: CPT

## 2020-10-08 DIAGNOSIS — R35.1 NOCTURIA: ICD-10-CM

## 2020-10-08 DIAGNOSIS — E11.9 TYPE 2 DIABETES MELLITUS WITHOUT COMPLICATION, WITHOUT LONG-TERM CURRENT USE OF INSULIN (HCC): ICD-10-CM

## 2020-10-08 DIAGNOSIS — E66.09 EXOGENOUS OBESITY: ICD-10-CM

## 2020-10-08 DIAGNOSIS — E55.9 VITAMIN D DEFICIENCY: ICD-10-CM

## 2020-10-08 DIAGNOSIS — E78.2 MIXED HYPERLIPIDEMIA: ICD-10-CM

## 2020-10-21 LAB
25(OH)D3+25(OH)D2 SERPL-MCNC: 98.6 NG/ML (ref 30–100)
ALBUMIN SERPL-MCNC: 4.4 G/DL (ref 3.5–5.2)
ALBUMIN/GLOB SERPL: 1.4 G/DL
ALP SERPL-CCNC: 40 U/L (ref 39–117)
ALT SERPL-CCNC: 34 U/L (ref 1–41)
AST SERPL-CCNC: 30 U/L (ref 1–40)
BASOPHILS # BLD AUTO: 0.07 10*3/MM3 (ref 0–0.2)
BASOPHILS NFR BLD AUTO: 1.4 % (ref 0–1.5)
BILIRUB SERPL-MCNC: 0.5 MG/DL (ref 0–1.2)
BUN SERPL-MCNC: 21 MG/DL (ref 8–23)
BUN/CREAT SERPL: 18.6 (ref 7–25)
CALCIUM SERPL-MCNC: 9.8 MG/DL (ref 8.6–10.5)
CHLORIDE SERPL-SCNC: 104 MMOL/L (ref 98–107)
CHOLEST SERPL-MCNC: 170 MG/DL (ref 0–200)
CO2 SERPL-SCNC: 27.4 MMOL/L (ref 22–29)
CREAT SERPL-MCNC: 1.13 MG/DL (ref 0.76–1.27)
EOSINOPHIL # BLD AUTO: 0.11 10*3/MM3 (ref 0–0.4)
EOSINOPHIL NFR BLD AUTO: 2.2 % (ref 0.3–6.2)
ERYTHROCYTE [DISTWIDTH] IN BLOOD BY AUTOMATED COUNT: 13.4 % (ref 12.3–15.4)
GLOBULIN SER CALC-MCNC: 3.2 GM/DL
GLUCOSE SERPL-MCNC: 107 MG/DL (ref 65–99)
HBA1C MFR BLD: 5.8 % (ref 4.8–5.6)
HCT VFR BLD AUTO: 42.9 % (ref 37.5–51)
HDLC SERPL-MCNC: 57 MG/DL (ref 40–60)
HGB BLD-MCNC: 14.5 G/DL (ref 13–17.7)
IMM GRANULOCYTES # BLD AUTO: 0.01 10*3/MM3 (ref 0–0.05)
IMM GRANULOCYTES NFR BLD AUTO: 0.2 % (ref 0–0.5)
LDLC SERPL CALC-MCNC: 102 MG/DL (ref 0–100)
LYMPHOCYTES # BLD AUTO: 1.58 10*3/MM3 (ref 0.7–3.1)
LYMPHOCYTES NFR BLD AUTO: 31.3 % (ref 19.6–45.3)
MCH RBC QN AUTO: 30.3 PG (ref 26.6–33)
MCHC RBC AUTO-ENTMCNC: 33.8 G/DL (ref 31.5–35.7)
MCV RBC AUTO: 89.6 FL (ref 79–97)
MONOCYTES # BLD AUTO: 0.47 10*3/MM3 (ref 0.1–0.9)
MONOCYTES NFR BLD AUTO: 9.3 % (ref 5–12)
NEUTROPHILS # BLD AUTO: 2.81 10*3/MM3 (ref 1.7–7)
NEUTROPHILS NFR BLD AUTO: 55.6 % (ref 42.7–76)
NRBC BLD AUTO-RTO: 0 /100 WBC (ref 0–0.2)
PLATELET # BLD AUTO: 307 10*3/MM3 (ref 140–450)
POTASSIUM SERPL-SCNC: 4.8 MMOL/L (ref 3.5–5.2)
PROT SERPL-MCNC: 7.6 G/DL (ref 6–8.5)
PSA SERPL-MCNC: 3.14 NG/ML (ref 0–4)
RBC # BLD AUTO: 4.79 10*6/MM3 (ref 4.14–5.8)
SODIUM SERPL-SCNC: 140 MMOL/L (ref 136–145)
TRIGL SERPL-MCNC: 55 MG/DL (ref 0–150)
VLDLC SERPL CALC-MCNC: 11 MG/DL (ref 5–40)
WBC # BLD AUTO: 5.05 10*3/MM3 (ref 3.4–10.8)

## 2020-10-27 ENCOUNTER — OFFICE VISIT (OUTPATIENT)
Dept: FAMILY MEDICINE CLINIC | Facility: CLINIC | Age: 70
End: 2020-10-27

## 2020-10-27 VITALS
BODY MASS INDEX: 30.2 KG/M2 | DIASTOLIC BLOOD PRESSURE: 80 MMHG | HEIGHT: 72 IN | SYSTOLIC BLOOD PRESSURE: 120 MMHG | WEIGHT: 223 LBS | HEART RATE: 76 BPM | TEMPERATURE: 98 F | OXYGEN SATURATION: 98 %

## 2020-10-27 DIAGNOSIS — E66.09 EXOGENOUS OBESITY: ICD-10-CM

## 2020-10-27 DIAGNOSIS — E78.2 MIXED HYPERLIPIDEMIA: ICD-10-CM

## 2020-10-27 DIAGNOSIS — E11.9 TYPE 2 DIABETES MELLITUS WITHOUT COMPLICATION, WITHOUT LONG-TERM CURRENT USE OF INSULIN (HCC): Primary | ICD-10-CM

## 2020-10-27 DIAGNOSIS — E55.9 VITAMIN D DEFICIENCY: ICD-10-CM

## 2020-10-27 PROCEDURE — 99213 OFFICE O/P EST LOW 20 MIN: CPT | Performed by: FAMILY MEDICINE

## 2020-10-27 NOTE — PROGRESS NOTES
Subjective   Krishna Le is a 70 y.o. male with   Chief Complaint   Patient presents with   • Diabetes   .    History of Present Illness   70-year-old white male with type II non-insulin-dependent diabetes mellitus here for further medical management.  Fasting labs have been acquired prior to this visit.  Current medications include Metformin at 1000 mg twice daily as well as glimepiride at 2 mg daily.  Patient also has history of hyperlipidemia using Lipitor at 80 mg daily as well as fenofibrate at 160 mg daily.  He has admitted that he has watched his diet more strictly and is actually lost weight since July of this year.  He is walking more also as a form of exercise.  He is currently tolerating the above medication well and has no acute complaints.  The following portions of the patient's history were reviewed and updated as appropriate: allergies, current medications, past family history, past medical history, past social history, past surgical history and problem list.    Review of Systems   Cardiovascular:        Hyperlipidemia   Endocrine:        Type II non-insulin-dependent diabetes mellitus       Objective     Vitals:    10/27/20 0814   BP: 120/80   Pulse: 76   Temp: 98 °F (36.7 °C)   SpO2: 98%       Recent Results (from the past 672 hour(s))   PSA DIAGNOSTIC ONLY    Collection Time: 10/20/20  8:39 AM    Specimen: Blood   Result Value Ref Range    PSA 3.140 0.000 - 4.000 ng/mL   Vitamin D 25 hydroxy    Collection Time: 10/20/20  8:39 AM    Specimen: Blood   Result Value Ref Range    25 Hydroxy, Vitamin D 98.6 30.0 - 100.0 ng/ml   Comprehensive metabolic panel    Collection Time: 10/20/20  8:39 AM    Specimen: Blood   Result Value Ref Range    Glucose 107 (H) 65 - 99 mg/dL    BUN 21 8 - 23 mg/dL    Creatinine 1.13 0.76 - 1.27 mg/dL    eGFR Non African Am 64 >60 mL/min/1.73    eGFR African Am 78 >60 mL/min/1.73    BUN/Creatinine Ratio 18.6 7.0 - 25.0    Sodium 140 136 - 145 mmol/L    Potassium 4.8 3.5  - 5.2 mmol/L    Chloride 104 98 - 107 mmol/L    Total CO2 27.4 22.0 - 29.0 mmol/L    Calcium 9.8 8.6 - 10.5 mg/dL    Total Protein 7.6 6.0 - 8.5 g/dL    Albumin 4.40 3.50 - 5.20 g/dL    Globulin 3.2 gm/dL    A/G Ratio 1.4 g/dL    Total Bilirubin 0.5 0.0 - 1.2 mg/dL    Alkaline Phosphatase 40 39 - 117 U/L    AST (SGOT) 30 1 - 40 U/L    ALT (SGPT) 34 1 - 41 U/L   Hemoglobin A1c    Collection Time: 10/20/20  8:39 AM    Specimen: Blood   Result Value Ref Range    Hemoglobin A1C 5.80 (H) 4.80 - 5.60 %   Lipid panel    Collection Time: 10/20/20  8:39 AM    Specimen: Blood   Result Value Ref Range    Total Cholesterol 170 0 - 200 mg/dL    Triglycerides 55 0 - 150 mg/dL    HDL Cholesterol 57 40 - 60 mg/dL    VLDL Cholesterol Uzair 11 5 - 40 mg/dL    LDL Chol Calc (NIH) 102 (H) 0 - 100 mg/dL   CBC w AUTO Differential    Collection Time: 10/20/20  8:39 AM    Specimen: Blood   Result Value Ref Range    WBC 5.05 3.40 - 10.80 10*3/mm3    RBC 4.79 4.14 - 5.80 10*6/mm3    Hemoglobin 14.5 13.0 - 17.7 g/dL    Hematocrit 42.9 37.5 - 51.0 %    MCV 89.6 79.0 - 97.0 fL    MCH 30.3 26.6 - 33.0 pg    MCHC 33.8 31.5 - 35.7 g/dL    RDW 13.4 12.3 - 15.4 %    Platelets 307 140 - 450 10*3/mm3    Neutrophil Rel % 55.6 42.7 - 76.0 %    Lymphocyte Rel % 31.3 19.6 - 45.3 %    Monocyte Rel % 9.3 5.0 - 12.0 %    Eosinophil Rel % 2.2 0.3 - 6.2 %    Basophil Rel % 1.4 0.0 - 1.5 %    Neutrophils Absolute 2.81 1.70 - 7.00 10*3/mm3    Lymphocytes Absolute 1.58 0.70 - 3.10 10*3/mm3    Monocytes Absolute 0.47 0.10 - 0.90 10*3/mm3    Eosinophils Absolute 0.11 0.00 - 0.40 10*3/mm3    Basophils Absolute 0.07 0.00 - 0.20 10*3/mm3    Immature Granulocyte Rel % 0.2 0.0 - 0.5 %    Immature Grans Absolute 0.01 0.00 - 0.05 10*3/mm3    nRBC 0.0 0.0 - 0.2 /100 WBC       Physical Exam  Vitals signs and nursing note reviewed.   Constitutional:       Appearance: He is well-developed.   HENT:      Head: Normocephalic and atraumatic.   Neck:      Musculoskeletal: Neck  supple.      Thyroid: No thyroid mass or thyromegaly.      Vascular: Normal carotid pulses. No carotid bruit.      Trachea: Trachea and phonation normal.   Cardiovascular:      Rate and Rhythm: Normal rate and regular rhythm.      Heart sounds: Normal heart sounds. No murmur. No friction rub. No gallop.    Pulmonary:      Effort: Pulmonary effort is normal. No respiratory distress.      Breath sounds: Normal breath sounds. No decreased breath sounds, wheezing, rhonchi or rales.   Lymphadenopathy:      Cervical: No cervical adenopathy.   Skin:     General: Skin is warm and dry.      Findings: No rash.   Neurological:      Mental Status: He is alert and oriented to person, place, and time.   Psychiatric:         Speech: Speech normal.         Behavior: Behavior normal.         Thought Content: Thought content normal.         Judgment: Judgment normal.         Assessment/Plan   Diagnoses and all orders for this visit:    1. Type 2 diabetes mellitus without complication, without long-term current use of insulin (CMS/[facility]) (Primary)  -     CBC w AUTO Differential; Future  -     Lipid panel; Future  -     Hemoglobin A1c; Future  -     Comprehensive metabolic panel; Future  -     Vitamin D 25 hydroxy; Future  -     TSH; Future    2. Mixed hyperlipidemia  -     CBC w AUTO Differential; Future  -     Lipid panel; Future  -     Hemoglobin A1c; Future  -     Comprehensive metabolic panel; Future  -     Vitamin D 25 hydroxy; Future  -     TSH; Future    3. Vitamin D deficiency  -     CBC w AUTO Differential; Future  -     Lipid panel; Future  -     Hemoglobin A1c; Future  -     Comprehensive metabolic panel; Future  -     Vitamin D 25 hydroxy; Future  -     TSH; Future    4. Exogenous obesity  -     CBC w AUTO Differential; Future  -     Lipid panel; Future  -     Hemoglobin A1c; Future  -     Comprehensive metabolic panel; Future  -     Vitamin D 25 hydroxy; Future  -     TSH; Future    Patient must lower cholesterol in his  diet with an LDL goal of 70 or less.  Other current medications will be continued without alteration.  Fasting labs will take place in mid April with follow-up in this office thereafter.    Return in about 6 months (around 4/27/2021) for Recheck.

## 2021-01-10 DIAGNOSIS — E78.2 MIXED HYPERLIPIDEMIA: ICD-10-CM

## 2021-01-11 RX ORDER — ATORVASTATIN CALCIUM 80 MG/1
TABLET, FILM COATED ORAL
Qty: 90 TABLET | Refills: 0 | Status: SHIPPED | OUTPATIENT
Start: 2021-01-11 | End: 2021-04-08

## 2021-03-09 DIAGNOSIS — E11.9 TYPE 2 DIABETES MELLITUS WITHOUT COMPLICATION, WITHOUT LONG-TERM CURRENT USE OF INSULIN (HCC): ICD-10-CM

## 2021-03-15 ENCOUNTER — TRANSCRIBE ORDERS (OUTPATIENT)
Dept: ADMINISTRATIVE | Facility: HOSPITAL | Age: 71
End: 2021-03-15

## 2021-03-15 DIAGNOSIS — M54.12 CERVICAL RADICULOPATHY: Primary | ICD-10-CM

## 2021-03-16 ENCOUNTER — HOSPITAL ENCOUNTER (OUTPATIENT)
Dept: MRI IMAGING | Facility: HOSPITAL | Age: 71
Discharge: HOME | End: 2021-03-16
Admitting: ORTHOPAEDIC SURGERY

## 2021-03-16 DIAGNOSIS — M54.12 CERVICAL RADICULOPATHY: ICD-10-CM

## 2021-03-16 PROCEDURE — 72141 MRI NECK SPINE W/O DYE: CPT

## 2021-03-24 DIAGNOSIS — E11.9 TYPE 2 DIABETES MELLITUS WITHOUT COMPLICATION, WITHOUT LONG-TERM CURRENT USE OF INSULIN (HCC): ICD-10-CM

## 2021-03-24 RX ORDER — GLIMEPIRIDE 2 MG/1
TABLET ORAL
Qty: 30 TABLET | Refills: 0 | Status: SHIPPED | OUTPATIENT
Start: 2021-03-24 | End: 2021-04-20

## 2021-03-30 ENCOUNTER — TRANSCRIBE ORDERS (OUTPATIENT)
Dept: ADMINISTRATIVE | Facility: HOSPITAL | Age: 71
End: 2021-03-30

## 2021-03-30 DIAGNOSIS — R07.9 CHEST PAIN, UNSPECIFIED TYPE: Primary | ICD-10-CM

## 2021-04-08 DIAGNOSIS — E78.2 MIXED HYPERLIPIDEMIA: ICD-10-CM

## 2021-04-08 DIAGNOSIS — E11.9 TYPE 2 DIABETES MELLITUS WITHOUT COMPLICATION, WITHOUT LONG-TERM CURRENT USE OF INSULIN (HCC): ICD-10-CM

## 2021-04-08 DIAGNOSIS — E55.9 VITAMIN D DEFICIENCY: ICD-10-CM

## 2021-04-08 DIAGNOSIS — E66.09 EXOGENOUS OBESITY: ICD-10-CM

## 2021-04-08 RX ORDER — ATORVASTATIN CALCIUM 80 MG/1
TABLET, FILM COATED ORAL
Qty: 90 TABLET | Refills: 0 | Status: SHIPPED | OUTPATIENT
Start: 2021-04-08 | End: 2021-07-07

## 2021-04-13 ENCOUNTER — TELEPHONE (OUTPATIENT)
Dept: FAMILY MEDICINE CLINIC | Facility: CLINIC | Age: 71
End: 2021-04-13

## 2021-04-13 NOTE — TELEPHONE ENCOUNTER
REHAN CUENCA/ DR HARMON OFFICE IS REQUESTING A CALL BACK TO COLLABORATE ON THE PATIENT'S  SCHEDULED APPOINTMENTS AND LABS WITH DR SOUTH WITH [facility] IF POSSIBLE.    REHAN CAN BE REACHED AT  [phone]

## 2021-04-20 DIAGNOSIS — E11.9 TYPE 2 DIABETES MELLITUS WITHOUT COMPLICATION, WITHOUT LONG-TERM CURRENT USE OF INSULIN (HCC): ICD-10-CM

## 2021-04-20 RX ORDER — GLIMEPIRIDE 2 MG/1
TABLET ORAL
Qty: 30 TABLET | Refills: 0 | Status: SHIPPED | OUTPATIENT
Start: 2021-04-20 | End: 2021-04-28 | Stop reason: SDUPTHER

## 2021-04-22 DIAGNOSIS — E78.2 MIXED HYPERLIPIDEMIA: ICD-10-CM

## 2021-04-22 RX ORDER — FENOFIBRATE 160 MG/1
TABLET ORAL
Qty: 90 TABLET | Refills: 0 | Status: SHIPPED | OUTPATIENT
Start: 2021-04-22 | End: 2021-07-19

## 2021-04-26 ENCOUNTER — LAB (OUTPATIENT)
Dept: LAB | Facility: HOSPITAL | Age: 71
End: 2021-04-26

## 2021-04-26 ENCOUNTER — TRANSCRIBE ORDERS (OUTPATIENT)
Dept: ADMINISTRATIVE | Facility: HOSPITAL | Age: 71
End: 2021-04-26

## 2021-04-26 ENCOUNTER — HOSPITAL ENCOUNTER (OUTPATIENT)
Dept: GENERAL RADIOLOGY | Facility: HOSPITAL | Age: 71
Discharge: HOME | End: 2021-04-26

## 2021-04-26 ENCOUNTER — HOSPITAL ENCOUNTER (OUTPATIENT)
Dept: CARDIOLOGY | Facility: HOSPITAL | Age: 71
Setting detail: HOSPITAL OUTPATIENT SURGERY
Discharge: HOME | End: 2021-04-26

## 2021-04-26 DIAGNOSIS — Z01.818 PREOP TESTING: Primary | ICD-10-CM

## 2021-04-26 DIAGNOSIS — Z01.818 PREOP EXAMINATION: ICD-10-CM

## 2021-04-26 DIAGNOSIS — Z01.818 PREOP TESTING: ICD-10-CM

## 2021-04-26 DIAGNOSIS — M50.00 HNP (HERNIATED NUCLEUS PULPOSUS) WITH MYELOPATHY, CERVICAL: ICD-10-CM

## 2021-04-26 DIAGNOSIS — M50.00 HNP (HERNIATED NUCLEUS PULPOSUS) WITH MYELOPATHY, CERVICAL: Primary | ICD-10-CM

## 2021-04-26 LAB
25(OH)D3 SERPL-MCNC: 95.4 NG/ML (ref 30–100)
ALBUMIN SERPL-MCNC: 4.4 G/DL (ref 3.5–5.2)
ALBUMIN/GLOB SERPL: 1.2 G/DL
ALP SERPL-CCNC: 39 U/L (ref 39–117)
ALT SERPL W P-5'-P-CCNC: 29 U/L (ref 1–41)
ANION GAP SERPL CALCULATED.3IONS-SCNC: 8.4 MMOL/L (ref 5–15)
APTT PPP: 30.9 SECONDS (ref 24.3–38.1)
AST SERPL-CCNC: 23 U/L (ref 1–40)
BASOPHILS # BLD AUTO: 0.09 10*3/MM3 (ref 0–0.2)
BASOPHILS NFR BLD AUTO: 1.5 % (ref 0–1.5)
BILIRUB SERPL-MCNC: 0.4 MG/DL (ref 0–1.2)
BILIRUB UR QL STRIP: NEGATIVE
BUN SERPL-MCNC: 19 MG/DL (ref 8–23)
BUN/CREAT SERPL: 19.2 (ref 7–25)
CALCIUM SPEC-SCNC: 9.7 MG/DL (ref 8.6–10.5)
CHLORIDE SERPL-SCNC: 105 MMOL/L (ref 98–107)
CHOLEST SERPL-MCNC: 154 MG/DL (ref 0–200)
CLARITY UR: CLEAR
CO2 SERPL-SCNC: 24.6 MMOL/L (ref 22–29)
COLOR UR: YELLOW
CREAT SERPL-MCNC: 0.99 MG/DL (ref 0.76–1.27)
DEPRECATED RDW RBC AUTO: 45.3 FL (ref 37–54)
EOSINOPHIL # BLD AUTO: 0.17 10*3/MM3 (ref 0–0.4)
EOSINOPHIL NFR BLD AUTO: 2.8 % (ref 0.3–6.2)
ERYTHROCYTE [DISTWIDTH] IN BLOOD BY AUTOMATED COUNT: 13.3 % (ref 12.3–15.4)
GFR SERPL CREATININE-BSD FRML MDRD: 75 ML/MIN/1.73
GLOBULIN UR ELPH-MCNC: 3.8 GM/DL
GLUCOSE SERPL-MCNC: 110 MG/DL (ref 65–99)
GLUCOSE UR STRIP-MCNC: NEGATIVE MG/DL
HBA1C MFR BLD: 5.3 % (ref 4.8–5.6)
HCT VFR BLD AUTO: 44 % (ref 37.5–51)
HDLC SERPL-MCNC: 52 MG/DL (ref 40–60)
HGB BLD-MCNC: 14.3 G/DL (ref 13–17.7)
HGB UR QL STRIP.AUTO: NEGATIVE
IMM GRANULOCYTES # BLD AUTO: 0.01 10*3/MM3 (ref 0–0.05)
IMM GRANULOCYTES NFR BLD AUTO: 0.2 % (ref 0–0.5)
INR PPP: 1.07 (ref 0.9–1.1)
KETONES UR QL STRIP: NEGATIVE
LDLC SERPL CALC-MCNC: 87 MG/DL (ref 0–100)
LDLC/HDLC SERPL: 1.65 {RATIO}
LEUKOCYTE ESTERASE UR QL STRIP.AUTO: NEGATIVE
LYMPHOCYTES # BLD AUTO: 2.12 10*3/MM3 (ref 0.7–3.1)
LYMPHOCYTES NFR BLD AUTO: 35.4 % (ref 19.6–45.3)
MCH RBC QN AUTO: 30.2 PG (ref 26.6–33)
MCHC RBC AUTO-ENTMCNC: 32.5 G/DL (ref 31.5–35.7)
MCV RBC AUTO: 92.8 FL (ref 79–97)
MONOCYTES # BLD AUTO: 0.54 10*3/MM3 (ref 0.1–0.9)
MONOCYTES NFR BLD AUTO: 9 % (ref 5–12)
NEUTROPHILS NFR BLD AUTO: 3.06 10*3/MM3 (ref 1.7–7)
NEUTROPHILS NFR BLD AUTO: 51.1 % (ref 42.7–76)
NITRITE UR QL STRIP: NEGATIVE
NRBC BLD AUTO-RTO: 0 /100 WBC (ref 0–0.2)
PH UR STRIP.AUTO: <=5 [PH] (ref 4.5–8)
PLATELET # BLD AUTO: 263 10*3/MM3 (ref 140–450)
PMV BLD AUTO: 10.2 FL (ref 6–12)
POTASSIUM SERPL-SCNC: 4.2 MMOL/L (ref 3.5–5.2)
PROT SERPL-MCNC: 8.2 G/DL (ref 6–8.5)
PROT UR QL STRIP: NEGATIVE
PROTHROMBIN TIME: 13.6 SECONDS (ref 12.1–15)
QT INTERVAL: 397 MS
RBC # BLD AUTO: 4.74 10*6/MM3 (ref 4.14–5.8)
SODIUM SERPL-SCNC: 138 MMOL/L (ref 136–145)
SP GR UR STRIP: 1.02 (ref 1–1.03)
TRIGL SERPL-MCNC: 80 MG/DL (ref 0–150)
TSH SERPL DL<=0.05 MIU/L-ACNC: 4.94 UIU/ML (ref 0.27–4.2)
UROBILINOGEN UR QL STRIP: NORMAL
VLDLC SERPL-MCNC: 15 MG/DL (ref 5–40)
WBC # BLD AUTO: 5.99 10*3/MM3 (ref 3.4–10.8)

## 2021-04-26 PROCEDURE — 80061 LIPID PANEL: CPT

## 2021-04-26 PROCEDURE — 84443 ASSAY THYROID STIM HORMONE: CPT

## 2021-04-26 PROCEDURE — 36415 COLL VENOUS BLD VENIPUNCTURE: CPT

## 2021-04-26 PROCEDURE — 80053 COMPREHEN METABOLIC PANEL: CPT

## 2021-04-26 PROCEDURE — 71046 X-RAY EXAM CHEST 2 VIEWS: CPT

## 2021-04-26 PROCEDURE — 85730 THROMBOPLASTIN TIME PARTIAL: CPT

## 2021-04-26 PROCEDURE — 93005 ELECTROCARDIOGRAM TRACING: CPT | Performed by: ORTHOPAEDIC SURGERY

## 2021-04-26 PROCEDURE — 81003 URINALYSIS AUTO W/O SCOPE: CPT

## 2021-04-26 PROCEDURE — 85025 COMPLETE CBC W/AUTO DIFF WBC: CPT

## 2021-04-26 PROCEDURE — 85610 PROTHROMBIN TIME: CPT

## 2021-04-26 PROCEDURE — 83036 HEMOGLOBIN GLYCOSYLATED A1C: CPT

## 2021-04-26 PROCEDURE — 82306 VITAMIN D 25 HYDROXY: CPT

## 2021-04-26 PROCEDURE — 93010 ELECTROCARDIOGRAM REPORT: CPT | Performed by: INTERNAL MEDICINE

## 2021-04-27 ENCOUNTER — OFFICE VISIT (OUTPATIENT)
Dept: FAMILY MEDICINE CLINIC | Facility: CLINIC | Age: 71
End: 2021-04-27

## 2021-04-27 VITALS
HEIGHT: 72 IN | BODY MASS INDEX: 30.28 KG/M2 | SYSTOLIC BLOOD PRESSURE: 126 MMHG | HEART RATE: 83 BPM | WEIGHT: 223.6 LBS | TEMPERATURE: 97.3 F | DIASTOLIC BLOOD PRESSURE: 78 MMHG | OXYGEN SATURATION: 95 %

## 2021-04-27 DIAGNOSIS — E03.9 ACQUIRED HYPOTHYROIDISM: ICD-10-CM

## 2021-04-27 DIAGNOSIS — E55.9 VITAMIN D DEFICIENCY: ICD-10-CM

## 2021-04-27 DIAGNOSIS — E66.09 EXOGENOUS OBESITY: ICD-10-CM

## 2021-04-27 DIAGNOSIS — Z00.00 MEDICARE ANNUAL WELLNESS VISIT, SUBSEQUENT: Primary | ICD-10-CM

## 2021-04-27 DIAGNOSIS — E11.9 TYPE 2 DIABETES MELLITUS WITHOUT COMPLICATION, WITHOUT LONG-TERM CURRENT USE OF INSULIN (HCC): ICD-10-CM

## 2021-04-27 DIAGNOSIS — E78.2 MIXED HYPERLIPIDEMIA: ICD-10-CM

## 2021-04-27 DIAGNOSIS — R35.1 NOCTURIA: ICD-10-CM

## 2021-04-27 PROCEDURE — 99213 OFFICE O/P EST LOW 20 MIN: CPT | Performed by: FAMILY MEDICINE

## 2021-04-27 PROCEDURE — G0439 PPPS, SUBSEQ VISIT: HCPCS | Performed by: FAMILY MEDICINE

## 2021-04-27 RX ORDER — MELOXICAM 15 MG/1
TABLET ORAL
COMMUNITY
Start: 2021-03-03 | End: 2021-04-28

## 2021-04-27 RX ORDER — GABAPENTIN 100 MG/1
CAPSULE ORAL
COMMUNITY
Start: 2021-04-09 | End: 2022-05-11

## 2021-04-28 PROBLEM — E03.9 ACQUIRED HYPOTHYROIDISM: Status: ACTIVE | Noted: 2021-04-28

## 2021-04-28 RX ORDER — GLIMEPIRIDE 2 MG/1
2 TABLET ORAL
Qty: 90 TABLET | Refills: 1 | Status: SHIPPED | OUTPATIENT
Start: 2021-04-28 | End: 2021-11-15

## 2021-04-28 NOTE — PROGRESS NOTES
The ABCs of the Annual Wellness Visit  Subsequent Medicare Wellness Visit    Chief Complaint   Patient presents with   • Medicare Wellness-subsequent       Subjective   History of Present Illness:  Krishna Le is a 70 y.o. male who presents for a Subsequent Medicare Wellness Visit.  70-year-old white male here for subsequent Medicare wellness visit as well as further medical management of several medical issues.  Patient is a type II non-insulin-dependent diabetic with vitamin D deficiency and hyperlipidemia.  He has longstanding exogenous obesity although has been trying to manage that as much as possible.  There is also history of erectile dysfunction.  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  He will need surgical clearance after today's exam.  He has a stress test planned for Friday and surgery will take place on Tuesday.  Surgery by Dr. Pennington and patient actually will stay the night at a surgery center in [facility].  Current medications include atorvastatin at 80 mg daily, fenofibrate at 160 mg daily as well as gabapentin at 100 mg 3 times daily.  Patient is also using glimepiride at 2 mg every morning, Metformin at 1000 mg twice daily and 81 mg aspirin daily.  All medications are well-tolerated and used appropriately.  Fasting labs have been acquired prior to this visit.  HEALTH RISK ASSESSMENT    Recent Hospitalizations:  No hospitalization(s) within the last year.    Current Medical Providers:  Patient Care Team:  Papo Anand DO as PCP - General (Family Medicine)    Smoking Status:  Social History     Tobacco Use   Smoking Status Never Smoker   Smokeless Tobacco Never Used       Alcohol Consumption:  Social History     Substance and Sexual Activity   Alcohol Use Yes   • Alcohol/week: 2.0 standard drinks   • Types: 2 Glasses of wine per week    Comment: I PER DAY       Depression Screen:    PHQ-2/PHQ-9 Depression Screening 4/27/2021   Little interest or pleasure in doing things 0   Feeling down, depressed, or hopeless 0   Trouble falling or staying asleep, or sleeping too much -   Feeling tired or having little energy -   Poor appetite or overeating -   Feeling bad about yourself - or that you are a failure or have let yourself or your family down -   Trouble concentrating on things, such as reading the newspaper or watching television -   Moving or speaking so slowly that other people could have noticed. Or the opposite - being so fidgety or restless that you have been moving around a lot more than usual -   Thoughts that you would be better off dead, or of hurting yourself in some way -   Total Score 0       Fall Risk Screen:  CHARLA Fall Risk Assessment was completed, and patient is at LOW risk for falls.Assessment completed on:4/27/2021    Health Habits and Functional and Cognitive Screening:  Functional & Cognitive Status 4/27/2021   Do you have difficulty preparing food and eating? No   Do you have difficulty bathing yourself, getting dressed or grooming yourself? No   Do you have difficulty using the toilet? No   Do you have difficulty moving around from place to place? No   Do you have trouble with steps or getting out of a bed or a chair? No   Current Diet -   Dental Exam -   Eye Exam -   Exercise (times per week) -   Current Exercise Activities Include -   Do you need help using the phone?  No   Are you deaf or do you have serious difficulty hearing?  Yes   Do you need help with transportation? No   Do you need help shopping? No   Do you need help preparing meals?  No   Do you need help with housework?  No   Do you need help with laundry? No   Do you need help taking your medications? No   Do you need help managing money? No   Do you ever drive or ride in a car without wearing a seat belt? No   Have you felt unusual stress, anger or loneliness in the last month? -   Who do you live with? -    If you need help, do you have trouble finding someone available to you? -   Do you have difficulty concentrating, remembering or making decisions? -         Does the patient have evidence of cognitive impairment? No    Asprin use counseling:Taking ASA appropriately as indicated    Age-appropriate Screening Schedule:  Refer to the list below for future screening recommendations based on patient's age, sex and/or medical conditions. Orders for these recommended tests are listed in the plan section. The patient has been provided with a written plan.    Health Maintenance   Topic Date Due   • DIABETIC FOOT EXAM  10/23/2019   • URINE MICROALBUMIN  06/10/2021   • INFLUENZA VACCINE  08/01/2021   • HEMOGLOBIN A1C  10/26/2021   • DIABETIC EYE EXAM  12/15/2021   • LIPID PANEL  04/26/2022   • COLONOSCOPY  08/16/2024   • TDAP/TD VACCINES (3 - Td) 11/08/2029   • ZOSTER VACCINE  Discontinued          The following portions of the patient's history were reviewed and updated as appropriate: allergies, current medications, past family history, past medical history, past social history, past surgical history and problem list.    Outpatient Medications Prior to Visit   Medication Sig Dispense Refill   • atorvastatin (LIPITOR) 80 MG tablet TAKE ONE TABLET BY MOUTH DAILY 90 tablet 0   • betamethasone dipropionate (DIPROLENE) 0.05 % ointment Apply  topically 2 (Two) Times a Day.     • Cetirizine HCl (ALLERGY RELIEF) 10 MG capsule Take  by mouth.     • Cholecalciferol (VITAMIN D3) 125 MCG (5000 UT) capsule capsule Take 10,000 Units by mouth Daily.     • fenofibrate 160 MG tablet TAKE ONE TABLET BY MOUTH DAILY 90 tablet 0   • gabapentin (NEURONTIN) 100 MG capsule      • glucose blood test strip Use as instructed to check blood sugar once daily 100 each 4   • glucose monitor monitoring kit Use to check blood sugar once daily 1 each 0   • Lancets Misc. kit Use stylus and lancet to check blood sugar once daily 100 each 4   • metFORMIN  "(GLUCOPHAGE) 1000 MG tablet TAKE ONE TABLET BY MOUTH TWICE A DAY WITH MEALS 180 tablet 0   • Nutritional Supplements (GLUCOSAMINE FORTE) capsule Take  by mouth.     • Sodium Fluoride 5000 Sensitive 1.1-5 % paste      • vitamin C (ASCORBIC ACID) 500 MG tablet Take 500 mg by mouth Daily.     • Dupilumab, Asthma, (DUPIXENT SC) Inject  under the skin into the appropriate area as directed.     • glimepiride (AMARYL) 2 MG tablet TAKE ONE TABLET BY MOUTH EVERY MORNING BEFORE BREAKFAST 30 tablet 0   • UNKNOWN TO PATIENT OTC allergy nasal spray     • aspirin 81 MG chewable tablet Chew.     • meloxicam (MOBIC) 15 MG tablet      • tacrolimus (PROTOPIC) 0.1 % ointment        No facility-administered medications prior to visit.       Patient Active Problem List   Diagnosis   • Mixed hyperlipidemia   • Erectile dysfunction   • Vitamin D deficiency   • Exogenous obesity   • Adenomatous polyp of colon   • Type 2 diabetes mellitus without complication, without long-term current use of insulin (CMS/[facility])   • Acquired hypothyroidism       Advanced Care Planning:  ACP discussion was held with the patient during this visit. Patient does not have an advance directive, information provided.    Review of Systems   Cardiovascular:        Hyperlipidemia   Endocrine:        Type II non-insulin-dependent diabetes mellitus   Musculoskeletal: Positive for arthralgias, neck pain and neck stiffness.   All other systems reviewed and are negative.      Compared to one year ago, the patient feels his physical health is worse.  Compared to one year ago, the patient feels his mental health is the same.    Reviewed chart for potential of high risk medication in the elderly: not applicable  Reviewed chart for potential of harmful drug interactions in the elderly:not applicable    Objective         Vitals:    04/27/21 1033   BP: 126/78   Pulse: 83   Temp: 97.3 °F (36.3 °C)   SpO2: 95%   Weight: 101 kg (223 lb 9.6 oz)   Height: 182.9 cm (72.01\")       Body " mass index is 30.32 kg/m².  Discussed the patient's BMI with him. The BMI is above average; no BMI management plan is appropriate..    Physical Exam  Vitals and nursing note reviewed.   Constitutional:       Appearance: Normal appearance. He is well-developed and well-groomed. He is obese.      Comments: Exogenous obesity with a BMI of 30.3   HENT:      Head: Normocephalic and atraumatic.   Neck:      Thyroid: No thyroid mass or thyromegaly.      Vascular: Normal carotid pulses. No carotid bruit.      Trachea: Trachea and phonation normal.   Cardiovascular:      Rate and Rhythm: Normal rate and regular rhythm.      Pulses:           Posterior tibial pulses are 2+ on the right side and 2+ on the left side.      Heart sounds: Normal heart sounds. No murmur heard.   No friction rub. No gallop.    Pulmonary:      Effort: Pulmonary effort is normal. No respiratory distress.      Breath sounds: Normal breath sounds. No decreased breath sounds, wheezing, rhonchi or rales.   Abdominal:      General: Abdomen is flat. Bowel sounds are normal. There is no distension.      Palpations: Abdomen is soft. There is no hepatomegaly, splenomegaly or mass.      Tenderness: There is no abdominal tenderness. There is no right CVA tenderness, left CVA tenderness, guarding or rebound.      Hernia: No hernia is present.   Musculoskeletal:      Cervical back: Neck supple.   Lymphadenopathy:      Cervical: No cervical adenopathy.   Skin:     General: Skin is warm and dry.      Findings: No rash.   Neurological:      Mental Status: He is alert and oriented to person, place, and time.      Sensory: Sensation is intact.      Motor: Motor function is intact.      Gait: Gait is intact.      Deep Tendon Reflexes: Reflexes are normal and symmetric.   Psychiatric:         Attention and Perception: Attention and perception normal.         Mood and Affect: Mood and affect normal.         Speech: Speech normal.         Behavior: Behavior normal.  Behavior is cooperative.         Thought Content: Thought content normal.         Cognition and Memory: Cognition and memory normal.         Judgment: Judgment normal.       Hospital Outpatient Visit on 04/26/2021   Component Date Value Ref Range Status   • QT Interval 04/26/2021 397  ms Final   Lab on 04/26/2021   Component Date Value Ref Range Status   • 25 Hydroxy, Vitamin D 04/26/2021 95.4  30.0 - 100.0 ng/ml Final   Lab on 04/26/2021   Component Date Value Ref Range Status   • Hemoglobin A1C 04/26/2021 5.30  4.80 - 5.60 % Final   • Protime 04/26/2021 13.6  12.1 - 15.0 Seconds Final   • INR 04/26/2021 1.07  0.90 - 1.10 Final   • PTT 04/26/2021 30.9  24.3 - 38.1 seconds Final   • Glucose 04/26/2021 110* 65 - 99 mg/dL Final   • BUN 04/26/2021 19  8 - 23 mg/dL Final   • Creatinine 04/26/2021 0.99  0.76 - 1.27 mg/dL Final   • Sodium 04/26/2021 138  136 - 145 mmol/L Final   • Potassium 04/26/2021 4.2  3.5 - 5.2 mmol/L Final   • Chloride 04/26/2021 105  98 - 107 mmol/L Final   • CO2 04/26/2021 24.6  22.0 - 29.0 mmol/L Final   • Calcium 04/26/2021 9.7  8.6 - 10.5 mg/dL Final   • Total Protein 04/26/2021 8.2  6.0 - 8.5 g/dL Final   • Albumin 04/26/2021 4.40  3.50 - 5.20 g/dL Final   • ALT (SGPT) 04/26/2021 29  1 - 41 U/L Final   • AST (SGOT) 04/26/2021 23  1 - 40 U/L Final   • Alkaline Phosphatase 04/26/2021 39  39 - 117 U/L Final   • Total Bilirubin 04/26/2021 0.4  0.0 - 1.2 mg/dL Final   • eGFR Non African Amer 04/26/2021 75  >60 mL/min/1.73 Final   • Globulin 04/26/2021 3.8  gm/dL Final   • A/G Ratio 04/26/2021 1.2  g/dL Final   • BUN/Creatinine Ratio 04/26/2021 19.2  7.0 - 25.0 Final   • Anion Gap 04/26/2021 8.4  5.0 - 15.0 mmol/L Final   • Total Cholesterol 04/26/2021 154  0 - 200 mg/dL Final   • Triglycerides 04/26/2021 80  0 - 150 mg/dL Final   • HDL Cholesterol 04/26/2021 52  40 - 60 mg/dL Final   • LDL Cholesterol  04/26/2021 87  0 - 100 mg/dL Final   • VLDL Cholesterol 04/26/2021 15  5 - 40 mg/dL Final   •  LDL/HDL Ratio 04/26/2021 1.65   Final   • TSH 04/26/2021 4.940* 0.270 - 4.200 uIU/mL Final   • Color, UA 04/26/2021 Yellow  Yellow, Straw Final   • Appearance, UA 04/26/2021 Clear  Clear Final   • pH, UA 04/26/2021 <=5.0  4.5 - 8.0 Final   • Specific Gravity, UA 04/26/2021 1.025  1.003 - 1.030 Final   • Glucose, UA 04/26/2021 Negative  Negative Final   • Ketones, UA 04/26/2021 Negative  Negative Final   • Bilirubin, UA 04/26/2021 Negative  Negative Final   • Blood, UA 04/26/2021 Negative  Negative Final   • Protein, UA 04/26/2021 Negative  Negative Final   • Leuk Esterase, UA 04/26/2021 Negative  Negative Final   • Nitrite, UA 04/26/2021 Negative  Negative Final   • Urobilinogen, UA 04/26/2021 0.2 E.U./dL  0.2 - 1.0 E.U./dL Final   • WBC 04/26/2021 5.99  3.40 - 10.80 10*3/mm3 Final   • RBC 04/26/2021 4.74  4.14 - 5.80 10*6/mm3 Final   • Hemoglobin 04/26/2021 14.3  13.0 - 17.7 g/dL Final   • Hematocrit 04/26/2021 44.0  37.5 - 51.0 % Final   • MCV 04/26/2021 92.8  79.0 - 97.0 fL Final   • MCH 04/26/2021 30.2  26.6 - 33.0 pg Final   • MCHC 04/26/2021 32.5  31.5 - 35.7 g/dL Final   • RDW 04/26/2021 13.3  12.3 - 15.4 % Final   • RDW-SD 04/26/2021 45.3  37.0 - 54.0 fl Final   • MPV 04/26/2021 10.2  6.0 - 12.0 fL Final   • Platelets 04/26/2021 263  140 - 450 10*3/mm3 Final   • Neutrophil % 04/26/2021 51.1  42.7 - 76.0 % Final   • Lymphocyte % 04/26/2021 35.4  19.6 - 45.3 % Final   • Monocyte % 04/26/2021 9.0  5.0 - 12.0 % Final   • Eosinophil % 04/26/2021 2.8  0.3 - 6.2 % Final   • Basophil % 04/26/2021 1.5  0.0 - 1.5 % Final   • Immature Grans % 04/26/2021 0.2  0.0 - 0.5 % Final   • Neutrophils, Absolute 04/26/2021 3.06  1.70 - 7.00 10*3/mm3 Final   • Lymphocytes, Absolute 04/26/2021 2.12  0.70 - 3.10 10*3/mm3 Final   • Monocytes, Absolute 04/26/2021 0.54  0.10 - 0.90 10*3/mm3 Final   • Eosinophils, Absolute 04/26/2021 0.17  0.00 - 0.40 10*3/mm3 Final   • Basophils, Absolute 04/26/2021 0.09  0.00 - 0.20 10*3/mm3 Final   •  Immature Grans, Absolute 04/26/2021 0.01  0.00 - 0.05 10*3/mm3 Final   • nRBC 04/26/2021 0.0  0.0 - 0.2 /100 WBC Final         Lab Results   Component Value Date    TRIG 80 04/26/2021    HDL 52 04/26/2021    LDL 87 04/26/2021    VLDL 15 04/26/2021    HGBA1C 5.30 04/26/2021        Assessment/Plan   Medicare Risks and Personalized Health Plan  CMS Preventative Services Quick Reference  Advance Directive Discussion  Cardiovascular risk  Chronic Pain   Colon Cancer Screening  Dementia/Memory   Depression/Dysphoria  Diabetic Lab Screening   Fall Risk  Hearing Problem  Immunizations Discussed/Encouraged (specific immunizations; Influenza )  Inactivity/Sedentary  Obesity/Overweight   Polypharmacy  Prostate Cancer Screening     The above risks/problems have been discussed with the patient.  Pertinent information has been shared with the patient in the After Visit Summary.  Follow up plans and orders are seen below in the Assessment/Plan Section.    Diagnoses and all orders for this visit:    1. Medicare annual wellness visit, subsequent (Primary)  -     Hemoglobin A1c; Future  -     Comprehensive metabolic panel; Future  -     TSH; Future  -     PSA DIAGNOSTIC ONLY; Future  -     Vitamin D 25 hydroxy; Future  -     Lipid panel; Future  -     CBC w AUTO Differential; Future    2. Type 2 diabetes mellitus without complication, without long-term current use of insulin (CMS/[facility])  -     glimepiride (AMARYL) 2 MG tablet; Take 1 tablet by mouth Every Morning Before Breakfast.  Dispense: 90 tablet; Refill: 1  -     Hemoglobin A1c; Future  -     Comprehensive metabolic panel; Future  -     TSH; Future  -     PSA DIAGNOSTIC ONLY; Future  -     Vitamin D 25 hydroxy; Future  -     Lipid panel; Future  -     CBC w AUTO Differential; Future    3. Exogenous obesity  -     Hemoglobin A1c; Future  -     Comprehensive metabolic panel; Future  -     TSH; Future  -     PSA DIAGNOSTIC ONLY; Future  -     Vitamin D 25 hydroxy; Future  -      Lipid panel; Future  -     CBC w AUTO Differential; Future    4. Mixed hyperlipidemia  -     Hemoglobin A1c; Future  -     Comprehensive metabolic panel; Future  -     TSH; Future  -     PSA DIAGNOSTIC ONLY; Future  -     Vitamin D 25 hydroxy; Future  -     Lipid panel; Future  -     CBC w AUTO Differential; Future    5. Vitamin D deficiency  -     Hemoglobin A1c; Future  -     Comprehensive metabolic panel; Future  -     TSH; Future  -     PSA DIAGNOSTIC ONLY; Future  -     Vitamin D 25 hydroxy; Future  -     Lipid panel; Future  -     CBC w AUTO Differential; Future    6. Acquired hypothyroidism  -     Hemoglobin A1c; Future  -     Comprehensive metabolic panel; Future  -     TSH; Future  -     PSA DIAGNOSTIC ONLY; Future  -     Vitamin D 25 hydroxy; Future  -     Lipid panel; Future  -     CBC w AUTO Differential; Future    7. Nocturia  -     PSA DIAGNOSTIC ONLY; Future    Pending stress test patient is cleared for surgery  Follow Up:  Return in about 6 months (around 10/27/2021) for Recheck.     An After Visit Summary and PPPS were given to the patient.

## 2021-04-30 ENCOUNTER — HOSPITAL ENCOUNTER (OUTPATIENT)
Dept: NUCLEAR MEDICINE | Facility: HOSPITAL | Age: 71
Discharge: HOME | End: 2021-04-30

## 2021-04-30 ENCOUNTER — HOSPITAL ENCOUNTER (OUTPATIENT)
Dept: CARDIOLOGY | Facility: HOSPITAL | Age: 71
Discharge: HOME | End: 2021-04-30

## 2021-04-30 DIAGNOSIS — R07.9 CHEST PAIN, UNSPECIFIED TYPE: ICD-10-CM

## 2021-04-30 LAB
BH CV NUCLEAR PRIOR STUDY: 3
BH CV REST NUCLEAR ISOTOPE DOSE: 11.6 MCI
BH CV STRESS BP STAGE 1: NORMAL
BH CV STRESS COMMENTS STAGE 1: NORMAL
BH CV STRESS DOSE REGADENOSON STAGE 1: 0.4
BH CV STRESS DURATION MIN STAGE 1: 3
BH CV STRESS DURATION SEC STAGE 1: 0
BH CV STRESS GRADE STAGE 1: 10
BH CV STRESS HR STAGE 1: 70
BH CV STRESS METS STAGE 1: 5
BH CV STRESS NUCLEAR ISOTOPE DOSE: 33.6 MCI
BH CV STRESS O2 STAGE 1: 98
BH CV STRESS PROTOCOL 1: NORMAL
BH CV STRESS RECOVERY BP: NORMAL MMHG
BH CV STRESS RECOVERY HR: 94 BPM
BH CV STRESS RECOVERY O2: 98 %
BH CV STRESS SPEED STAGE 1: 1.7
BH CV STRESS STAGE 1: 1
LV EF NUC BP: 70 %
MAXIMAL PREDICTED HEART RATE: 149 BPM
PERCENT MAX PREDICTED HR: 65.77 %
STRESS BASELINE BP: NORMAL MMHG
STRESS BASELINE HR: 69 BPM
STRESS O2 SAT REST: 97 %
STRESS PERCENT HR: 77 %
STRESS POST ESTIMATED WORKLOAD: 1 METS
STRESS POST EXERCISE DUR SEC: 30 SEC
STRESS POST O2 SAT PEAK: 100 %
STRESS POST PEAK BP: NORMAL MMHG
STRESS POST PEAK HR: 98 BPM
STRESS TARGET HR: 127 BPM

## 2021-04-30 PROCEDURE — A9500 TC99M SESTAMIBI: HCPCS | Performed by: ORTHOPAEDIC SURGERY

## 2021-04-30 PROCEDURE — 25010000002 REGADENOSON 0.4 MG/5ML SOLUTION: Performed by: ORTHOPAEDIC SURGERY

## 2021-04-30 PROCEDURE — 0 TECHNETIUM SESTAMIBI: Performed by: ORTHOPAEDIC SURGERY

## 2021-04-30 PROCEDURE — 93017 CV STRESS TEST TRACING ONLY: CPT

## 2021-04-30 PROCEDURE — 93018 CV STRESS TEST I&R ONLY: CPT | Performed by: INTERNAL MEDICINE

## 2021-04-30 PROCEDURE — 78452 HT MUSCLE IMAGE SPECT MULT: CPT | Performed by: INTERNAL MEDICINE

## 2021-04-30 PROCEDURE — 93016 CV STRESS TEST SUPVJ ONLY: CPT | Performed by: INTERNAL MEDICINE

## 2021-04-30 PROCEDURE — 78452 HT MUSCLE IMAGE SPECT MULT: CPT

## 2021-04-30 RX ADMIN — TECHNETIUM TC 99M SESTAMIBI 1 DOSE: 1 INJECTION INTRAVENOUS at 08:18

## 2021-04-30 RX ADMIN — REGADENOSON 0.4 MG: 0.08 INJECTION, SOLUTION INTRAVENOUS at 08:17

## 2021-04-30 RX ADMIN — TECHNETIUM TC 99M SESTAMIBI 1 DOSE: 1 INJECTION INTRAVENOUS at 06:54

## 2021-05-03 ENCOUNTER — TELEPHONE (OUTPATIENT)
Dept: FAMILY MEDICINE CLINIC | Facility: CLINIC | Age: 71
End: 2021-05-03

## 2021-05-03 NOTE — TELEPHONE ENCOUNTER
Patient needs surgery clearance and his stress test approved so he can have surgery tomorrow and Dr. Pardo's office needs to be notified when approved. Patient wants it done TODAY

## 2021-05-19 DIAGNOSIS — E11.9 TYPE 2 DIABETES MELLITUS WITHOUT COMPLICATION, WITHOUT LONG-TERM CURRENT USE OF INSULIN (HCC): ICD-10-CM

## 2021-05-19 RX ORDER — GLIMEPIRIDE 2 MG/1
TABLET ORAL
Qty: 30 TABLET | Refills: 0 | OUTPATIENT
Start: 2021-05-19

## 2021-06-05 DIAGNOSIS — E11.9 TYPE 2 DIABETES MELLITUS WITHOUT COMPLICATION, WITHOUT LONG-TERM CURRENT USE OF INSULIN (HCC): ICD-10-CM

## 2021-07-07 DIAGNOSIS — E78.2 MIXED HYPERLIPIDEMIA: ICD-10-CM

## 2021-07-07 RX ORDER — ATORVASTATIN CALCIUM 80 MG/1
TABLET, FILM COATED ORAL
Qty: 90 TABLET | Refills: 0 | Status: SHIPPED | OUTPATIENT
Start: 2021-07-07 | End: 2021-10-04

## 2021-07-18 DIAGNOSIS — E78.2 MIXED HYPERLIPIDEMIA: ICD-10-CM

## 2021-07-19 RX ORDER — FENOFIBRATE 160 MG/1
TABLET ORAL
Qty: 90 TABLET | Refills: 1 | Status: SHIPPED | OUTPATIENT
Start: 2021-07-19 | End: 2021-12-08 | Stop reason: SDUPTHER

## 2021-07-23 DIAGNOSIS — E78.2 MIXED HYPERLIPIDEMIA: ICD-10-CM

## 2021-07-23 RX ORDER — FENOFIBRATE 160 MG/1
TABLET ORAL
Qty: 90 TABLET | Refills: 0 | OUTPATIENT
Start: 2021-07-23

## 2021-10-03 DIAGNOSIS — E78.2 MIXED HYPERLIPIDEMIA: ICD-10-CM

## 2021-10-04 RX ORDER — ATORVASTATIN CALCIUM 80 MG/1
TABLET, FILM COATED ORAL
Qty: 90 TABLET | Refills: 0 | Status: SHIPPED | OUTPATIENT
Start: 2021-10-04 | End: 2021-12-08 | Stop reason: SDUPTHER

## 2021-10-20 DIAGNOSIS — E55.9 VITAMIN D DEFICIENCY: ICD-10-CM

## 2021-10-20 DIAGNOSIS — E78.2 MIXED HYPERLIPIDEMIA: ICD-10-CM

## 2021-10-20 DIAGNOSIS — E66.09 EXOGENOUS OBESITY: ICD-10-CM

## 2021-10-20 DIAGNOSIS — Z00.00 MEDICARE ANNUAL WELLNESS VISIT, SUBSEQUENT: ICD-10-CM

## 2021-10-20 DIAGNOSIS — E11.9 TYPE 2 DIABETES MELLITUS WITHOUT COMPLICATION, WITHOUT LONG-TERM CURRENT USE OF INSULIN (HCC): ICD-10-CM

## 2021-10-20 DIAGNOSIS — E03.9 ACQUIRED HYPOTHYROIDISM: ICD-10-CM

## 2021-10-20 DIAGNOSIS — R35.1 NOCTURIA: ICD-10-CM

## 2021-10-21 ENCOUNTER — LAB (OUTPATIENT)
Dept: FAMILY MEDICINE CLINIC | Facility: CLINIC | Age: 71
End: 2021-10-21

## 2021-10-22 LAB
25(OH)D3+25(OH)D2 SERPL-MCNC: 95 NG/ML (ref 30–100)
ALBUMIN SERPL-MCNC: 4.3 G/DL (ref 3.7–4.7)
ALBUMIN/GLOB SERPL: 1.2 {RATIO} (ref 1.2–2.2)
ALP SERPL-CCNC: 35 IU/L (ref 44–121)
ALT SERPL-CCNC: 34 IU/L (ref 0–44)
AST SERPL-CCNC: 28 IU/L (ref 0–40)
BASOPHILS # BLD AUTO: 0.1 X10E3/UL (ref 0–0.2)
BASOPHILS NFR BLD AUTO: 1 %
BILIRUB SERPL-MCNC: 0.4 MG/DL (ref 0–1.2)
BUN SERPL-MCNC: 21 MG/DL (ref 8–27)
BUN/CREAT SERPL: 18 (ref 10–24)
CALCIUM SERPL-MCNC: 9.8 MG/DL (ref 8.6–10.2)
CHLORIDE SERPL-SCNC: 105 MMOL/L (ref 96–106)
CHOLEST SERPL-MCNC: 155 MG/DL (ref 100–199)
CO2 SERPL-SCNC: 23 MMOL/L (ref 20–29)
CREAT SERPL-MCNC: 1.16 MG/DL (ref 0.76–1.27)
EOSINOPHIL # BLD AUTO: 0.1 X10E3/UL (ref 0–0.4)
EOSINOPHIL NFR BLD AUTO: 2 %
ERYTHROCYTE [DISTWIDTH] IN BLOOD BY AUTOMATED COUNT: 13.1 % (ref 11.6–15.4)
GLOBULIN SER CALC-MCNC: 3.6 G/DL (ref 1.5–4.5)
GLUCOSE SERPL-MCNC: 98 MG/DL (ref 65–99)
HBA1C MFR BLD: 5.7 % (ref 4.8–5.6)
HCT VFR BLD AUTO: 41.2 % (ref 37.5–51)
HDLC SERPL-MCNC: 54 MG/DL
HGB BLD-MCNC: 13.8 G/DL (ref 13–17.7)
IMM GRANULOCYTES # BLD AUTO: 0 X10E3/UL (ref 0–0.1)
IMM GRANULOCYTES NFR BLD AUTO: 0 %
LDLC SERPL CALC-MCNC: 90 MG/DL (ref 0–99)
LYMPHOCYTES # BLD AUTO: 1.3 X10E3/UL (ref 0.7–3.1)
LYMPHOCYTES NFR BLD AUTO: 29 %
MCH RBC QN AUTO: 30.7 PG (ref 26.6–33)
MCHC RBC AUTO-ENTMCNC: 33.5 G/DL (ref 31.5–35.7)
MCV RBC AUTO: 92 FL (ref 79–97)
MONOCYTES # BLD AUTO: 0.5 X10E3/UL (ref 0.1–0.9)
MONOCYTES NFR BLD AUTO: 11 %
NEUTROPHILS # BLD AUTO: 2.5 X10E3/UL (ref 1.4–7)
NEUTROPHILS NFR BLD AUTO: 57 %
PLATELET # BLD AUTO: 267 X10E3/UL (ref 150–450)
POTASSIUM SERPL-SCNC: 4.4 MMOL/L (ref 3.5–5.2)
PROT SERPL-MCNC: 7.9 G/DL (ref 6–8.5)
PSA SERPL-MCNC: 3.9 NG/ML (ref 0–4)
RBC # BLD AUTO: 4.49 X10E6/UL (ref 4.14–5.8)
SODIUM SERPL-SCNC: 141 MMOL/L (ref 134–144)
TRIGL SERPL-MCNC: 51 MG/DL (ref 0–149)
TSH SERPL DL<=0.005 MIU/L-ACNC: 3.14 UIU/ML (ref 0.45–4.5)
VLDLC SERPL CALC-MCNC: 11 MG/DL (ref 5–40)
WBC # BLD AUTO: 4.6 X10E3/UL (ref 3.4–10.8)

## 2021-11-13 DIAGNOSIS — E11.9 TYPE 2 DIABETES MELLITUS WITHOUT COMPLICATION, WITHOUT LONG-TERM CURRENT USE OF INSULIN (HCC): ICD-10-CM

## 2021-11-15 RX ORDER — GLIMEPIRIDE 2 MG/1
TABLET ORAL
Qty: 90 TABLET | Refills: 0 | Status: SHIPPED | OUTPATIENT
Start: 2021-11-15 | End: 2022-02-14

## 2021-12-02 DIAGNOSIS — E11.9 TYPE 2 DIABETES MELLITUS WITHOUT COMPLICATION, WITHOUT LONG-TERM CURRENT USE OF INSULIN (HCC): ICD-10-CM

## 2021-12-07 ENCOUNTER — OFFICE VISIT (OUTPATIENT)
Dept: FAMILY MEDICINE CLINIC | Facility: CLINIC | Age: 71
End: 2021-12-07

## 2021-12-07 VITALS
WEIGHT: 230 LBS | OXYGEN SATURATION: 98 % | TEMPERATURE: 97.5 F | HEART RATE: 88 BPM | DIASTOLIC BLOOD PRESSURE: 80 MMHG | SYSTOLIC BLOOD PRESSURE: 128 MMHG | BODY MASS INDEX: 31.15 KG/M2 | HEIGHT: 72 IN

## 2021-12-07 DIAGNOSIS — E11.9 TYPE 2 DIABETES MELLITUS WITHOUT COMPLICATION, WITHOUT LONG-TERM CURRENT USE OF INSULIN (HCC): Primary | ICD-10-CM

## 2021-12-07 DIAGNOSIS — E55.9 VITAMIN D DEFICIENCY: ICD-10-CM

## 2021-12-07 DIAGNOSIS — E78.2 MIXED HYPERLIPIDEMIA: ICD-10-CM

## 2021-12-07 DIAGNOSIS — E66.09 EXOGENOUS OBESITY: ICD-10-CM

## 2021-12-07 DIAGNOSIS — E03.9 ACQUIRED HYPOTHYROIDISM: ICD-10-CM

## 2021-12-07 PROCEDURE — 99214 OFFICE O/P EST MOD 30 MIN: CPT | Performed by: FAMILY MEDICINE

## 2021-12-08 RX ORDER — ATORVASTATIN CALCIUM 80 MG/1
80 TABLET, FILM COATED ORAL DAILY
Qty: 90 TABLET | Refills: 1 | Status: SHIPPED | OUTPATIENT
Start: 2021-12-08 | End: 2022-05-11 | Stop reason: SDUPTHER

## 2021-12-08 RX ORDER — FENOFIBRATE 160 MG/1
160 TABLET ORAL DAILY
Qty: 90 TABLET | Refills: 1 | Status: SHIPPED | OUTPATIENT
Start: 2021-12-08 | End: 2022-05-11 | Stop reason: SDUPTHER

## 2021-12-08 NOTE — PROGRESS NOTES
Subjective   Krishna Le is a 71 y.o. male with   Chief Complaint   Patient presents with   • Diabetes   • Hyperlipidemia   .    History of Present Illness   71-year-old white male with type II non-insulin-dependent diabetes mellitus as well as hyperlipidemia here for further medical management.  Current medications include Metformin at 1000 mg twice daily as well as glimepiride at 2 mg daily.  Patient also uses fenofibrate in combination with atorvastatin for hyperlipidemia.  He does use an 81 mg aspirin daily.  Fasting labs have been acquired prior to this visit.  All medications are used appropriately and are well-tolerated without side effects.  Patient sees Dr. Rosalio Weir of the urology service for his BPH.  He has had a recent PSA in that office which was much more reasonable than the current one obtained with our labs.    The following portions of the patient's history were reviewed and updated as appropriate: allergies, current medications, past family history, past medical history, past social history, past surgical history and problem list.    Review of Systems   Constitutional:        Obese   Cardiovascular:        Hyperlipidemia   Endocrine:        Type II non-insulin-dependent diabetes mellitus       Objective     Vitals:    12/07/21 1431   BP: 128/80   Pulse: 88   Temp: 97.5 °F (36.4 °C)   SpO2: 98%       No results found for this or any previous visit (from the past 672 hour(s)).    Physical Exam  Vitals and nursing note reviewed.   Constitutional:       Appearance: Normal appearance. He is well-developed and well-groomed.      Comments: Exogenous obesity with a BMI 31.2   HENT:      Head: Normocephalic and atraumatic.   Neck:      Thyroid: No thyroid mass or thyromegaly.      Vascular: Normal carotid pulses. No carotid bruit.      Trachea: Trachea and phonation normal.   Cardiovascular:      Rate and Rhythm: Normal rate and regular rhythm.      Heart sounds: Normal heart sounds. No murmur  heard.  No friction rub. No gallop.    Pulmonary:      Effort: Pulmonary effort is normal. No respiratory distress.      Breath sounds: Normal breath sounds. No decreased breath sounds, wheezing, rhonchi or rales.   Musculoskeletal:      Cervical back: Neck supple.   Lymphadenopathy:      Cervical: No cervical adenopathy.   Skin:     General: Skin is warm and dry.      Findings: No rash.   Neurological:      Mental Status: He is alert and oriented to person, place, and time.   Psychiatric:         Attention and Perception: Attention and perception normal.         Mood and Affect: Mood and affect normal.         Speech: Speech normal.         Behavior: Behavior normal. Behavior is cooperative.         Thought Content: Thought content normal.         Cognition and Memory: Cognition and memory normal.         Judgment: Judgment normal.       Orders Only on 10/20/2021   Component Date Value Ref Range Status   • WBC 10/21/2021 4.6  3.4 - 10.8 x10E3/uL Final   • RBC 10/21/2021 4.49  4.14 - 5.80 x10E6/uL Final   • Hemoglobin 10/21/2021 13.8  13.0 - 17.7 g/dL Final   • Hematocrit 10/21/2021 41.2  37.5 - 51.0 % Final   • MCV 10/21/2021 92  79 - 97 fL Final   • MCH 10/21/2021 30.7  26.6 - 33.0 pg Final   • MCHC 10/21/2021 33.5  31.5 - 35.7 g/dL Final   • RDW 10/21/2021 13.1  11.6 - 15.4 % Final   • Platelets 10/21/2021 267  150 - 450 x10E3/uL Final   • Neutrophil Rel % 10/21/2021 57  Not Estab. % Final   • Lymphocyte Rel % 10/21/2021 29  Not Estab. % Final   • Monocyte Rel % 10/21/2021 11  Not Estab. % Final   • Eosinophil Rel % 10/21/2021 2  Not Estab. % Final   • Basophil Rel % 10/21/2021 1  Not Estab. % Final   • Neutrophils Absolute 10/21/2021 2.5  1.4 - 7.0 x10E3/uL Final   • Lymphocytes Absolute 10/21/2021 1.3  0.7 - 3.1 x10E3/uL Final   • Monocytes Absolute 10/21/2021 0.5  0.1 - 0.9 x10E3/uL Final   • Eosinophils Absolute 10/21/2021 0.1  0.0 - 0.4 x10E3/uL Final   • Basophils Absolute 10/21/2021 0.1  0.0 - 0.2 x10E3/uL  Final   • Immature Granulocyte Rel % 10/21/2021 0  Not Estab. % Final   • Immature Grans Absolute 10/21/2021 0.0  0.0 - 0.1 x10E3/uL Final   • Total Cholesterol 10/21/2021 155  100 - 199 mg/dL Final   • Triglycerides 10/21/2021 51  0 - 149 mg/dL Final   • HDL Cholesterol 10/21/2021 54  >39 mg/dL Final   • VLDL Cholesterol Uzair 10/21/2021 11  5 - 40 mg/dL Final   • LDL Chol Calc ([facility]) 10/21/2021 90  0 - 99 mg/dL Final   • 25 Hydroxy, Vitamin D 10/21/2021 95.0  30.0 - 100.0 ng/mL Final    Comment: Vitamin D deficiency has been defined by the [place] [employer]  [employer] and an Endocrine Society practice guideline as a  level of serum 25-OH vitamin D less than 20 ng/mL (1,2).  The Endocrine Society went on to further define vitamin D  insufficiency as a level between 21 and 29 ng/mL (2).  1. IOM ([place] [employer]). 2010. Dietary reference     intakes for calcium and D. [place]: The     National Academies Press.  2. Miriam MF, Eran NC, Esequiel INIGUEZ, et al.     Evaluation, treatment, and prevention of vitamin D     deficiency: an Endocrine Society clinical practice     guideline. JCEM. 2011 Jul; 96(7):1911-30.     • PSA 10/21/2021 3.9  0.0 - 4.0 ng/mL Final    Comment: Roche ECLIA methodology.  According to the American Urological Association, Serum PSA should  decrease and remain at undetectable levels after radical  prostatectomy. The AUA defines biochemical recurrence as an initial  PSA value 0.2 ng/mL or greater followed by a subsequent confirmatory  PSA value 0.2 ng/mL or greater.  Values obtained with different assay methods or kits cannot be used  interchangeably. Results cannot be interpreted as absolute evidence  of the presence or absence of malignant disease.     • TSH 10/21/2021 3.140  0.450 - 4.500 uIU/mL Final   • Glucose 10/21/2021 98  65 - 99 mg/dL Final   • BUN 10/21/2021 21  8 - 27 mg/dL Final   • Creatinine 10/21/2021 1.16  0.76 - 1.27 mg/dL Final   • eGFR Non  Am 10/21/2021 63   >59 mL/min/1.73 Final   • eGFR African Am 10/21/2021 72  >59 mL/min/1.73 Final    Comment: **In accordance with recommendations from the NKF-ASN Task force,**    [facility] is in the process of updating its eGFR calculation to the    2021 CKD-EPI creatinine equation that estimates kidney function    without a race variable.     • BUN/Creatinine Ratio 10/21/2021 18  10 - 24 Final   • Sodium 10/21/2021 141  134 - 144 mmol/L Final   • Potassium 10/21/2021 4.4  3.5 - 5.2 mmol/L Final   • Chloride 10/21/2021 105  96 - 106 mmol/L Final   • Total CO2 10/21/2021 23  20 - 29 mmol/L Final   • Calcium 10/21/2021 9.8  8.6 - 10.2 mg/dL Final   • Total Protein 10/21/2021 7.9  6.0 - 8.5 g/dL Final   • Albumin 10/21/2021 4.3  3.7 - 4.7 g/dL Final   • Globulin 10/21/2021 3.6  1.5 - 4.5 g/dL Final   • A/G Ratio 10/21/2021 1.2  1.2 - 2.2 Final   • Total Bilirubin 10/21/2021 0.4  0.0 - 1.2 mg/dL Final   • Alkaline Phosphatase 10/21/2021 35* 44 - 121 IU/L Final                  **Please note reference interval change**   • AST (SGOT) 10/21/2021 28  0 - 40 IU/L Final   • ALT (SGPT) 10/21/2021 34  0 - 44 IU/L Final   • Hemoglobin A1C 10/21/2021 5.7* 4.8 - 5.6 % Final    Comment:          Prediabetes: 5.7 - 6.4           Diabetes: >6.4           Glycemic control for adults with diabetes: <7.0           Assessment/Plan   Diagnoses and all orders for this visit:    1. Type 2 diabetes mellitus without complication, without long-term current use of insulin (HCC) (Primary)  -     Comprehensive metabolic panel; Future  -     TSH; Future  -     Hemoglobin A1c; Future  -     Vitamin D 25 hydroxy; Future  -     Lipid panel; Future  -     CBC w AUTO Differential; Future    2. Mixed hyperlipidemia  -     atorvastatin (LIPITOR) 80 MG tablet; Take 1 tablet by mouth Daily.  Dispense: 90 tablet; Refill: 1  -     fenofibrate 160 MG tablet; Take 1 tablet by mouth Daily.  Dispense: 90 tablet; Refill: 1  -     Comprehensive metabolic panel; Future  -     TSH;  Future  -     Hemoglobin A1c; Future  -     Vitamin D 25 hydroxy; Future  -     Lipid panel; Future  -     CBC w AUTO Differential; Future    3. Acquired hypothyroidism  -     Comprehensive metabolic panel; Future  -     TSH; Future  -     Hemoglobin A1c; Future  -     Vitamin D 25 hydroxy; Future  -     Lipid panel; Future  -     CBC w AUTO Differential; Future    4. Exogenous obesity  -     Comprehensive metabolic panel; Future  -     TSH; Future  -     Hemoglobin A1c; Future  -     Vitamin D 25 hydroxy; Future  -     Lipid panel; Future  -     CBC w AUTO Differential; Future    5. Vitamin D deficiency  -     Comprehensive metabolic panel; Future  -     TSH; Future  -     Hemoglobin A1c; Future  -     Vitamin D 25 hydroxy; Future  -     Lipid panel; Future  -     CBC w AUTO Differential; Future        Return in about 6 months (around 6/7/2022) for Recheck.

## 2022-02-12 DIAGNOSIS — E11.9 TYPE 2 DIABETES MELLITUS WITHOUT COMPLICATION, WITHOUT LONG-TERM CURRENT USE OF INSULIN: ICD-10-CM

## 2022-02-14 RX ORDER — GLIMEPIRIDE 2 MG/1
TABLET ORAL
Qty: 90 TABLET | Refills: 1 | Status: SHIPPED | OUTPATIENT
Start: 2022-02-14 | End: 2022-08-12

## 2022-03-02 DIAGNOSIS — E11.9 TYPE 2 DIABETES MELLITUS WITHOUT COMPLICATION, WITHOUT LONG-TERM CURRENT USE OF INSULIN: ICD-10-CM

## 2022-04-28 DIAGNOSIS — E66.09 EXOGENOUS OBESITY: ICD-10-CM

## 2022-04-28 DIAGNOSIS — E03.9 ACQUIRED HYPOTHYROIDISM: ICD-10-CM

## 2022-04-28 DIAGNOSIS — E55.9 VITAMIN D DEFICIENCY: ICD-10-CM

## 2022-04-28 DIAGNOSIS — E11.9 TYPE 2 DIABETES MELLITUS WITHOUT COMPLICATION, WITHOUT LONG-TERM CURRENT USE OF INSULIN: ICD-10-CM

## 2022-04-28 DIAGNOSIS — E78.2 MIXED HYPERLIPIDEMIA: ICD-10-CM

## 2022-05-07 LAB
25(OH)D3+25(OH)D2 SERPL-MCNC: 102 NG/ML (ref 30–100)
ALBUMIN SERPL-MCNC: 4.3 G/DL (ref 3.7–4.7)
ALBUMIN/GLOB SERPL: 1.3 {RATIO} (ref 1.2–2.2)
ALP SERPL-CCNC: 34 IU/L (ref 44–121)
ALT SERPL-CCNC: 35 IU/L (ref 0–44)
AST SERPL-CCNC: 27 IU/L (ref 0–40)
BASOPHILS # BLD AUTO: 0.1 X10E3/UL (ref 0–0.2)
BASOPHILS NFR BLD AUTO: 1 %
BILIRUB SERPL-MCNC: 0.5 MG/DL (ref 0–1.2)
BUN SERPL-MCNC: 26 MG/DL (ref 8–27)
BUN/CREAT SERPL: 21 (ref 10–24)
CALCIUM SERPL-MCNC: 9.7 MG/DL (ref 8.6–10.2)
CHLORIDE SERPL-SCNC: 105 MMOL/L (ref 96–106)
CHOLEST SERPL-MCNC: 154 MG/DL (ref 100–199)
CO2 SERPL-SCNC: 21 MMOL/L (ref 20–29)
CREAT SERPL-MCNC: 1.26 MG/DL (ref 0.76–1.27)
EGFRCR SERPLBLD CKD-EPI 2021: 61 ML/MIN/1.73
EOSINOPHIL # BLD AUTO: 0.1 X10E3/UL (ref 0–0.4)
EOSINOPHIL NFR BLD AUTO: 2 %
ERYTHROCYTE [DISTWIDTH] IN BLOOD BY AUTOMATED COUNT: 13.3 % (ref 11.6–15.4)
GLOBULIN SER CALC-MCNC: 3.4 G/DL (ref 1.5–4.5)
GLUCOSE SERPL-MCNC: 93 MG/DL (ref 65–99)
HBA1C MFR BLD: 5.8 % (ref 4.8–5.6)
HCT VFR BLD AUTO: 44.6 % (ref 37.5–51)
HDLC SERPL-MCNC: 49 MG/DL
HGB BLD-MCNC: 14.6 G/DL (ref 13–17.7)
IMM GRANULOCYTES # BLD AUTO: 0 X10E3/UL (ref 0–0.1)
IMM GRANULOCYTES NFR BLD AUTO: 0 %
LDLC SERPL CALC-MCNC: 94 MG/DL (ref 0–99)
LYMPHOCYTES # BLD AUTO: 1.4 X10E3/UL (ref 0.7–3.1)
LYMPHOCYTES NFR BLD AUTO: 26 %
MCH RBC QN AUTO: 30.1 PG (ref 26.6–33)
MCHC RBC AUTO-ENTMCNC: 32.7 G/DL (ref 31.5–35.7)
MCV RBC AUTO: 92 FL (ref 79–97)
MONOCYTES # BLD AUTO: 0.5 X10E3/UL (ref 0.1–0.9)
MONOCYTES NFR BLD AUTO: 10 %
NEUTROPHILS # BLD AUTO: 3.2 X10E3/UL (ref 1.4–7)
NEUTROPHILS NFR BLD AUTO: 61 %
PLATELET # BLD AUTO: 250 X10E3/UL (ref 150–450)
POTASSIUM SERPL-SCNC: 4.3 MMOL/L (ref 3.5–5.2)
PROT SERPL-MCNC: 7.7 G/DL (ref 6–8.5)
RBC # BLD AUTO: 4.85 X10E6/UL (ref 4.14–5.8)
SODIUM SERPL-SCNC: 142 MMOL/L (ref 134–144)
TRIGL SERPL-MCNC: 54 MG/DL (ref 0–149)
TSH SERPL DL<=0.005 MIU/L-ACNC: 5.38 UIU/ML (ref 0.45–4.5)
VLDLC SERPL CALC-MCNC: 11 MG/DL (ref 5–40)
WBC # BLD AUTO: 5.3 X10E3/UL (ref 3.4–10.8)

## 2022-05-11 ENCOUNTER — OFFICE VISIT (OUTPATIENT)
Dept: FAMILY MEDICINE CLINIC | Facility: CLINIC | Age: 72
End: 2022-05-11

## 2022-05-11 VITALS
WEIGHT: 230 LBS | HEART RATE: 76 BPM | TEMPERATURE: 97.1 F | SYSTOLIC BLOOD PRESSURE: 132 MMHG | BODY MASS INDEX: 31.15 KG/M2 | DIASTOLIC BLOOD PRESSURE: 80 MMHG | OXYGEN SATURATION: 98 % | HEIGHT: 72 IN

## 2022-05-11 DIAGNOSIS — E66.09 EXOGENOUS OBESITY: ICD-10-CM

## 2022-05-11 DIAGNOSIS — R35.1 NOCTURIA: ICD-10-CM

## 2022-05-11 DIAGNOSIS — E11.9 TYPE 2 DIABETES MELLITUS WITHOUT COMPLICATION, WITHOUT LONG-TERM CURRENT USE OF INSULIN: Primary | ICD-10-CM

## 2022-05-11 DIAGNOSIS — E55.9 VITAMIN D DEFICIENCY: ICD-10-CM

## 2022-05-11 DIAGNOSIS — E03.8 SUBCLINICAL HYPOTHYROIDISM: ICD-10-CM

## 2022-05-11 DIAGNOSIS — E78.2 MIXED HYPERLIPIDEMIA: ICD-10-CM

## 2022-05-11 PROCEDURE — 99214 OFFICE O/P EST MOD 30 MIN: CPT | Performed by: FAMILY MEDICINE

## 2022-05-11 RX ORDER — FENOFIBRATE 160 MG/1
160 TABLET ORAL DAILY
Qty: 90 TABLET | Refills: 1 | Status: SHIPPED | OUTPATIENT
Start: 2022-05-11 | End: 2022-11-02 | Stop reason: SDUPTHER

## 2022-05-11 RX ORDER — EZETIMIBE 10 MG/1
10 TABLET ORAL DAILY
Qty: 90 TABLET | Refills: 1 | Status: SHIPPED | OUTPATIENT
Start: 2022-05-11 | End: 2022-11-02 | Stop reason: SDUPTHER

## 2022-05-11 RX ORDER — ATORVASTATIN CALCIUM 80 MG/1
80 TABLET, FILM COATED ORAL DAILY
Qty: 90 TABLET | Refills: 1 | Status: SHIPPED | OUTPATIENT
Start: 2022-05-11 | End: 2022-11-02 | Stop reason: SDUPTHER

## 2022-05-11 NOTE — PROGRESS NOTES
Subjective   Krishna Le is a 71 y.o. male with   Chief Complaint   Patient presents with   • Diabetes   .    History of Present Illness   71-year-old white male with known history of type II non-insulin-dependent diabetes mellitus.  Patient also with history of hyperlipidemia and vitamin D deficiency.  Medications include atorvastatin at 80 mg daily as well as fenofibrate at 160 mg daily.  Glimepiride is used at 2 mg at 1 daily in combination with metformin at 1000 mg twice daily.  Patient does take a host of over-the-counter supplements and vitamins including vitamin D3.  Fasting labs have been acquired prior to this visit.  Patient has no formal exercise program and for the most part lives somewhat sedentarily.  He and his wife who are now retired and are traveling with a trailer.  All medications are used appropriately and are well-tolerated without side effects.  The following portions of the patient's history were reviewed and updated as appropriate: allergies, current medications, past family history, past medical history, past social history, past surgical history and problem list.    Review of Systems   Cardiovascular:        Hyperlipidemia   Endocrine:        Type II non-insulin-dependent diabetes mellitus, vitamin D deficiency, exogenous obesity       Objective     Vitals:    05/11/22 0938   BP: 132/80   Pulse: 76   Temp: 97.1 °F (36.2 °C)   SpO2: 98%       Recent Results (from the past 672 hour(s))   CBC w AUTO Differential    Collection Time: 05/06/22  8:57 AM    Specimen: Blood   Result Value Ref Range    WBC 5.3 3.4 - 10.8 x10E3/uL    RBC 4.85 4.14 - 5.80 x10E6/uL    Hemoglobin 14.6 13.0 - 17.7 g/dL    Hematocrit 44.6 37.5 - 51.0 %    MCV 92 79 - 97 fL    MCH 30.1 26.6 - 33.0 pg    MCHC 32.7 31.5 - 35.7 g/dL    RDW 13.3 11.6 - 15.4 %    Platelets 250 150 - 450 x10E3/uL    Neutrophil Rel % 61 Not Estab. %    Lymphocyte Rel % 26 Not Estab. %    Monocyte Rel % 10 Not Estab. %    Eosinophil Rel % 2  Not Estab. %    Basophil Rel % 1 Not Estab. %    Neutrophils Absolute 3.2 1.4 - 7.0 x10E3/uL    Lymphocytes Absolute 1.4 0.7 - 3.1 x10E3/uL    Monocytes Absolute 0.5 0.1 - 0.9 x10E3/uL    Eosinophils Absolute 0.1 0.0 - 0.4 x10E3/uL    Basophils Absolute 0.1 0.0 - 0.2 x10E3/uL    Immature Granulocyte Rel % 0 Not Estab. %    Immature Grans Absolute 0.0 0.0 - 0.1 x10E3/uL   Lipid panel    Collection Time: 05/06/22  8:57 AM    Specimen: Blood   Result Value Ref Range    Total Cholesterol 154 100 - 199 mg/dL    Triglycerides 54 0 - 149 mg/dL    HDL Cholesterol 49 >39 mg/dL    VLDL Cholesterol Uzair 11 5 - 40 mg/dL    LDL Chol Calc (NIH) 94 0 - 99 mg/dL   Vitamin D 25 hydroxy    Collection Time: 05/06/22  8:57 AM    Specimen: Blood   Result Value Ref Range    25 Hydroxy, Vitamin D 102.0 (H) 30.0 - 100.0 ng/mL   Hemoglobin A1c    Collection Time: 05/06/22  8:57 AM    Specimen: Blood   Result Value Ref Range    Hemoglobin A1C 5.8 (H) 4.8 - 5.6 %   TSH    Collection Time: 05/06/22  8:57 AM    Specimen: Blood   Result Value Ref Range    TSH 5.380 (H) 0.450 - 4.500 uIU/mL   Comprehensive metabolic panel    Collection Time: 05/06/22  8:57 AM    Specimen: Blood   Result Value Ref Range    Glucose 93 65 - 99 mg/dL    BUN 26 8 - 27 mg/dL    Creatinine 1.26 0.76 - 1.27 mg/dL    EGFR Result 61 >59 mL/min/1.73    BUN/Creatinine Ratio 21 10 - 24    Sodium 142 134 - 144 mmol/L    Potassium 4.3 3.5 - 5.2 mmol/L    Chloride 105 96 - 106 mmol/L    Total CO2 21 20 - 29 mmol/L    Calcium 9.7 8.6 - 10.2 mg/dL    Total Protein 7.7 6.0 - 8.5 g/dL    Albumin 4.3 3.7 - 4.7 g/dL    Globulin 3.4 1.5 - 4.5 g/dL    A/G Ratio 1.3 1.2 - 2.2    Total Bilirubin 0.5 0.0 - 1.2 mg/dL    Alkaline Phosphatase 34 (L) 44 - 121 IU/L    AST (SGOT) 27 0 - 40 IU/L    ALT (SGPT) 35 0 - 44 IU/L       Physical Exam  Vitals and nursing note reviewed.   Constitutional:       Appearance: Normal appearance. He is well-developed and well-groomed. He is obese.      Comments:  Exogenous obesity with a BMI of 31.2   HENT:      Head: Normocephalic and atraumatic.   Neck:      Thyroid: No thyroid mass or thyromegaly.      Vascular: Normal carotid pulses. No carotid bruit.      Trachea: Trachea and phonation normal.   Cardiovascular:      Rate and Rhythm: Normal rate and regular rhythm.      Heart sounds: Normal heart sounds. No murmur heard.    No friction rub. No gallop.   Pulmonary:      Effort: Pulmonary effort is normal. No respiratory distress.      Breath sounds: Normal breath sounds. No decreased breath sounds, wheezing, rhonchi or rales.   Musculoskeletal:      Cervical back: Neck supple.   Lymphadenopathy:      Cervical: No cervical adenopathy.   Skin:     General: Skin is warm and dry.      Findings: No rash.   Neurological:      Mental Status: He is alert and oriented to person, place, and time.   Psychiatric:         Attention and Perception: Attention and perception normal.         Mood and Affect: Mood and affect normal.         Speech: Speech normal.         Behavior: Behavior normal. Behavior is cooperative.         Thought Content: Thought content normal.         Cognition and Memory: Cognition and memory normal.         Judgment: Judgment normal.         Assessment & Plan   Diagnoses and all orders for this visit:    1. Type 2 diabetes mellitus without complication, without long-term current use of insulin (HCC) (Primary)  -     metFORMIN (GLUCOPHAGE) 1000 MG tablet; Take 1 tablet by mouth 2 (Two) Times a Day With Meals.  Dispense: 180 tablet; Refill: 1  -     Lipid panel; Future  -     Hemoglobin A1c; Future  -     Comprehensive metabolic panel; Future  -     Vitamin D 25 hydroxy; Future  -     TSH; Future  -     PSA DIAGNOSTIC ONLY; Future  -     CBC w AUTO Differential; Future    2. Exogenous obesity  -     Lipid panel; Future  -     Hemoglobin A1c; Future  -     Comprehensive metabolic panel; Future  -     Vitamin D 25 hydroxy; Future  -     TSH; Future  -     PSA  DIAGNOSTIC ONLY; Future  -     CBC w AUTO Differential; Future    3. Vitamin D deficiency  -     Lipid panel; Future  -     Hemoglobin A1c; Future  -     Comprehensive metabolic panel; Future  -     Vitamin D 25 hydroxy; Future  -     TSH; Future  -     PSA DIAGNOSTIC ONLY; Future  -     CBC w AUTO Differential; Future    4. Mixed hyperlipidemia  -     atorvastatin (LIPITOR) 80 MG tablet; Take 1 tablet by mouth Daily.  Dispense: 90 tablet; Refill: 1  -     ezetimibe (Zetia) 10 MG tablet; Take 1 tablet by mouth Daily.  Dispense: 90 tablet; Refill: 1  -     fenofibrate 160 MG tablet; Take 1 tablet by mouth Daily.  Dispense: 90 tablet; Refill: 1  -     Lipid panel; Future  -     Hemoglobin A1c; Future  -     Comprehensive metabolic panel; Future  -     Vitamin D 25 hydroxy; Future  -     TSH; Future  -     PSA DIAGNOSTIC ONLY; Future  -     CBC w AUTO Differential; Future    5. Subclinical hypothyroidism  -     Lipid panel; Future  -     Hemoglobin A1c; Future  -     Comprehensive metabolic panel; Future  -     Vitamin D 25 hydroxy; Future  -     TSH; Future  -     PSA DIAGNOSTIC ONLY; Future  -     CBC w AUTO Differential; Future    6. Nocturia  -     PSA DIAGNOSTIC ONLY; Future        Return in about 6 months (around 11/11/2022) for Recheck.

## 2022-08-12 DIAGNOSIS — E11.9 TYPE 2 DIABETES MELLITUS WITHOUT COMPLICATION, WITHOUT LONG-TERM CURRENT USE OF INSULIN: ICD-10-CM

## 2022-08-12 RX ORDER — GLIMEPIRIDE 2 MG/1
TABLET ORAL
Qty: 90 TABLET | Refills: 0 | Status: SHIPPED | OUTPATIENT
Start: 2022-08-12 | End: 2022-11-02 | Stop reason: SDUPTHER

## 2022-10-21 DIAGNOSIS — E55.9 VITAMIN D DEFICIENCY: ICD-10-CM

## 2022-10-21 DIAGNOSIS — R35.1 NOCTURIA: ICD-10-CM

## 2022-10-21 DIAGNOSIS — E03.8 SUBCLINICAL HYPOTHYROIDISM: ICD-10-CM

## 2022-10-21 DIAGNOSIS — E11.9 TYPE 2 DIABETES MELLITUS WITHOUT COMPLICATION, WITHOUT LONG-TERM CURRENT USE OF INSULIN: ICD-10-CM

## 2022-10-21 DIAGNOSIS — E66.09 EXOGENOUS OBESITY: ICD-10-CM

## 2022-10-21 DIAGNOSIS — E78.2 MIXED HYPERLIPIDEMIA: ICD-10-CM

## 2022-10-27 LAB
25(OH)D3+25(OH)D2 SERPL-MCNC: 116 NG/ML (ref 30–100)
ALBUMIN SERPL-MCNC: 4.4 G/DL (ref 3.5–5.2)
ALBUMIN/GLOB SERPL: 1.2 G/DL
ALP SERPL-CCNC: 32 U/L (ref 39–117)
ALT SERPL-CCNC: 31 U/L (ref 1–41)
AST SERPL-CCNC: 24 U/L (ref 1–40)
BASOPHILS # BLD AUTO: 0.07 10*3/MM3 (ref 0–0.2)
BASOPHILS NFR BLD AUTO: 1.1 % (ref 0–1.5)
BILIRUB SERPL-MCNC: 0.4 MG/DL (ref 0–1.2)
BUN SERPL-MCNC: 23 MG/DL (ref 8–23)
BUN/CREAT SERPL: 20.4 (ref 7–25)
CALCIUM SERPL-MCNC: 10 MG/DL (ref 8.6–10.5)
CHLORIDE SERPL-SCNC: 108 MMOL/L (ref 98–107)
CHOLEST SERPL-MCNC: 130 MG/DL (ref 0–200)
CO2 SERPL-SCNC: 26 MMOL/L (ref 22–29)
CREAT SERPL-MCNC: 1.13 MG/DL (ref 0.76–1.27)
EGFRCR SERPLBLD CKD-EPI 2021: 68.6 ML/MIN/1.73
EOSINOPHIL # BLD AUTO: 0.09 10*3/MM3 (ref 0–0.4)
EOSINOPHIL NFR BLD AUTO: 1.5 % (ref 0.3–6.2)
ERYTHROCYTE [DISTWIDTH] IN BLOOD BY AUTOMATED COUNT: 12.6 % (ref 12.3–15.4)
GLOBULIN SER CALC-MCNC: 3.6 GM/DL
GLUCOSE SERPL-MCNC: 104 MG/DL (ref 65–99)
HBA1C MFR BLD: 5.7 % (ref 4.8–5.6)
HCT VFR BLD AUTO: 44 % (ref 37.5–51)
HDLC SERPL-MCNC: 55 MG/DL (ref 40–60)
HGB BLD-MCNC: 15 G/DL (ref 13–17.7)
IMM GRANULOCYTES # BLD AUTO: 0.02 10*3/MM3 (ref 0–0.05)
IMM GRANULOCYTES NFR BLD AUTO: 0.3 % (ref 0–0.5)
LDLC SERPL CALC-MCNC: 63 MG/DL (ref 0–100)
LYMPHOCYTES # BLD AUTO: 1.58 10*3/MM3 (ref 0.7–3.1)
LYMPHOCYTES NFR BLD AUTO: 25.5 % (ref 19.6–45.3)
MCH RBC QN AUTO: 30.3 PG (ref 26.6–33)
MCHC RBC AUTO-ENTMCNC: 34.1 G/DL (ref 31.5–35.7)
MCV RBC AUTO: 88.9 FL (ref 79–97)
MONOCYTES # BLD AUTO: 0.55 10*3/MM3 (ref 0.1–0.9)
MONOCYTES NFR BLD AUTO: 8.9 % (ref 5–12)
NEUTROPHILS # BLD AUTO: 3.88 10*3/MM3 (ref 1.7–7)
NEUTROPHILS NFR BLD AUTO: 62.7 % (ref 42.7–76)
NRBC BLD AUTO-RTO: 0 /100 WBC (ref 0–0.2)
PLATELET # BLD AUTO: 259 10*3/MM3 (ref 140–450)
POTASSIUM SERPL-SCNC: 4.8 MMOL/L (ref 3.5–5.2)
PROT SERPL-MCNC: 8 G/DL (ref 6–8.5)
PSA SERPL-MCNC: 4.28 NG/ML (ref 0–4)
RBC # BLD AUTO: 4.95 10*6/MM3 (ref 4.14–5.8)
SODIUM SERPL-SCNC: 143 MMOL/L (ref 136–145)
TRIGL SERPL-MCNC: 53 MG/DL (ref 0–150)
TSH SERPL DL<=0.005 MIU/L-ACNC: 2.86 UIU/ML (ref 0.27–4.2)
VLDLC SERPL CALC-MCNC: 12 MG/DL (ref 5–40)
WBC # BLD AUTO: 6.19 10*3/MM3 (ref 3.4–10.8)

## 2022-11-02 ENCOUNTER — OFFICE VISIT (OUTPATIENT)
Dept: FAMILY MEDICINE CLINIC | Facility: CLINIC | Age: 72
End: 2022-11-02

## 2022-11-02 ENCOUNTER — TELEPHONE (OUTPATIENT)
Dept: FAMILY MEDICINE CLINIC | Facility: CLINIC | Age: 72
End: 2022-11-02

## 2022-11-02 VITALS
TEMPERATURE: 98 F | DIASTOLIC BLOOD PRESSURE: 80 MMHG | OXYGEN SATURATION: 98 % | HEIGHT: 72 IN | SYSTOLIC BLOOD PRESSURE: 130 MMHG | HEART RATE: 80 BPM | BODY MASS INDEX: 30.34 KG/M2 | WEIGHT: 224 LBS

## 2022-11-02 DIAGNOSIS — E78.2 MIXED HYPERLIPIDEMIA: ICD-10-CM

## 2022-11-02 DIAGNOSIS — Z00.00 MEDICARE ANNUAL WELLNESS VISIT, SUBSEQUENT: Primary | ICD-10-CM

## 2022-11-02 DIAGNOSIS — E66.09 EXOGENOUS OBESITY: ICD-10-CM

## 2022-11-02 DIAGNOSIS — E55.9 VITAMIN D DEFICIENCY: ICD-10-CM

## 2022-11-02 DIAGNOSIS — E11.9 TYPE 2 DIABETES MELLITUS WITHOUT COMPLICATION, WITHOUT LONG-TERM CURRENT USE OF INSULIN: ICD-10-CM

## 2022-11-02 PROCEDURE — 1170F FXNL STATUS ASSESSED: CPT | Performed by: FAMILY MEDICINE

## 2022-11-02 PROCEDURE — 1125F AMNT PAIN NOTED PAIN PRSNT: CPT | Performed by: FAMILY MEDICINE

## 2022-11-02 PROCEDURE — G0439 PPPS, SUBSEQ VISIT: HCPCS | Performed by: FAMILY MEDICINE

## 2022-11-02 PROCEDURE — 1160F RVW MEDS BY RX/DR IN RCRD: CPT | Performed by: FAMILY MEDICINE

## 2022-11-02 RX ORDER — GLIMEPIRIDE 2 MG/1
2 TABLET ORAL
Qty: 90 TABLET | Refills: 1 | Status: SHIPPED | OUTPATIENT
Start: 2022-11-02

## 2022-11-02 RX ORDER — FENOFIBRATE 160 MG/1
160 TABLET ORAL DAILY
Qty: 90 TABLET | Refills: 1 | Status: SHIPPED | OUTPATIENT
Start: 2022-11-02

## 2022-11-02 RX ORDER — EZETIMIBE 10 MG/1
10 TABLET ORAL DAILY
Qty: 90 TABLET | Refills: 1 | Status: SHIPPED | OUTPATIENT
Start: 2022-11-02

## 2022-11-02 RX ORDER — ATORVASTATIN CALCIUM 80 MG/1
80 TABLET, FILM COATED ORAL DAILY
Qty: 90 TABLET | Refills: 1 | Status: SHIPPED | OUTPATIENT
Start: 2022-11-02

## 2022-11-02 NOTE — PROGRESS NOTES
The ABCs of the Annual Wellness Visit  Subsequent Medicare Wellness Visit    Chief Complaint   Patient presents with   • Medicare Wellness-subsequent      Subjective    History of Present Illness:  Krishna Le is a 72 y.o. male who presents for a Subsequent Medicare Wellness Visit.  72-year-old white male here for subsequent Medicare wellness visit as well as for reevaluation of current medical problems including type II non-insulin-dependent diabetes mellitus, hyperlipidemia as well as hypertriglyceridemia.  Current medications are multiple and are as listed.  He also uses a host of supplements and vitamins including vitamin D3.  Fasting labs have been acquired prior to this visit.  In general he has been doing well and has no acute complaints.    The following portions of the patient's history were reviewed and   updated as appropriate: allergies, current medications, past family history, past medical history, past social history, past surgical history and problem list.    Compared to one year ago, the patient feels his physical   health is better.    Compared to one year ago, the patient feels his mental   health is the same.    Recent Hospitalizations: NO        Current Medical Providers:  Patient Care Team:  Papo Anand DO as PCP - General (Family Medicine)    Outpatient Medications Prior to Visit   Medication Sig Dispense Refill   • CALCIUM ACETATE-MAGNESIUM CARB PO Take  by mouth.     • Cetirizine HCl 10 MG capsule Take  by mouth.     • Cholecalciferol (VITAMIN D3) 125 MCG (5000 UT) capsule capsule Take 10,000 Units by mouth Daily.     • Dupilumab 300 MG/2ML solution pen-injector Inject 2 mL under the skin into the appropriate area as directed. 2 injections twice per month     • glucose blood test strip Use as instructed to check blood sugar once daily 100 each 4   • glucose monitor monitoring kit Use to check blood sugar once daily 1 each 0   • Lancets Misc. kit Use stylus and lancet to check  blood sugar once daily 100 each 4   • Milk Thistle 1000 MG capsule Take  by mouth.     • Misc Natural Products (GLUCOSAMINE CHOND MSM FORMULA PO) Take  by mouth.     • Sodium Fluoride 5000 Sensitive 1.1-5 % paste      • vitamin C (ASCORBIC ACID) 500 MG tablet Take 500 mg by mouth Daily.     • atorvastatin (LIPITOR) 80 MG tablet Take 1 tablet by mouth Daily. 90 tablet 1   • ezetimibe (Zetia) 10 MG tablet Take 1 tablet by mouth Daily. 90 tablet 1   • fenofibrate 160 MG tablet Take 1 tablet by mouth Daily. 90 tablet 1   • glimepiride (AMARYL) 2 MG tablet TAKE ONE TABLET BY MOUTH EVERY MORNING BEFORE BREAKFAST 90 tablet 0   • metFORMIN (GLUCOPHAGE) 1000 MG tablet Take 1 tablet by mouth 2 (Two) Times a Day With Meals. 180 tablet 1     No facility-administered medications prior to visit.       No opioid medication identified on active medication list. I have reviewed chart for other potential  high risk medication/s and harmful drug interactions in the elderly.          Aspirin is not on active medication list.  Aspirin use is not indicated based on review of current medical condition/s. Risk of harm outweighs potential benefits.  .    Patient Active Problem List   Diagnosis   • Mixed hyperlipidemia   • Erectile dysfunction   • Vitamin D deficiency   • Exogenous obesity   • Adenomatous polyp of colon   • Type 2 diabetes mellitus without complication, without long-term current use of insulin (HCC)   • Acquired hypothyroidism   • Subclinical hypothyroidism     Advance Care Planning  Advance Directive is on file.  ACP discussion was held with the patient during this visit. Patient has an advance directive in EMR which is still valid.     Review of Systems   Cardiovascular:        Hyperlipidemia   Endocrine:        Borderline exogenous obesity, vitamin D deficiency, type II non-insulin-dependent diabetes mellitus        Objective    Vitals:    11/02/22 0902   BP: 130/80   Pulse: 80   Temp: 98 °F (36.7 °C)   SpO2: 98%  "  Weight: 102 kg (224 lb)   Height: 182.9 cm (72\")   PainSc:   4   PainLoc: Neck     Estimated body mass index is 30.38 kg/m² as calculated from the following:    Height as of this encounter: 182.9 cm (72\").    Weight as of this encounter: 102 kg (224 lb).    BMI is >= 30 and <35. (Class 1 Obesity). The following options were offered after discussion;: exercise counseling/recommendations and nutrition counseling/recommendations      Does the patient have evidence of cognitive impairment? No    Physical Exam  Vitals and nursing note reviewed.   Constitutional:       Appearance: Normal appearance. He is well-developed and well-groomed. He is obese.      Comments: Borderline exogenous obesity with a BMI of 30.4- 6 pound weight loss since last visit.   HENT:      Head: Normocephalic and atraumatic.   Neck:      Thyroid: No thyroid mass or thyromegaly.      Vascular: Normal carotid pulses. No carotid bruit.      Trachea: Trachea and phonation normal.   Cardiovascular:      Rate and Rhythm: Normal rate and regular rhythm.      Heart sounds: Normal heart sounds. No murmur heard.    No friction rub. No gallop.   Pulmonary:      Effort: Pulmonary effort is normal. No respiratory distress.      Breath sounds: Normal breath sounds. No decreased breath sounds, wheezing, rhonchi or rales.   Musculoskeletal:      Cervical back: Neck supple.   Lymphadenopathy:      Cervical: No cervical adenopathy.   Skin:     General: Skin is warm and dry.      Findings: No rash.   Neurological:      Mental Status: He is alert and oriented to person, place, and time.   Psychiatric:         Attention and Perception: Attention and perception normal.         Mood and Affect: Mood and affect normal.         Speech: Speech normal.         Behavior: Behavior normal. Behavior is cooperative.         Thought Content: Thought content normal.         Cognition and Memory: Cognition and memory normal.         Judgment: Judgment normal.       Orders Only on " 10/21/2022   Component Date Value Ref Range Status   • WBC 10/26/2022 6.19  3.40 - 10.80 10*3/mm3 Final   • RBC 10/26/2022 4.95  4.14 - 5.80 10*6/mm3 Final   • Hemoglobin 10/26/2022 15.0  13.0 - 17.7 g/dL Final   • Hematocrit 10/26/2022 44.0  37.5 - 51.0 % Final   • MCV 10/26/2022 88.9  79.0 - 97.0 fL Final   • MCH 10/26/2022 30.3  26.6 - 33.0 pg Final   • MCHC 10/26/2022 34.1  31.5 - 35.7 g/dL Final   • RDW 10/26/2022 12.6  12.3 - 15.4 % Final   • Platelets 10/26/2022 259  140 - 450 10*3/mm3 Final   • Neutrophil Rel % 10/26/2022 62.7  42.7 - 76.0 % Final   • Lymphocyte Rel % 10/26/2022 25.5  19.6 - 45.3 % Final   • Monocyte Rel % 10/26/2022 8.9  5.0 - 12.0 % Final   • Eosinophil Rel % 10/26/2022 1.5  0.3 - 6.2 % Final   • Basophil Rel % 10/26/2022 1.1  0.0 - 1.5 % Final   • Neutrophils Absolute 10/26/2022 3.88  1.70 - 7.00 10*3/mm3 Final   • Lymphocytes Absolute 10/26/2022 1.58  0.70 - 3.10 10*3/mm3 Final   • Monocytes Absolute 10/26/2022 0.55  0.10 - 0.90 10*3/mm3 Final   • Eosinophils Absolute 10/26/2022 0.09  0.00 - 0.40 10*3/mm3 Final   • Basophils Absolute 10/26/2022 0.07  0.00 - 0.20 10*3/mm3 Final   • Immature Granulocyte Rel % 10/26/2022 0.3  0.0 - 0.5 % Final   • Immature Grans Absolute 10/26/2022 0.02  0.00 - 0.05 10*3/mm3 Final   • nRBC 10/26/2022 0.0  0.0 - 0.2 /100 WBC Final   • PSA 10/26/2022 4.280 (H)  0.000 - 4.000 ng/mL Final    Results may be falsely decreased if patient taking Biotin.   • TSH 10/26/2022 2.860  0.270 - 4.200 uIU/mL Final   • 25 Hydroxy, Vitamin D 10/26/2022 116.0 (H)  30.0 - 100.0 ng/ml Final    Comment: Results may be falsely increased if patient taking Biotin.  Reference Range for Total Vitamin D 25(OH)  Deficiency <20.0 ng/mL  Insufficiency 21-29 ng/mL  Sufficiency  ng/mL  Toxicity >100 ng/ml     • Glucose 10/26/2022 104 (H)  65 - 99 mg/dL Final   • BUN 10/26/2022 23  8 - 23 mg/dL Final   • Creatinine 10/26/2022 1.13  0.76 - 1.27 mg/dL Final   • EGFR Result 10/26/2022  68.6  >60.0 mL/min/1.73 Final    Comment: National Kidney Foundation and American Society of  Nephrology (ASN) Task Force recommended calculation based  on the Chronic Kidney Disease Epidemiology Collaboration  (CKD-EPI) equation refit without adjustment for race.  GFR Normal >60  Chronic Kidney Disease <60  Kidney Failure <15  The GFR formula is only valid for adults with stable renal  function between ages 18 and 70.     • BUN/Creatinine Ratio 10/26/2022 20.4  7.0 - 25.0 Final   • Sodium 10/26/2022 143  136 - 145 mmol/L Final   • Potassium 10/26/2022 4.8  3.5 - 5.2 mmol/L Final   • Chloride 10/26/2022 108 (H)  98 - 107 mmol/L Final   • Total CO2 10/26/2022 26.0  22.0 - 29.0 mmol/L Final   • Calcium 10/26/2022 10.0  8.6 - 10.5 mg/dL Final   • Total Protein 10/26/2022 8.0  6.0 - 8.5 g/dL Final   • Albumin 10/26/2022 4.40  3.50 - 5.20 g/dL Final   • Globulin 10/26/2022 3.6  gm/dL Final   • A/G Ratio 10/26/2022 1.2  g/dL Final   • Total Bilirubin 10/26/2022 0.4  0.0 - 1.2 mg/dL Final   • Alkaline Phosphatase 10/26/2022 32 (L)  39 - 117 U/L Final   • AST (SGOT) 10/26/2022 24  1 - 40 U/L Final   • ALT (SGPT) 10/26/2022 31  1 - 41 U/L Final   • Hemoglobin A1C 10/26/2022 5.70 (H)  4.80 - 5.60 % Final    Comment: Hemoglobin A1C Ranges:  Increased Risk for Diabetes  5.7% to 6.4%  Diabetes                     >= 6.5%  Diabetic Goal                < 7.0%     • Total Cholesterol 10/26/2022 130  0 - 200 mg/dL Final    Comment: Cholesterol Reference Ranges  (U.S. Department of Health and Human Services ATP III  Classifications)  Desirable          <200 mg/dL  Borderline High    200-239 mg/dL  High Risk          >240 mg/dL  Triglyceride Reference Ranges  (U.S. Department of Health and Human Services ATP III  Classifications)  Normal           <150 mg/dL  Borderline High  150-199 mg/dL  High             200-499 mg/dL  Very High        >500 mg/dL  HDL Reference Ranges  (U.S. Department of Health and Human Services ATP  III  Classifications)  Low     <40 mg/dl (major risk factor for CHD)  High    >60 mg/dl ('negative' risk factor for CHD)  LDL Reference Ranges  (U.S. Department of Health and Human Services ATP III  Classifications)  Optimal          <100 mg/dL  Near Optimal     100-129 mg/dL  Borderline High  130-159 mg/dL  High             160-189 mg/dL  Very High        >189 mg/dL     • Triglycerides 10/26/2022 53  0 - 150 mg/dL Final   • HDL Cholesterol 10/26/2022 55  40 - 60 mg/dL Final   • VLDL Cholesterol Uzair 10/26/2022 12  5 - 40 mg/dL Final   • LDL Chol Calc ([facility]) 10/26/2022 63  0 - 100 mg/dL Final       Lab Results   Component Value Date    CHLPL 130 10/26/2022    TRIG 53 10/26/2022    HDL 55 10/26/2022    LDL 63 10/26/2022    VLDL 12 10/26/2022    HGBA1C 5.70 (H) 10/26/2022            HEALTH RISK ASSESSMENT    Smoking Status:  Social History     Tobacco Use   Smoking Status Never   Smokeless Tobacco Never     Alcohol Consumption:  Social History     Substance and Sexual Activity   Alcohol Use Yes   • Alcohol/week: 2.0 standard drinks   • Types: 2 Glasses of wine per week    Comment: I PER DAY     Fall Risk Screen:    CHARLA Fall Risk Assessment was completed, and patient is at LOW risk for falls.Assessment completed on:11/2/2022    Depression Screening:  PHQ-2/PHQ-9 Depression Screening 11/2/2022   Retired PHQ-9 Total Score -   Retired Total Score -   Little Interest or Pleasure in Doing Things 0-->not at all   Feeling Down, Depressed or Hopeless 0-->not at all   PHQ-9: Brief Depression Severity Measure Score 0       Health Habits and Functional and Cognitive Screening:  Functional & Cognitive Status 11/2/2022   Do you have difficulty preparing food and eating? No   Do you have difficulty bathing yourself, getting dressed or grooming yourself? No   Do you have difficulty using the toilet? No   Do you have difficulty moving around from place to place? No   Do you have trouble with steps or getting out of a bed or a chair?  No   Current Diet Well Balanced Diet   Dental Exam Up to date   Eye Exam Up to date   Exercise (times per week) 2 times per week   Current Exercises Include Walking   Current Exercise Activities Include -   Do you need help using the phone?  No   Are you deaf or do you have serious difficulty hearing?  Yes   Do you need help with transportation? No   Do you need help shopping? No   Do you need help preparing meals?  No   Do you need help with housework?  No   Do you need help with laundry? No   Do you need help taking your medications? No   Do you need help managing money? No   Do you ever drive or ride in a car without wearing a seat belt? No   Have you felt unusual stress, anger or loneliness in the last month? No   Who do you live with? Spouse   If you need help, do you have trouble finding someone available to you? No   Do you have difficulty concentrating, remembering or making decisions? No       Age-appropriate Screening Schedule:  Refer to the list below for future screening recommendations based on patient's age, sex and/or medical conditions. Orders for these recommended tests are listed in the plan section. The patient has been provided with a written plan.    Health Maintenance   Topic Date Due   • DIABETIC FOOT EXAM  10/23/2019   • URINE MICROALBUMIN  06/10/2021   • INFLUENZA VACCINE  08/01/2022   • HEMOGLOBIN A1C  04/26/2023   • DIABETIC EYE EXAM  08/02/2023   • LIPID PANEL  10/26/2023   • TDAP/TD VACCINES (3 - Td or Tdap) 11/08/2029   • ZOSTER VACCINE  Discontinued              Assessment & Plan   CMS Preventative Services Quick Reference  Risk Factors Identified During Encounter  Cardiovascular Disease  Dementia/Memory   Depression/Dysphoria  Fall Risk-High or Moderate  Hearing Problem  Immunizations Discussed/Encouraged (specific Immunizations; Influenza  Inactivity/Sedentary  Obesity/Overweight   Polypharmacy  The above risks/problems have been discussed with the patient.  Follow up actions/plans  if indicated are seen below in the Assessment/Plan Section.  Pertinent information has been shared with the patient in the After Visit Summary.    Diagnoses and all orders for this visit:    1. Medicare annual wellness visit, subsequent (Primary)  -     Comprehensive metabolic panel; Future  -     Hemoglobin A1c; Future  -     TSH; Future  -     Vitamin D 25 hydroxy; Future  -     Lipid panel; Future  -     CBC w AUTO Differential; Future    2. Mixed hyperlipidemia  -     ezetimibe (Zetia) 10 MG tablet; Take 1 tablet by mouth Daily.  Dispense: 90 tablet; Refill: 1  -     fenofibrate 160 MG tablet; Take 1 tablet by mouth Daily.  Dispense: 90 tablet; Refill: 1  -     atorvastatin (LIPITOR) 80 MG tablet; Take 1 tablet by mouth Daily.  Dispense: 90 tablet; Refill: 1  -     Comprehensive metabolic panel; Future  -     Hemoglobin A1c; Future  -     TSH; Future  -     Vitamin D 25 hydroxy; Future  -     Lipid panel; Future  -     CBC w AUTO Differential; Future    3. Type 2 diabetes mellitus without complication, without long-term current use of insulin (HCC)  -     glimepiride (AMARYL) 2 MG tablet; Take 1 tablet by mouth Every Morning Before Breakfast.  Dispense: 90 tablet; Refill: 1  -     metFORMIN (GLUCOPHAGE) 1000 MG tablet; Take 1 tablet by mouth 2 (Two) Times a Day With Meals.  Dispense: 180 tablet; Refill: 1  -     Comprehensive metabolic panel; Future  -     Hemoglobin A1c; Future  -     TSH; Future  -     Vitamin D 25 hydroxy; Future  -     Lipid panel; Future  -     CBC w AUTO Differential; Future    4. Exogenous obesity  -     Comprehensive metabolic panel; Future  -     Hemoglobin A1c; Future  -     TSH; Future  -     Vitamin D 25 hydroxy; Future  -     Lipid panel; Future  -     CBC w AUTO Differential; Future    5. Vitamin D deficiency  -     Comprehensive metabolic panel; Future  -     Hemoglobin A1c; Future  -     TSH; Future  -     Vitamin D 25 hydroxy; Future  -     Lipid panel; Future  -     CBC w AUTO  Differential; Future        Follow Up:   Return in about 6 months (around 5/2/2023) for Recheck.     An After Visit Summary and PPPS were made available to the patient.          I spent 30 minutes caring for Krishna on this date of service. This time includes time spent by me in the following activities:preparing for the visit, reviewing tests, obtaining and/or reviewing a separately obtained history, performing a medically appropriate examination and/or evaluation , counseling and educating the patient/family/caregiver, ordering medications, tests, or procedures, referring and communicating with other health care professionals , documenting information in the medical record, independently interpreting results and communicating that information with the patient/family/caregiver and care coordination

## 2023-04-29 DIAGNOSIS — E11.9 TYPE 2 DIABETES MELLITUS WITHOUT COMPLICATION, WITHOUT LONG-TERM CURRENT USE OF INSULIN: ICD-10-CM

## 2023-04-29 DIAGNOSIS — E78.2 MIXED HYPERLIPIDEMIA: ICD-10-CM

## 2023-05-01 RX ORDER — GLIMEPIRIDE 2 MG/1
TABLET ORAL
Qty: 90 TABLET | Refills: 1 | Status: SHIPPED | OUTPATIENT
Start: 2023-05-01

## 2023-05-01 RX ORDER — EZETIMIBE 10 MG/1
TABLET ORAL
Qty: 90 TABLET | Refills: 1 | Status: SHIPPED | OUTPATIENT
Start: 2023-05-01

## 2023-05-08 DIAGNOSIS — E11.9 TYPE 2 DIABETES MELLITUS WITHOUT COMPLICATION, WITHOUT LONG-TERM CURRENT USE OF INSULIN: Primary | ICD-10-CM

## 2023-05-08 DIAGNOSIS — E55.9 VITAMIN D DEFICIENCY: ICD-10-CM

## 2023-05-08 DIAGNOSIS — E66.09 EXOGENOUS OBESITY: ICD-10-CM

## 2023-05-08 DIAGNOSIS — E11.9 TYPE 2 DIABETES MELLITUS WITHOUT COMPLICATION, WITHOUT LONG-TERM CURRENT USE OF INSULIN: ICD-10-CM

## 2023-05-08 DIAGNOSIS — E78.2 MIXED HYPERLIPIDEMIA: ICD-10-CM

## 2023-05-08 DIAGNOSIS — Z00.00 MEDICARE ANNUAL WELLNESS VISIT, SUBSEQUENT: ICD-10-CM

## 2023-05-10 LAB
25(OH)D3+25(OH)D2 SERPL-MCNC: 76.9 NG/ML (ref 30–100)
ALBUMIN SERPL-MCNC: 4.4 G/DL (ref 3.5–5.2)
ALBUMIN/CREAT UR: 7 MG/G CREAT (ref 0–29)
ALBUMIN/GLOB SERPL: 1.2 G/DL
ALP SERPL-CCNC: 30 U/L (ref 39–117)
ALT SERPL-CCNC: 31 U/L (ref 1–41)
AST SERPL-CCNC: 24 U/L (ref 1–40)
BASOPHILS # BLD AUTO: 0.06 10*3/MM3 (ref 0–0.2)
BASOPHILS NFR BLD AUTO: 1.1 % (ref 0–1.5)
BILIRUB SERPL-MCNC: 0.4 MG/DL (ref 0–1.2)
BUN SERPL-MCNC: 17 MG/DL (ref 8–23)
BUN/CREAT SERPL: 16.5 (ref 7–25)
CALCIUM SERPL-MCNC: 10.4 MG/DL (ref 8.6–10.5)
CHLORIDE SERPL-SCNC: 105 MMOL/L (ref 98–107)
CHOLEST SERPL-MCNC: 125 MG/DL (ref 0–200)
CO2 SERPL-SCNC: 26.3 MMOL/L (ref 22–29)
CREAT SERPL-MCNC: 1.03 MG/DL (ref 0.76–1.27)
CREAT UR-MCNC: 160.5 MG/DL
EGFRCR SERPLBLD CKD-EPI 2021: 76.7 ML/MIN/1.73
EOSINOPHIL # BLD AUTO: 0.2 10*3/MM3 (ref 0–0.4)
EOSINOPHIL NFR BLD AUTO: 3.7 % (ref 0.3–6.2)
ERYTHROCYTE [DISTWIDTH] IN BLOOD BY AUTOMATED COUNT: 13.1 % (ref 12.3–15.4)
GLOBULIN SER CALC-MCNC: 3.6 GM/DL
GLUCOSE SERPL-MCNC: 97 MG/DL (ref 65–99)
HBA1C MFR BLD: 5.6 % (ref 4.8–5.6)
HCT VFR BLD AUTO: 43.3 % (ref 37.5–51)
HDLC SERPL-MCNC: 48 MG/DL (ref 40–60)
HGB BLD-MCNC: 14.3 G/DL (ref 13–17.7)
IMM GRANULOCYTES # BLD AUTO: 0.01 10*3/MM3 (ref 0–0.05)
IMM GRANULOCYTES NFR BLD AUTO: 0.2 % (ref 0–0.5)
LDLC SERPL CALC-MCNC: 65 MG/DL (ref 0–100)
LYMPHOCYTES # BLD AUTO: 1.66 10*3/MM3 (ref 0.7–3.1)
LYMPHOCYTES NFR BLD AUTO: 30.8 % (ref 19.6–45.3)
MCH RBC QN AUTO: 30.2 PG (ref 26.6–33)
MCHC RBC AUTO-ENTMCNC: 33 G/DL (ref 31.5–35.7)
MCV RBC AUTO: 91.5 FL (ref 79–97)
MICROALBUMIN UR-MCNC: 10.8 UG/ML
MONOCYTES # BLD AUTO: 0.58 10*3/MM3 (ref 0.1–0.9)
MONOCYTES NFR BLD AUTO: 10.8 % (ref 5–12)
NEUTROPHILS # BLD AUTO: 2.88 10*3/MM3 (ref 1.7–7)
NEUTROPHILS NFR BLD AUTO: 53.4 % (ref 42.7–76)
NRBC BLD AUTO-RTO: 0 /100 WBC (ref 0–0.2)
PLATELET # BLD AUTO: 282 10*3/MM3 (ref 140–450)
POTASSIUM SERPL-SCNC: 4.7 MMOL/L (ref 3.5–5.2)
PROT SERPL-MCNC: 8 G/DL (ref 6–8.5)
RBC # BLD AUTO: 4.73 10*6/MM3 (ref 4.14–5.8)
SODIUM SERPL-SCNC: 141 MMOL/L (ref 136–145)
TRIGL SERPL-MCNC: 55 MG/DL (ref 0–150)
TSH SERPL DL<=0.005 MIU/L-ACNC: 3.47 UIU/ML (ref 0.27–4.2)
VLDLC SERPL CALC-MCNC: 12 MG/DL (ref 5–40)
WBC # BLD AUTO: 5.39 10*3/MM3 (ref 3.4–10.8)

## 2023-05-17 ENCOUNTER — OFFICE VISIT (OUTPATIENT)
Dept: FAMILY MEDICINE CLINIC | Facility: CLINIC | Age: 73
End: 2023-05-17
Payer: MEDICARE

## 2023-05-17 VITALS
WEIGHT: 227 LBS | HEIGHT: 72 IN | BODY MASS INDEX: 30.75 KG/M2 | DIASTOLIC BLOOD PRESSURE: 66 MMHG | SYSTOLIC BLOOD PRESSURE: 102 MMHG | HEART RATE: 86 BPM | TEMPERATURE: 98 F | OXYGEN SATURATION: 97 %

## 2023-05-17 DIAGNOSIS — E11.9 TYPE 2 DIABETES MELLITUS WITHOUT COMPLICATION, WITHOUT LONG-TERM CURRENT USE OF INSULIN: Primary | ICD-10-CM

## 2023-05-17 DIAGNOSIS — E78.2 MIXED HYPERLIPIDEMIA: ICD-10-CM

## 2023-05-17 DIAGNOSIS — E66.09 EXOGENOUS OBESITY: ICD-10-CM

## 2023-05-17 DIAGNOSIS — E55.9 VITAMIN D DEFICIENCY: ICD-10-CM

## 2023-05-17 DIAGNOSIS — R97.20 ELEVATED PSA: ICD-10-CM

## 2023-05-17 RX ORDER — ATORVASTATIN CALCIUM 80 MG/1
80 TABLET, FILM COATED ORAL DAILY
Qty: 90 TABLET | Refills: 1 | Status: SHIPPED | OUTPATIENT
Start: 2023-05-17

## 2023-05-17 RX ORDER — FENOFIBRATE 160 MG/1
160 TABLET ORAL DAILY
Qty: 90 TABLET | Refills: 1 | Status: SHIPPED | OUTPATIENT
Start: 2023-05-17

## 2023-05-18 NOTE — PROGRESS NOTES
Subjective   Krishna Le is a 72 y.o. male with   Chief Complaint   Patient presents with   • Diabetes   • Hyperlipidemia   .    History of Present Illness   72-year-old white male with known history of hyperlipidemia and type II non-insulin-dependent diabetes mellitus here for further medical management.  Current medications include atorvastatin at 80 mg daily in combination with Zetia 10 mg daily and fenofibrate at 160 mg daily.  Patient is also using glimepiride at 2 mg daily as well as metformin at 1000 mg twice daily.  Patient does use a host of supplements and vitamins including vitamin D3.  He is also using over-the-counter allergy medicines during his allergy season such as Zyrtec.  All medications and supplements are used appropriately and are well-tolerated without side effects.  Fasting labs have been acquired prior to this visit.      The following portions of the patient's history were reviewed and updated as appropriate: allergies, current medications, past family history, past medical history, past social history, past surgical history and problem list.    Review of Systems   Cardiovascular:        Hyperlipidemia   Endocrine:        Exogenous obesity, vitamin D deficiency, type II non-insulin-dependent diabetes mellitus       Objective     Vitals:    05/17/23 0925   BP: 102/66   Pulse: 86   Temp: 98 °F (36.7 °C)   SpO2: 97%       Recent Results (from the past 672 hour(s))   Microalbumin / Creatinine Urine Ratio - Urine, Clean Catch    Collection Time: 05/09/23  8:11 AM    Specimen: Urine, Clean Catch   Result Value Ref Range    Creatinine, Urine 160.5 Not Estab. mg/dL    Microalbumin, Urine 10.8 Not Estab. ug/mL    Microalbumin/Creatinine Ratio 7 0 - 29 mg/g creat   CBC w AUTO Differential    Collection Time: 05/09/23  8:11 AM    Specimen: Blood   Result Value Ref Range    WBC 5.39 3.40 - 10.80 10*3/mm3    RBC 4.73 4.14 - 5.80 10*6/mm3    Hemoglobin 14.3 13.0 - 17.7 g/dL    Hematocrit 43.3 37.5  - 51.0 %    MCV 91.5 79.0 - 97.0 fL    MCH 30.2 26.6 - 33.0 pg    MCHC 33.0 31.5 - 35.7 g/dL    RDW 13.1 12.3 - 15.4 %    Platelets 282 140 - 450 10*3/mm3    Neutrophil Rel % 53.4 42.7 - 76.0 %    Lymphocyte Rel % 30.8 19.6 - 45.3 %    Monocyte Rel % 10.8 5.0 - 12.0 %    Eosinophil Rel % 3.7 0.3 - 6.2 %    Basophil Rel % 1.1 0.0 - 1.5 %    Neutrophils Absolute 2.88 1.70 - 7.00 10*3/mm3    Lymphocytes Absolute 1.66 0.70 - 3.10 10*3/mm3    Monocytes Absolute 0.58 0.10 - 0.90 10*3/mm3    Eosinophils Absolute 0.20 0.00 - 0.40 10*3/mm3    Basophils Absolute 0.06 0.00 - 0.20 10*3/mm3    Immature Granulocyte Rel % 0.2 0.0 - 0.5 %    Immature Grans Absolute 0.01 0.00 - 0.05 10*3/mm3    nRBC 0.0 0.0 - 0.2 /100 WBC   Lipid panel    Collection Time: 05/09/23  8:11 AM    Specimen: Blood   Result Value Ref Range    Total Cholesterol 125 0 - 200 mg/dL    Triglycerides 55 0 - 150 mg/dL    HDL Cholesterol 48 40 - 60 mg/dL    VLDL Cholesterol Uzair 12 5 - 40 mg/dL    LDL Chol Calc (NI[facility]) 65 0 - 100 mg/dL   Vitamin D 25 hydroxy    Collection Time: 05/09/23  8:11 AM    Specimen: Blood   Result Value Ref Range    25 Hydroxy, Vitamin D 76.9 30.0 - 100.0 ng/ml   TSH    Collection Time: 05/09/23  8:11 AM    Specimen: Blood   Result Value Ref Range    TSH 3.470 0.270 - 4.200 uIU/mL   Hemoglobin A1c    Collection Time: 05/09/23  8:11 AM    Specimen: Blood   Result Value Ref Range    Hemoglobin A1C 5.60 4.80 - 5.60 %   Comprehensive metabolic panel    Collection Time: 05/09/23  8:11 AM    Specimen: Blood   Result Value Ref Range    Glucose 97 65 - 99 mg/dL    BUN 17 8 - 23 mg/dL    Creatinine 1.03 0.76 - 1.27 mg/dL    EGFR Result 76.7 >60.0 mL/min/1.73    BUN/Creatinine Ratio 16.5 7.0 - 25.0    Sodium 141 136 - 145 mmol/L    Potassium 4.7 3.5 - 5.2 mmol/L    Chloride 105 98 - 107 mmol/L    Total CO2 26.3 22.0 - 29.0 mmol/L    Calcium 10.4 8.6 - 10.5 mg/dL    Total Protein 8.0 6.0 - 8.5 g/dL    Albumin 4.4 3.5 - 5.2 g/dL    Globulin 3.6 gm/dL     A/G Ratio 1.2 g/dL    Total Bilirubin 0.4 0.0 - 1.2 mg/dL    Alkaline Phosphatase 30 (L) 39 - 117 U/L    AST (SGOT) 24 1 - 40 U/L    ALT (SGPT) 31 1 - 41 U/L       Physical Exam  Vitals and nursing note reviewed.   Constitutional:       Appearance: Normal appearance. He is well-developed and well-groomed. He is obese.      Comments: Exogenous obesity with a BMI of 30.8   HENT:      Head: Normocephalic and atraumatic.   Neck:      Thyroid: No thyroid mass or thyromegaly.      Vascular: Normal carotid pulses. No carotid bruit.      Trachea: Trachea and phonation normal.   Cardiovascular:      Rate and Rhythm: Normal rate and regular rhythm.      Heart sounds: Normal heart sounds. No murmur heard.    No friction rub. No gallop.   Pulmonary:      Effort: Pulmonary effort is normal. No respiratory distress.      Breath sounds: Normal breath sounds. No decreased breath sounds, wheezing, rhonchi or rales.   Musculoskeletal:      Cervical back: Neck supple.   Lymphadenopathy:      Cervical: No cervical adenopathy.   Skin:     General: Skin is warm and dry.      Findings: No rash.   Neurological:      Mental Status: He is alert and oriented to person, place, and time.   Psychiatric:         Attention and Perception: Attention and perception normal.         Mood and Affect: Mood and affect normal.         Speech: Speech normal.         Behavior: Behavior normal. Behavior is cooperative.         Thought Content: Thought content normal.         Cognition and Memory: Cognition and memory normal.         Judgment: Judgment normal.     Patient's (Body mass index is 30.79 kg/m².) indicates that they are obese (BMI >30) with health related conditions that include obstructive sleep apnea, hypertension, coronary heart disease, diabetes mellitus, dyslipidemias, GERD, peripheral vascular disease, idiopathic intracranial hypertension, osteoarthritis, lower extremity venous stasis disease, urinary stress incontinence and peripheral  vascular disease . Weight is unchanged. BMI is is above average; BMI management plan is completed. We discussed portion control and increasing exercise.     Assessment & Plan   Diagnoses and all orders for this visit:    1. Type 2 diabetes mellitus without complication, without long-term current use of insulin (Primary)  -     Comprehensive metabolic panel; Future  -     Lipid panel; Future  -     PSA DIAGNOSTIC ONLY; Future  -     Hemoglobin A1c; Future  -     TSH; Future  -     Vitamin D 25 hydroxy; Future  -     CBC w AUTO Differential; Future    2. Vitamin D deficiency  -     Comprehensive metabolic panel; Future  -     Lipid panel; Future  -     PSA DIAGNOSTIC ONLY; Future  -     Hemoglobin A1c; Future  -     TSH; Future  -     Vitamin D 25 hydroxy; Future  -     CBC w AUTO Differential; Future    3. Mixed hyperlipidemia  -     fenofibrate 160 MG tablet; Take 1 tablet by mouth Daily.  Dispense: 90 tablet; Refill: 1  -     atorvastatin (LIPITOR) 80 MG tablet; Take 1 tablet by mouth Daily.  Dispense: 90 tablet; Refill: 1  -     Comprehensive metabolic panel; Future  -     Lipid panel; Future  -     PSA DIAGNOSTIC ONLY; Future  -     Hemoglobin A1c; Future  -     TSH; Future  -     Vitamin D 25 hydroxy; Future  -     CBC w AUTO Differential; Future    4. Exogenous obesity  -     Comprehensive metabolic panel; Future  -     Lipid panel; Future  -     PSA DIAGNOSTIC ONLY; Future  -     Hemoglobin A1c; Future  -     TSH; Future  -     Vitamin D 25 hydroxy; Future  -     CBC w AUTO Differential; Future    5. Elevated PSA  -     PSA DIAGNOSTIC ONLY; Future        Return in about 6 months (around 11/17/2023) for Recheck.

## 2023-09-14 ENCOUNTER — PRE-ADMISSION TESTING (OUTPATIENT)
Dept: PREADMISSION TESTING | Facility: HOSPITAL | Age: 73
End: 2023-09-14
Payer: MEDICARE

## 2023-09-14 VITALS
WEIGHT: 226.08 LBS | HEIGHT: 72 IN | SYSTOLIC BLOOD PRESSURE: 141 MMHG | OXYGEN SATURATION: 95 % | DIASTOLIC BLOOD PRESSURE: 89 MMHG | HEART RATE: 85 BPM | BODY MASS INDEX: 30.62 KG/M2 | RESPIRATION RATE: 16 BRPM

## 2023-09-14 LAB
ALBUMIN SERPL-MCNC: 4.2 G/DL (ref 3.5–5.2)
ALBUMIN/GLOB SERPL: 1 G/DL
ALP SERPL-CCNC: 35 U/L (ref 39–117)
ALT SERPL W P-5'-P-CCNC: 39 U/L (ref 1–41)
ANION GAP SERPL CALCULATED.3IONS-SCNC: 11.3 MMOL/L (ref 5–15)
AST SERPL-CCNC: 29 U/L (ref 1–40)
BASOPHILS # BLD AUTO: 0.06 10*3/MM3 (ref 0–0.2)
BASOPHILS NFR BLD AUTO: 1 % (ref 0–1.5)
BILIRUB SERPL-MCNC: 0.5 MG/DL (ref 0–1.2)
BUN SERPL-MCNC: 21 MG/DL (ref 8–23)
BUN/CREAT SERPL: 20.6 (ref 7–25)
CALCIUM SPEC-SCNC: 9.9 MG/DL (ref 8.6–10.5)
CHLORIDE SERPL-SCNC: 101 MMOL/L (ref 98–107)
CO2 SERPL-SCNC: 21.7 MMOL/L (ref 22–29)
CREAT SERPL-MCNC: 1.02 MG/DL (ref 0.76–1.27)
DEPRECATED RDW RBC AUTO: 47.8 FL (ref 37–54)
EGFRCR SERPLBLD CKD-EPI 2021: 77.1 ML/MIN/1.73
EOSINOPHIL # BLD AUTO: 0.17 10*3/MM3 (ref 0–0.4)
EOSINOPHIL NFR BLD AUTO: 2.8 % (ref 0.3–6.2)
ERYTHROCYTE [DISTWIDTH] IN BLOOD BY AUTOMATED COUNT: 14.1 % (ref 12.3–15.4)
GLOBULIN UR ELPH-MCNC: 4.1 GM/DL
GLUCOSE SERPL-MCNC: 119 MG/DL (ref 65–99)
HCT VFR BLD AUTO: 44.4 % (ref 37.5–51)
HGB BLD-MCNC: 14.7 G/DL (ref 13–17.7)
IMM GRANULOCYTES # BLD AUTO: 0.02 10*3/MM3 (ref 0–0.05)
IMM GRANULOCYTES NFR BLD AUTO: 0.3 % (ref 0–0.5)
LYMPHOCYTES # BLD AUTO: 1.36 10*3/MM3 (ref 0.7–3.1)
LYMPHOCYTES NFR BLD AUTO: 22.8 % (ref 19.6–45.3)
MCH RBC QN AUTO: 30.7 PG (ref 26.6–33)
MCHC RBC AUTO-ENTMCNC: 33.1 G/DL (ref 31.5–35.7)
MCV RBC AUTO: 92.7 FL (ref 79–97)
MONOCYTES # BLD AUTO: 0.61 10*3/MM3 (ref 0.1–0.9)
MONOCYTES NFR BLD AUTO: 10.2 % (ref 5–12)
NEUTROPHILS NFR BLD AUTO: 3.75 10*3/MM3 (ref 1.7–7)
NEUTROPHILS NFR BLD AUTO: 62.9 % (ref 42.7–76)
NRBC BLD AUTO-RTO: 0 /100 WBC (ref 0–0.2)
PLATELET # BLD AUTO: 265 10*3/MM3 (ref 140–450)
PMV BLD AUTO: 10.1 FL (ref 6–12)
POTASSIUM SERPL-SCNC: 4.1 MMOL/L (ref 3.5–5.2)
PROT SERPL-MCNC: 8.3 G/DL (ref 6–8.5)
RBC # BLD AUTO: 4.79 10*6/MM3 (ref 4.14–5.8)
SODIUM SERPL-SCNC: 134 MMOL/L (ref 136–145)
WBC NRBC COR # BLD: 5.97 10*3/MM3 (ref 3.4–10.8)

## 2023-09-14 PROCEDURE — 36415 COLL VENOUS BLD VENIPUNCTURE: CPT

## 2023-09-14 PROCEDURE — 93005 ELECTROCARDIOGRAM TRACING: CPT

## 2023-09-14 PROCEDURE — 93010 ELECTROCARDIOGRAM REPORT: CPT | Performed by: INTERNAL MEDICINE

## 2023-09-14 PROCEDURE — 80053 COMPREHEN METABOLIC PANEL: CPT | Performed by: UROLOGY

## 2023-09-14 PROCEDURE — 85025 COMPLETE CBC W/AUTO DIFF WBC: CPT | Performed by: UROLOGY

## 2023-09-14 NOTE — DISCHARGE INSTRUCTIONS
PRE-ADMISSION TESTING INSTRUCTIONS FOR ADULTS    Take these medications the morning of surgery with a small sip of water: cetirizine, lipitor, zetia, and fenofibrate      Do not take any insulin or diabetes medications the morning of surgery.      No aspirin, advil, aleve, ibuprofen, naproxen, diet pills, decongestants, or herbal/vitamins for a week prior to surgery.       Tylenol/Acetaminophen is okay to take if needed.    General Instructions:    DO NOT EAT SOLID FOOD AFTER MIDNIGHT THE NIGHT BEFORE SURGERY. No gum, mints, or hard candy after midnight the night before surgery.  You may drink clear liquids the day of surgery up until 2 hours before your arrival time.  Clear liquids are liquids you can see through. Nothing RED in color.    Plain water    Sports drinks      Gelatin (Jell-O)  Fruit juices without pulp such as white grape juice and apple juice  Popsicles that contain no fruit or yogurt  Tea or coffee (no cream or milk added)    It is beneficial for you to have a clear drink that contains carbohydrates 2 hours before your arrival time.  We suggest a 20 ounce bottle of Gatorade or Powerade for non-diabetic patients or a 20 ounce bottle of Gatorade Zero or Powerade Zero for diabetic patients.     Patients who avoid smoking, chewing tobacco and alcohol for 4 weeks prior to surgery have a reduced risk of post-operative complications.  If at all possible, quit smoking as many days before surgery as you can.    Do not smoke, use chewing tobacco or drink alcohol the day of surgery    Bring your C-PAP/ BI-PAP machine if you use one.  Wear clean comfortable clothes.  Do not wear contact lenses, lotion, deodorant, or make-up.  Bring a case for your glasses if applicable. You may brush your teeth the morning of surgery.  You may wear dentures/partials, do not put adhesive/glue on them.  Leave all other jewelry and valuables at home.      Preventing a Surgical Site Infection:    Shower the night before and on the  morning of surgery using the chlorhexidine soap you were given.  Use a clean washcloth with the soap.  Place clean sheets on your bed after showering the night before surgery. Do not use the CHG soap on your hair, face, or private areas. Wash your body gently for five (5) minutes. Do not scrub your skin.  Dry with a clean towel and dress in clean clothing.  Do not shave the surgical area for 10 days-2 weeks prior to surgery  because the razor can irritate skin and make it easier to develop an infection.  Make sure you, your family, and all healthcare providers clean their hands with soap and water or an alcohol based hand  before caring for you or your wound.      Day of surgery:    Your surgeon’s office will advise you of your arrival time for the day of surgery.    Upon arrival, a Pre-op nurse and Anesthesia provider will review your health history, obtain vital signs, and answer questions you may have. The anesthesia provider will also discuss the type of anesthesia that will be needed for your procedure, which may include general anesthesia. The only belongings needed at this time will be your home medications and if applicable your C-PAP/BI-PAP machine.  If you are staying overnight your family can leave the rest of your belongings in the car and bring them to your room later.  A Pre-op nurse will start an IV and you may receive medication in preparation for surgery, including something to help you relax.  Your family will be able to see you in the Pre-op area.  While you are in surgery your family should notify the waiting room  if they leave the waiting room area and provide a contact phone number.    IF you have any questions, you can call the Pre-Admission Department at [phone] or your surgeon's office.  Notify your surgeon if  you become sick, have a fever, productive cough, or cannot be here the day of surgery    Please be aware that surgery does come with discomfort.  We want  to make every effort to control your discomfort so please discuss any uncontrolled symptoms with your nurse.   Your doctor will most likely have prescribed pain medications.      If you are going home after surgery, you will receive individualized written care instructions before being discharged.  A responsible adult (over the age of 18) must drive you to and from the hospital on the day of your surgery and stay with you for 24 hours after anesthesia.    If you are staying overnight following surgery, you will be transported to your hospital room following the recovery period.  [facility] has all private rooms.    You may receive a survey regarding the care you received. Your feedback is very important and will be used to collect the necessary data to help us to continue to provide excellent care.     Deductibles and co-payments are collected on the day of service. Please be prepared to pay the required co-pay, deductible or deposit on the day of service as defined by your plan.  HIFU Pre-Operative Instruction      The day before your procedure:    When you wake up start a Clear Liquid diet such as Jell-O, broth, tea, juice and coffee. (no milk or creamer)  It is advised to abstain from alcohol or carbonated beverages the day prior to your procedure.  Nothing to eat or drink after midnight.    Medications:   Start ALL medications e-prescribed (Flomax, Vesicare and Antibiotic) by your doctor the morning of your procedure.   Any regular medication can be taken the morning of the procedure with a sip of water.  Stop all aspirin or blood thinning agents 5 days prior to treatment.  Please consult your prescribing physician prior to stopping your medication.  Stop all supplements 10 days prior to treatment.    The morning of your procedure:  Two hours prior to arriving to the hospital for your procedure you will need to administer 2 fleets enemas.  You should repeat this process using warm water until there  is no stool and only clear fluid coming out.  Wear loose fitting clothing, your catheter bag will be fastened to your upper thigh.  Adult diaper - some patients have reported leakage around the catheter site or from the penis directly after the procedure.  This will keep you from soiling your clothing.      Post HIFU Treatment:  You should rest for at least an hour after returning to the hotel/home from your procedure. Do not overdo it physically but do walk around.   Do not lift anything over 15-20lbs while you have the catheter in your bladder and avoid prolonged sitting.   You can resume a normal diet as soon as you feel like eating, although we do suggest bland foods for your first meal.  Start Ibuprofen 600mg twice a day for 10 days after HIFU to help with inflammation in the prostate.

## 2023-09-14 NOTE — PAT
Pt here for PAT visit.  Pre-op tests completed, chg soap given, and instructions reviewed.  Instructed clears until 2 hrs prior to arrival time, voiced understanding. Suprapubic catheter hands on education provided for the patient, patient is aware and in agreement with his care plan.

## 2023-09-15 LAB
QT INTERVAL: 398 MS
QTC INTERVAL: 444 MS

## 2023-09-20 ENCOUNTER — ANESTHESIA EVENT (OUTPATIENT)
Dept: PERIOP | Facility: HOSPITAL | Age: 73
End: 2023-09-20
Payer: MEDICARE

## 2023-09-22 ENCOUNTER — HOSPITAL ENCOUNTER (OUTPATIENT)
Facility: HOSPITAL | Age: 73
Setting detail: HOSPITAL OUTPATIENT SURGERY
Discharge: HOME | End: 2023-09-22
Attending: UROLOGY | Admitting: UROLOGY
Payer: MEDICARE

## 2023-09-22 ENCOUNTER — ANESTHESIA (OUTPATIENT)
Dept: PERIOP | Facility: HOSPITAL | Age: 73
End: 2023-09-22
Payer: MEDICARE

## 2023-09-22 VITALS
BODY MASS INDEX: 30.87 KG/M2 | TEMPERATURE: 98 F | WEIGHT: 227.9 LBS | SYSTOLIC BLOOD PRESSURE: 141 MMHG | HEIGHT: 72 IN | RESPIRATION RATE: 17 BRPM | HEART RATE: 86 BPM | DIASTOLIC BLOOD PRESSURE: 81 MMHG | OXYGEN SATURATION: 94 %

## 2023-09-22 LAB — GLUCOSE BLDC GLUCOMTR-MCNC: 88 MG/DL (ref 70–130)

## 2023-09-22 PROCEDURE — C1889 IMPLANT/INSERT DEVICE, NOC: HCPCS | Performed by: UROLOGY

## 2023-09-22 PROCEDURE — 25010000002 KETOROLAC TROMETHAMINE PER 15 MG: Performed by: ANESTHESIOLOGY

## 2023-09-22 PROCEDURE — 0 LIDOCAINE 1 % SOLUTION: Performed by: UROLOGY

## 2023-09-22 PROCEDURE — 25010000002 PROPOFOL 200 MG/20ML EMULSION: Performed by: ANESTHESIOLOGY

## 2023-09-22 PROCEDURE — 25010000002 NEOSTIGMINE 10 MG/10ML SOLUTION: Performed by: ANESTHESIOLOGY

## 2023-09-22 PROCEDURE — 25010000002 MAGNESIUM SULFATE 2 GM/50ML SOLUTION: Performed by: ANESTHESIOLOGY

## 2023-09-22 PROCEDURE — 0 CEFAZOLIN SODIUM-DEXTROSE 2-3 GM-%(50ML) RECONSTITUTED SOLUTION: Performed by: UROLOGY

## 2023-09-22 PROCEDURE — 25010000002 PHENYLEPHRINE 10 MG/ML SOLUTION: Performed by: ANESTHESIOLOGY

## 2023-09-22 PROCEDURE — 25010000002 SUCCINYLCHOLINE PER 20 MG: Performed by: ANESTHESIOLOGY

## 2023-09-22 PROCEDURE — 25010000002 FENTANYL CITRATE (PF) 50 MCG/ML SOLUTION: Performed by: ANESTHESIOLOGY

## 2023-09-22 PROCEDURE — 25010000002 DEXAMETHASONE PER 1 MG: Performed by: NURSE ANESTHETIST, CERTIFIED REGISTERED

## 2023-09-22 PROCEDURE — 25010000002 ONDANSETRON PER 1 MG: Performed by: NURSE ANESTHETIST, CERTIFIED REGISTERED

## 2023-09-22 PROCEDURE — C1894 INTRO/SHEATH, NON-LASER: HCPCS | Performed by: UROLOGY

## 2023-09-22 PROCEDURE — 82948 REAGENT STRIP/BLOOD GLUCOSE: CPT

## 2023-09-22 RX ORDER — SODIUM CHLORIDE 9 MG/ML
40 INJECTION, SOLUTION INTRAVENOUS AS NEEDED
Status: DISCONTINUED | OUTPATIENT
Start: 2023-09-22 | End: 2023-09-22 | Stop reason: HOSPADM

## 2023-09-22 RX ORDER — SODIUM CHLORIDE, SODIUM LACTATE, POTASSIUM CHLORIDE, CALCIUM CHLORIDE 600; 310; 30; 20 MG/100ML; MG/100ML; MG/100ML; MG/100ML
100 INJECTION, SOLUTION INTRAVENOUS CONTINUOUS
Status: DISCONTINUED | OUTPATIENT
Start: 2023-09-22 | End: 2023-09-22 | Stop reason: HOSPADM

## 2023-09-22 RX ORDER — PROPOFOL 10 MG/ML
INJECTION, EMULSION INTRAVENOUS AS NEEDED
Status: DISCONTINUED | OUTPATIENT
Start: 2023-09-22 | End: 2023-09-22 | Stop reason: SURG

## 2023-09-22 RX ORDER — SODIUM CHLORIDE 0.9 % (FLUSH) 0.9 %
10 SYRINGE (ML) INJECTION EVERY 12 HOURS SCHEDULED
Status: DISCONTINUED | OUTPATIENT
Start: 2023-09-22 | End: 2023-09-22 | Stop reason: HOSPADM

## 2023-09-22 RX ORDER — LIDOCAINE HYDROCHLORIDE 10 MG/ML
0.5 INJECTION, SOLUTION INFILTRATION; PERINEURAL ONCE AS NEEDED
Status: DISCONTINUED | OUTPATIENT
Start: 2023-09-22 | End: 2023-09-22 | Stop reason: HOSPADM

## 2023-09-22 RX ORDER — NEOSTIGMINE METHYLSULFATE 1 MG/ML
INJECTION, SOLUTION INTRAVENOUS AS NEEDED
Status: DISCONTINUED | OUTPATIENT
Start: 2023-09-22 | End: 2023-09-22 | Stop reason: SURG

## 2023-09-22 RX ORDER — SODIUM CHLORIDE 0.9 % (FLUSH) 0.9 %
10 SYRINGE (ML) INJECTION AS NEEDED
Status: DISCONTINUED | OUTPATIENT
Start: 2023-09-22 | End: 2023-09-22 | Stop reason: HOSPADM

## 2023-09-22 RX ORDER — ROCURONIUM BROMIDE 10 MG/ML
INJECTION, SOLUTION INTRAVENOUS AS NEEDED
Status: DISCONTINUED | OUTPATIENT
Start: 2023-09-22 | End: 2023-09-22 | Stop reason: SURG

## 2023-09-22 RX ORDER — MIDAZOLAM HYDROCHLORIDE 2 MG/2ML
1 INJECTION, SOLUTION INTRAMUSCULAR; INTRAVENOUS
Status: DISCONTINUED | OUTPATIENT
Start: 2023-09-22 | End: 2023-09-22 | Stop reason: HOSPADM

## 2023-09-22 RX ORDER — SUCCINYLCHOLINE CHLORIDE 20 MG/ML
INJECTION INTRAMUSCULAR; INTRAVENOUS AS NEEDED
Status: DISCONTINUED | OUTPATIENT
Start: 2023-09-22 | End: 2023-09-22 | Stop reason: SURG

## 2023-09-22 RX ORDER — PHENYLEPHRINE HYDROCHLORIDE 10 MG/ML
INJECTION INTRAVENOUS AS NEEDED
Status: DISCONTINUED | OUTPATIENT
Start: 2023-09-22 | End: 2023-09-22 | Stop reason: SURG

## 2023-09-22 RX ORDER — FAMOTIDINE 10 MG/ML
20 INJECTION, SOLUTION INTRAVENOUS
Status: COMPLETED | OUTPATIENT
Start: 2023-09-22 | End: 2023-09-22

## 2023-09-22 RX ORDER — ONDANSETRON 2 MG/ML
4 INJECTION INTRAMUSCULAR; INTRAVENOUS ONCE AS NEEDED
Status: COMPLETED | OUTPATIENT
Start: 2023-09-22 | End: 2023-09-22

## 2023-09-22 RX ORDER — MAGNESIUM HYDROXIDE 1200 MG/15ML
LIQUID ORAL AS NEEDED
Status: DISCONTINUED | OUTPATIENT
Start: 2023-09-22 | End: 2023-09-22 | Stop reason: HOSPADM

## 2023-09-22 RX ORDER — ONDANSETRON 2 MG/ML
4 INJECTION INTRAMUSCULAR; INTRAVENOUS ONCE AS NEEDED
Status: DISCONTINUED | OUTPATIENT
Start: 2023-09-22 | End: 2023-09-22 | Stop reason: HOSPADM

## 2023-09-22 RX ORDER — EPHEDRINE SULFATE 50 MG/ML
INJECTION INTRAVENOUS AS NEEDED
Status: DISCONTINUED | OUTPATIENT
Start: 2023-09-22 | End: 2023-09-22 | Stop reason: SURG

## 2023-09-22 RX ORDER — LIDOCAINE HYDROCHLORIDE 20 MG/ML
INJECTION, SOLUTION INFILTRATION; PERINEURAL AS NEEDED
Status: DISCONTINUED | OUTPATIENT
Start: 2023-09-22 | End: 2023-09-22 | Stop reason: SURG

## 2023-09-22 RX ORDER — PHENAZOPYRIDINE HYDROCHLORIDE 100 MG/1
200 TABLET, FILM COATED ORAL ONCE
Status: COMPLETED | OUTPATIENT
Start: 2023-09-22 | End: 2023-09-22

## 2023-09-22 RX ORDER — CEFAZOLIN SODIUM 2 G/50ML
2000 SOLUTION INTRAVENOUS ONCE
Status: COMPLETED | OUTPATIENT
Start: 2023-09-22 | End: 2023-09-22

## 2023-09-22 RX ORDER — KETOROLAC TROMETHAMINE 30 MG/ML
INJECTION, SOLUTION INTRAMUSCULAR; INTRAVENOUS AS NEEDED
Status: DISCONTINUED | OUTPATIENT
Start: 2023-09-22 | End: 2023-09-22 | Stop reason: SURG

## 2023-09-22 RX ORDER — OXYCODONE HYDROCHLORIDE AND ACETAMINOPHEN 5; 325 MG/1; MG/1
1 TABLET ORAL ONCE AS NEEDED
Status: COMPLETED | OUTPATIENT
Start: 2023-09-22 | End: 2023-09-22

## 2023-09-22 RX ORDER — FENTANYL CITRATE 50 UG/ML
INJECTION, SOLUTION INTRAMUSCULAR; INTRAVENOUS AS NEEDED
Status: DISCONTINUED | OUTPATIENT
Start: 2023-09-22 | End: 2023-09-22 | Stop reason: SURG

## 2023-09-22 RX ORDER — SODIUM CHLORIDE, SODIUM LACTATE, POTASSIUM CHLORIDE, CALCIUM CHLORIDE 600; 310; 30; 20 MG/100ML; MG/100ML; MG/100ML; MG/100ML
9 INJECTION, SOLUTION INTRAVENOUS CONTINUOUS
Status: DISCONTINUED | OUTPATIENT
Start: 2023-09-22 | End: 2023-09-22 | Stop reason: HOSPADM

## 2023-09-22 RX ORDER — MAGNESIUM SULFATE HEPTAHYDRATE 40 MG/ML
INJECTION, SOLUTION INTRAVENOUS AS NEEDED
Status: DISCONTINUED | OUTPATIENT
Start: 2023-09-22 | End: 2023-09-22 | Stop reason: SURG

## 2023-09-22 RX ORDER — GLYCOPYRROLATE 0.2 MG/ML
INJECTION INTRAMUSCULAR; INTRAVENOUS AS NEEDED
Status: DISCONTINUED | OUTPATIENT
Start: 2023-09-22 | End: 2023-09-22 | Stop reason: SURG

## 2023-09-22 RX ORDER — DEXAMETHASONE SODIUM PHOSPHATE 4 MG/ML
4 INJECTION, SOLUTION INTRA-ARTICULAR; INTRALESIONAL; INTRAMUSCULAR; INTRAVENOUS; SOFT TISSUE ONCE AS NEEDED
Status: COMPLETED | OUTPATIENT
Start: 2023-09-22 | End: 2023-09-22

## 2023-09-22 RX ORDER — OXYCODONE AND ACETAMINOPHEN 7.5; 325 MG/1; MG/1
1 TABLET ORAL ONCE AS NEEDED
Status: DISCONTINUED | OUTPATIENT
Start: 2023-09-22 | End: 2023-09-22 | Stop reason: HOSPADM

## 2023-09-22 RX ORDER — LIDOCAINE HYDROCHLORIDE 10 MG/ML
INJECTION, SOLUTION INFILTRATION; PERINEURAL AS NEEDED
Status: DISCONTINUED | OUTPATIENT
Start: 2023-09-22 | End: 2023-09-22 | Stop reason: HOSPADM

## 2023-09-22 RX ORDER — KETAMINE HYDROCHLORIDE 10 MG/ML
INJECTION INTRAMUSCULAR; INTRAVENOUS AS NEEDED
Status: DISCONTINUED | OUTPATIENT
Start: 2023-09-22 | End: 2023-09-22 | Stop reason: SURG

## 2023-09-22 RX ADMIN — PHENYLEPHRINE HYDROCHLORIDE 50 MCG: 10 INJECTION INTRAVENOUS at 08:44

## 2023-09-22 RX ADMIN — ROCURONIUM BROMIDE 5 MG: 10 INJECTION INTRAVENOUS at 08:04

## 2023-09-22 RX ADMIN — ROCURONIUM BROMIDE 10 MG: 10 INJECTION INTRAVENOUS at 08:52

## 2023-09-22 RX ADMIN — PHENAZOPYRIDINE 200 MG: 100 TABLET ORAL at 10:41

## 2023-09-22 RX ADMIN — SODIUM CHLORIDE, POTASSIUM CHLORIDE, SODIUM LACTATE AND CALCIUM CHLORIDE 9 ML/HR: 600; 310; 30; 20 INJECTION, SOLUTION INTRAVENOUS at 06:42

## 2023-09-22 RX ADMIN — PHENYLEPHRINE HYDROCHLORIDE 50 MCG: 10 INJECTION INTRAVENOUS at 08:52

## 2023-09-22 RX ADMIN — FAMOTIDINE 20 MG: 10 INJECTION, SOLUTION INTRAVENOUS at 06:43

## 2023-09-22 RX ADMIN — PHENYLEPHRINE HYDROCHLORIDE 100 MCG: 10 INJECTION INTRAVENOUS at 08:19

## 2023-09-22 RX ADMIN — MAGNESIUM SULFATE HEPTAHYDRATE 1 G/HR: 2 INJECTION, SOLUTION INTRAVENOUS at 07:45

## 2023-09-22 RX ADMIN — FENTANYL CITRATE 50 MCG: 50 INJECTION INTRAMUSCULAR; INTRAVENOUS at 07:39

## 2023-09-22 RX ADMIN — ROCURONIUM BROMIDE 10 MG: 10 INJECTION INTRAVENOUS at 08:23

## 2023-09-22 RX ADMIN — OXYCODONE HYDROCHLORIDE AND ACETAMINOPHEN 1 TABLET: 5; 325 TABLET ORAL at 11:37

## 2023-09-22 RX ADMIN — PHENYLEPHRINE HYDROCHLORIDE 100 MCG: 10 INJECTION INTRAVENOUS at 08:00

## 2023-09-22 RX ADMIN — LIDOCAINE HYDROCHLORIDE 100 MG: 20 INJECTION, SOLUTION INFILTRATION; PERINEURAL at 07:38

## 2023-09-22 RX ADMIN — GLYCOPYRROLATE 0.3 MG: 0.2 INJECTION INTRAMUSCULAR; INTRAVENOUS at 09:59

## 2023-09-22 RX ADMIN — NEOSTIGMINE METHYLSULFATE 3 MG: 0.5 INJECTION INTRAVENOUS at 09:59

## 2023-09-22 RX ADMIN — SUCCINYLCHOLINE CHLORIDE 140 MG: 20 INJECTION, SOLUTION INTRAMUSCULAR; INTRAVENOUS at 07:39

## 2023-09-22 RX ADMIN — KETOROLAC TROMETHAMINE 30 MG: 30 INJECTION, SOLUTION INTRAMUSCULAR; INTRAVENOUS at 09:55

## 2023-09-22 RX ADMIN — PHENYLEPHRINE HYDROCHLORIDE 100 MCG: 10 INJECTION INTRAVENOUS at 09:37

## 2023-09-22 RX ADMIN — PROPOFOL INJECTABLE EMULSION 200 MG: 10 INJECTION, EMULSION INTRAVENOUS at 07:39

## 2023-09-22 RX ADMIN — LIDOCAINE HYDROCHLORIDE 50 MG: 20 INJECTION, SOLUTION INFILTRATION; PERINEURAL at 09:20

## 2023-09-22 RX ADMIN — ROCURONIUM BROMIDE 5 MG: 10 INJECTION INTRAVENOUS at 09:28

## 2023-09-22 RX ADMIN — CEFAZOLIN SODIUM 2000 MG: 2 SOLUTION INTRAVENOUS at 07:42

## 2023-09-22 RX ADMIN — ROCURONIUM BROMIDE 20 MG: 10 INJECTION INTRAVENOUS at 07:46

## 2023-09-22 RX ADMIN — PHENYLEPHRINE HYDROCHLORIDE 100 MCG: 10 INJECTION INTRAVENOUS at 09:01

## 2023-09-22 RX ADMIN — EPHEDRINE SULFATE 10 MG: 50 INJECTION INTRAVENOUS at 08:06

## 2023-09-22 RX ADMIN — LIDOCAINE HYDROCHLORIDE 50 MG: 20 INJECTION, SOLUTION INFILTRATION; PERINEURAL at 08:41

## 2023-09-22 RX ADMIN — DEXAMETHASONE SODIUM PHOSPHATE 4 MG: 4 INJECTION, SOLUTION INTRAMUSCULAR; INTRAVENOUS at 06:43

## 2023-09-22 RX ADMIN — KETAMINE HYDROCHLORIDE 20 MG: 10 INJECTION INTRAMUSCULAR; INTRAVENOUS at 07:45

## 2023-09-22 RX ADMIN — PHENYLEPHRINE HYDROCHLORIDE 50 MCG: 10 INJECTION INTRAVENOUS at 08:36

## 2023-09-22 RX ADMIN — ROCURONIUM BROMIDE 5 MG: 10 INJECTION INTRAVENOUS at 09:18

## 2023-09-22 RX ADMIN — ONDANSETRON 4 MG: 2 INJECTION INTRAMUSCULAR; INTRAVENOUS at 06:43

## 2023-09-22 RX ADMIN — PHENYLEPHRINE HYDROCHLORIDE 50 MCG: 10 INJECTION INTRAVENOUS at 09:15

## 2023-09-22 RX ADMIN — PHENYLEPHRINE HYDROCHLORIDE 100 MCG: 10 INJECTION INTRAVENOUS at 09:55

## 2023-09-22 RX ADMIN — ROCURONIUM BROMIDE 5 MG: 10 INJECTION INTRAVENOUS at 07:37

## 2023-09-22 RX ADMIN — ROCURONIUM BROMIDE 10 MG: 10 INJECTION INTRAVENOUS at 07:56

## 2023-09-22 NOTE — ANESTHESIA PREPROCEDURE EVALUATION
Anesthesia Evaluation     Patient summary reviewed and Nursing notes reviewed   no history of anesthetic complications:   NPO Solid Status: > 8 hours  NPO Liquid Status: > 4 hours           Airway   Mallampati: II  Dental      Comment: Permanent lower bridge    Pulmonary - negative pulmonary ROS Sleep apnea: snores.    ROS comment: Cough due to allergies  Cardiovascular - normal exam  Exercise tolerance: good (4-7 METS)    ECG reviewed  Rhythm: regular  Rate: normal    (+) hyperlipidemia    ROS comment: 12 lead EKG  Sinus rhythm  Abnormal lateral Q waves  When compared with ECG of 26-Apr-2021 8:02:38,  No significant change  Electronically Signed By: Sunday Chatman ([facility]) 15-Sep-2023 07:18:47  Date and Time of Study: 2023-09-14 09:17:07      Neuro/Psych  GI/Hepatic/Renal/Endo    (+) diabetes mellitus type 2 well controlled, thyroid problem hypothyroidism    Musculoskeletal   Neck pain: ACDF.  Abdominal    Substance History      OB/GYN negative ob/gyn ROS         Other   arthritis (hands, hips knees),   history of cancer                    Anesthesia Plan    ASA 2     general     intravenous induction     Anesthetic plan, risks, benefits, and alternatives have been provided, discussed and informed consent has been obtained with: patient and spouse/significant other.    Use of blood products discussed with patient and spouse/significant other  Consented to blood products.      CODE STATUS:

## 2023-09-22 NOTE — ANESTHESIA PROCEDURE NOTES
Airway  Urgency: elective    Date/Time: 9/22/2023 7:40 AM  Airway not difficult    General Information and Staff    Patient location during procedure: OR  Anesthesiologist: Janki Guajardo MD    Indications and Patient Condition  Indications for airway management: airway protection    Preoxygenated: yes  MILS maintained throughout  Mask difficulty assessment: 1 - vent by mask    Final Airway Details  Final airway type: endotracheal airway      Successful airway: ETT  Cuffed: yes   Successful intubation technique: direct laryngoscopy  Facilitating devices/methods: anterior pressure/BURP  Endotracheal tube insertion site: oral  Blade: Hughes  Blade size: 3  ETT size (mm): 7.5  Cormack-Lehane Classification: grade IIb - view of arytenoids or posterior of glottis only  Placement verified by: chest auscultation and capnometry   Cuff volume (mL): 6  Measured from: lips  ETT/EBT  to lips (cm): 22  Number of attempts at approach: 1  Assessment: lips, teeth, and gum same as pre-op and atraumatic intubation

## 2023-09-22 NOTE — H&P
First [facility] History and Physical    Patient Care Team:  Papo Anand DO as PCP - General (Family Medicine)    Chief complaint prostate ca    Subjective     Patient is a 73 y.o. male presents with prostate ca presents for HIFU.       Review of Systems   Pertinent items are noted in HPI    Past Medical History:   Diagnosis Date    Arthritis     Basal cell carcinoma     neck and shoulder    Cataract     Colon polyp     Diabetes     Type 2, boarderline. well controlled    Elevated cholesterol     Hay fever     Hearing impaired person, bilateral     wears hearing aids    Prostate cancer      Past Surgical History:   Procedure Laterality Date    CATARACT EXTRACTION Bilateral     CERVICAL FUSION  2021 6, 7, 8    COLONOSCOPY      COLONOSCOPY N/A 06/29/2016    Procedure: COLONOSCOPY; POLYPECTOMY;  Surgeon: Colin Rojas MD;  Location: [facility] OR;  Service:     COLONOSCOPY N/A 08/16/2019    Procedure: COLONOSCOPY, polypectomy;  Surgeon: Colin Rojas MD;  Location: [facility] OR;  Service: Gastroenterology     Family History   Problem Relation Age of Onset    Cancer Mother         pancreatic    Cancer Father         polysythemia/skin cancer    Malig Hyperthermia Neg Hx      Social History     Tobacco Use    Smoking status: Never    Smokeless tobacco: Never   Vaping Use    Vaping Use: Never used   Substance Use Topics    Alcohol use: Yes     Alcohol/week: 2.0 standard drinks     Types: 2 Glasses of wine per week     Comment: I PER DAY    Drug use: No       Meds:  Medications Prior to Admission   Medication Sig Dispense Refill Last Dose    atorvastatin (LIPITOR) 80 MG tablet Take 1 tablet by mouth Daily. 90 tablet 1 9/21/2023    CALCIUM ACETATE-MAGNESIUM CARB PO Take  by mouth.   9/8/2023    Cetirizine HCl 10 MG capsule Take  by mouth.   9/15/2023    Cholecalciferol (VITAMIN D3) 125 MCG (5000 UT) capsule capsule Take 2 capsules by mouth Daily.   9/8/2023    Dupilumab 300 MG/2ML solution  "pen-injector Inject 2 mL under the skin into the appropriate area as directed. 2 injections twice per month   9/8/2023    ezetimibe (ZETIA) 10 MG tablet TAKE ONE TABLET BY MOUTH DAILY 90 tablet 1 9/21/2023    fenofibrate 160 MG tablet Take 1 tablet by mouth Daily. 90 tablet 1 9/21/2023    glimepiride (AMARYL) 2 MG tablet TAKE ONE TABLET BY MOUTH EVERY MORNING BEFORE BREAKFAST 90 tablet 1 9/21/2023    metFORMIN (GLUCOPHAGE) 1000 MG tablet TAKE ONE TABLET BY MOUTH TWICE A DAY WITH MEALS 180 tablet 1 9/20/2023    Milk Thistle 1000 MG capsule Take  by mouth.   9/8/2023    Mis Natural Products (GLUCOSAMINE CHOND MSM FORMULA PO) Take  by mouth.   9/8/2023    Mis Natural Products (PRO NUTRIENTS FRUIT & VEGGIE PO) Take 3 capsules by mouth.   9/8/2023    QUERCETIN PO Take 1,000 mg by mouth.   9/8/2023    Sodium Fluoride 5000 Sensitive 1.1-5 % paste    9/22/2023    vitamin C (ASCORBIC ACID) 500 MG tablet Take 1 tablet by mouth Daily.   9/8/2023    glucose blood test strip Use as instructed to check blood sugar once daily 100 each 4     glucose monitor monitoring kit Use to check blood sugar once daily 1 each 0     Lancets Misc. kit Use stylus and lancet to check blood sugar once daily 100 each 4        Allergies:  Shrimp    Debilities:      Objective     Vital Signs  Temp:  [97.7 °F (36.5 °C)] 97.7 °F (36.5 °C)  Heart Rate:  [73] 73  Resp:  [16] 16  BP: (139)/(88) 139/88  No intake or output data in the 24 hours ending 09/22/23 0645  Flowsheet Rows      Flowsheet Row First Filed Value   Admission Height 182.9 cm (72\") Documented at 09/22/2023 0625   Admission Weight 103 kg (227 lb 14.4 oz) Documented at 09/22/2023 0625             Physical Exam:      General Appearance:    Alert, cooperative, in no acute distress   Head:    Normocephalic, without obvious abnormality, atraumatic   Eyes:            Lids and lashes normal, conjunctivae and sclerae normal, no   icterus, no pallor, corneas clear, PERRLA   Ears:    Ears appear " intact with no abnormalities noted   Throat:   No oral lesions, no thrush, oral mucosa moist   Neck:   No adenopathy, supple, trachea midline, no thyromegaly, no   carotid bruit, no JVD   Back:     No kyphosis present, no scoliosis present, no skin lesions,      erythema or scars, no tenderness to percussion or                   palpation,   range of motion normal   Lungs:     Clear to auscultation,respirations regular, even and                  unlabored    Heart:    Regular rhythm and normal rate, normal S1 and S2, no            murmur, no gallop, no rub, no click   Chest Wall:    No abnormalities observed   Abdomen:     Normal bowel sounds, no masses, no organomegaly, soft        non-tender, non-distended, no guarding, no rebound                tenderness   Rectal:     Deferred   Extremities:   Moves all extremities well, no edema, no cyanosis, no             redness   Pulses:   Pulses palpable and equal bilaterally   Skin:   No bleeding, bruising or rash   Lymph nodes:   No palpable adenopathy   Neurologic:   Cranial nerves 2 - 12 grossly intact, sensation intact, DTR       present and equal bilaterally     Results Review:    I reviewed the patient's new clinical results.  Results for orders placed or performed during the hospital encounter of 09/22/23   POC Glucose Once    Specimen: Blood   Result Value Ref Range    Glucose 88 70 - 130 mg/dL        Assessment:  Prostate ca    Plan:    HIFU with sp tube    I discussed the patient's findings and my recommendations with patient.     Ced Sheehan Jr., MD  09/22/23  06:45 EDT

## 2023-09-22 NOTE — DISCHARGE INSTRUCTIONS
Suprapubic Catheter Instruction Post HIFU    Overview:  After your HIFU procedure, your prostate will swell and HIFU treated prostate tissue will temporarily obstruct the urinary channel.  You will not be able to urinate normally for the first several days after treatment.  You will rely on your catheter placed by the physician at the time of your HIFU treatment.      You will be provided with two urinary drainage bags:  Small leg bag  Large bag for overnight use    General Instructions:  Drink plenty of fluids.  Drink at least 8 - 8oz glasses of fluid daily, preferably water.  Sodas and alcohol do not count as fluid.  It is very important when working with the catheter connections and insertion site that you wash your hands with soap and warm water before and after catheter care.  Before inserting anything into the catheter clean the connectors with alcohol wipes.  Change dressing (bandage) as necessary and/or daily.    Showering:  You can take your first shower 48hours after your procedure.  Before showering, remove the dressing, close the clamp and remove catheter bag.  Wash incision site with soap and warm water, rinse well, pat dry and replace dressing.   Ambulate and resume all normal, non-strenuous activities.   Passage of tissue and small amounts of blood mixed with urine is completely normal.  Avoid constipation by increasing your fiber intake and/or you a stool softener.  Always keep your urine bag below the level of the bladder.  Change to the large bag at bedtime and ensure urine is draining before you go to sleep.   DO NOT insert anything into the rectum for 6 months post HIFU to allow for appropriate healing.  DO NOT immerse catheter insertion sites in bath, swimming pools or hot tubs until well healed.      You will start your voiding trial 7 days after your treatment.  Your physician will let you know when to start.  The voiding trials contains 3 phases, measuring the amounts of residual urine left  in the bladder after certain amounts of time.  Each phase works towards removal of the catheter.                Start Phase 1 when instructed by HIFU nurse or your physician.    Phase 1.  Start voiding trial as described in the following steps during the day.  You will continue to connect to your large bag at night.  In the morning empty your overnight bag and remove from the catheter.  Close the catheter clamp.  Place the plug in the end of the catheter.  Your bladder will now begin to fill with urine.  When you feel the urge to urinate attempt to urinate normally into the toilet.  If no urges, attempt to urinate hourly.  DO NOT PUSH or STRAIN.  If you feel pain, STOP.  6 hours after the initial time you clamped you should attempt to urinate once more then connect the catheter to one of the bags and release the clamp allowing the remaining urine to drain into the bag.  The amount of urine left in the bladder is called residual urine.    Measure and records these amounts doing steps 2 through 4 at least TWICE a day.    Once you have completed these steps with residual amounts of 150ml or less you should  proceed to phase 2.  Some patients need to repeat this step for several days before they are successful.      Phase 2. You will keep the catheter clamped for progressively longer periods of time and continue to measure the residual urine.  You will connect to your overnight bag at night with the catheter unclamped.    Repeat steps 1-3 as listed above.      Now only checking every 10-12 hours.  Measure and records these amounts doing steps 2 through 4 continually during waking hours.    Once you have completed these steps with residual amounts of 150ml or less you should contact your surgery scheduler or triage nurse prior to clamping through the night.       Phase 3.  You will now keep the catheter clamp closed for 24 hours.  Clamp off first thing in the morning and continue to urinate often as if the catheter isn't  there.  When you wake up the next morning, urinate a few times over 30 minutes, then open the clamp and record the residual urine.  Notify the office if when your residual is 150 or less to schedule your catheter removal.

## 2023-09-22 NOTE — BRIEF OP NOTE
HIGH INTENSITY FOCUSED ULTRASOUND PROSTATE  Progress Note    Krishna Le  9/22/2023    Pre-op Diagnosis:   C61       Post-Op Diagnosis Codes:   Prostate cancer    Procedure/CPT® Codes:        Procedure(s):  HIGH INTENSITY FOCUSED ULTRASOUND WHOLE GLAND WITH SUPRAPUBIC CATHETER              Surgeon(s):  Ced Sheehan Jr., MD    Anesthesia: General    Staff:   Circulator: Kayli Hughes RN; Laquita Acevedo RN  Scrub Person: Bridget Lew PCT; Key Chong  Vendor Representative: Tlaya Garcia         Estimated Blood Loss: none    Urine Voided: * No values recorded between 9/22/2023  7:32 AM and 9/22/2023 10:08 AM *    Specimens:                None          Drains:   Suprapubic Catheter Non-latex 16 Fr. (Active)   Dressing Type Dry dressing 09/22/23 0957   Collection Container Standard drainage bag 09/22/23 0957       [REMOVED] Urethral Catheter Silicone 16 Fr. (Removed)       Findings: localized ca prostate        Complications: none          Ced Sheehan Jr., MD     Date: 9/22/2023  Time: 10:10 EDT

## 2023-10-26 ENCOUNTER — TELEPHONE (OUTPATIENT)
Dept: FAMILY MEDICINE CLINIC | Facility: CLINIC | Age: 73
End: 2023-10-26

## 2023-10-26 DIAGNOSIS — E78.2 MIXED HYPERLIPIDEMIA: ICD-10-CM

## 2023-10-26 DIAGNOSIS — E11.9 TYPE 2 DIABETES MELLITUS WITHOUT COMPLICATION, WITHOUT LONG-TERM CURRENT USE OF INSULIN: ICD-10-CM

## 2023-10-26 RX ORDER — EZETIMIBE 10 MG/1
TABLET ORAL
Qty: 90 TABLET | Refills: 1 | Status: SHIPPED | OUTPATIENT
Start: 2023-10-26

## 2023-10-26 RX ORDER — GLIMEPIRIDE 2 MG/1
TABLET ORAL
Qty: 90 TABLET | Refills: 1 | Status: SHIPPED | OUTPATIENT
Start: 2023-10-26

## 2023-10-26 NOTE — TELEPHONE ENCOUNTER
Caller: Krishna Le    Relationship: Self    Best call back number: [phone]        Who are you requesting to speak with (clinical staff, provider,  specific staff member):         What was the call regarding:   PATIENT HAS LABS COMING UP ON 10-31-23. PATIENT WANTED TO SEE IF YOU WOULD ADD BRCA TEST FOR CANCER PLEASE???      PLEASE CALL

## 2023-10-26 NOTE — TELEPHONE ENCOUNTER
PATIENT HAS LABS COMING UP ON 10-31-23. PATIENT WANTED TO SEE IF YOU WOULD ADD BRCA TEST FOR CANCER PLEASE???

## 2023-10-27 DIAGNOSIS — C61 PROSTATE CANCER: Primary | ICD-10-CM

## 2023-10-30 DIAGNOSIS — E55.9 VITAMIN D DEFICIENCY: ICD-10-CM

## 2023-10-30 DIAGNOSIS — C61 PROSTATE CANCER: ICD-10-CM

## 2023-10-30 DIAGNOSIS — E11.9 TYPE 2 DIABETES MELLITUS WITHOUT COMPLICATION, WITHOUT LONG-TERM CURRENT USE OF INSULIN: ICD-10-CM

## 2023-10-30 DIAGNOSIS — E78.2 MIXED HYPERLIPIDEMIA: ICD-10-CM

## 2023-10-30 DIAGNOSIS — E66.09 EXOGENOUS OBESITY: ICD-10-CM

## 2023-10-30 DIAGNOSIS — R97.20 ELEVATED PSA: ICD-10-CM

## 2023-10-30 NOTE — TELEPHONE ENCOUNTER
Attempted to call patient, no answer. Left message that Dr. Anand had ordered the test that he requested and he just needed to call and schedule a lab appointment

## 2023-10-31 NOTE — OP NOTE
PREOPERATIVE DIAGNOSES: Intermediate risk prostate carcinoma.    POSTOPERATIVE DIAGNOSES: Intermediate risk prostate carcinoma.     PROCEDURE PERFORMED: High intensity focused ultrasound ablation of the prostate with suprapubic tube placement.    SURGEON: Ced Sheehan MD    ANESTHESIA: General.     INDICATIONS: This gentleman presented with intermediate risk prostate carcinoma.  All treatment options were discussed and he has elected to proceed with whole gland HIFU treatment with SP tube placement, including urethral sparing and neurovascular sparing.  All risks, benefits, and options have been discussed in detail with the patient in detail.      DESCRIPTION OF PROCEDURE: The patient was brought to the operating room.  General anesthetic was administered.  The lower abdomen and genitalia were prepped and draped in the usual sterile fashion.  A Valenzuela catheter was introduced into the bladder and the bladder was instilled with 300 mL of sterile saline.  The catheter was then plugged.  A small suprapubic incision was made, dissection carried down sharply via anterior rectus fascia.  The suprapubic trocar was then introduced into the bladder without difficulty.  The inner trocar was removed leaving the outer sheath in place and a 16-Marshallese catheter was introduced through the outer sheath and was well positioned in the bladder.  the balloon was inflated, the outer sheath was removed, and the catheter was secured to the skin with a 3-0 nylon suture.  He was then placed in the low lithotomy position.        The rectal vault was irrigated free of any fecal debris, and once this was accomplished the HIFU probe was introduced using the articulating arm and stepper device.  Prostate volume was calculated and 3 zones of treatment were selected.  The anterior zone was treated with 40 ibarra using the 4 cm focal length transducer, the middle zone was treated in similar fashion with urethral sparing using 34-36 ibarra, and  finally the posterior zone was treated using an energy setting of 22-24 ibarra with a 3 cm focal length transducer.  Rectal temperature monitoring was carried out throughout as well as careful attention to the neurovascular bundles and sphincter mechanism.  Once the treatment had been completed, the probe was removed, the SP tube was allowed to drain.  He tolerated the procedure well and was transferred to the recovery room in stable condition.  There were no complications.

## 2023-11-01 ENCOUNTER — TELEPHONE (OUTPATIENT)
Dept: FAMILY MEDICINE CLINIC | Facility: CLINIC | Age: 73
End: 2023-11-01

## 2023-11-01 NOTE — TELEPHONE ENCOUNTER
Hub staff attempted to follow warm transfer process and was unsuccessful     Caller: LEO - [facility]    Relationship to patient: Lab    Best call back number: [phone]     Patient is needing: LEO, WITH [facility], CALLED REQUESTING CLARIFICATION FOR THE PATIENT'S BRCA TESTING.    IF NO RESPONSE IS RECEIVED BY FRIDAY NOV. 3RD SHE WILL HAVE TO CANCEL THE TESTING.

## 2023-11-06 ENCOUNTER — OFFICE VISIT (OUTPATIENT)
Dept: FAMILY MEDICINE CLINIC | Facility: CLINIC | Age: 73
End: 2023-11-06
Payer: MEDICARE

## 2023-11-06 VITALS
SYSTOLIC BLOOD PRESSURE: 120 MMHG | BODY MASS INDEX: 30.48 KG/M2 | TEMPERATURE: 97.3 F | DIASTOLIC BLOOD PRESSURE: 80 MMHG | OXYGEN SATURATION: 98 % | HEART RATE: 81 BPM | HEIGHT: 72 IN | WEIGHT: 225 LBS

## 2023-11-06 DIAGNOSIS — Z00.00 MEDICARE ANNUAL WELLNESS VISIT, SUBSEQUENT: Primary | ICD-10-CM

## 2023-11-06 DIAGNOSIS — E78.2 MIXED HYPERLIPIDEMIA: ICD-10-CM

## 2023-11-06 DIAGNOSIS — C61 PROSTATE CANCER: ICD-10-CM

## 2023-11-06 DIAGNOSIS — E66.09 EXOGENOUS OBESITY: ICD-10-CM

## 2023-11-06 DIAGNOSIS — E55.9 VITAMIN D DEFICIENCY: ICD-10-CM

## 2023-11-06 DIAGNOSIS — E11.9 TYPE 2 DIABETES MELLITUS WITHOUT COMPLICATION, WITHOUT LONG-TERM CURRENT USE OF INSULIN: ICD-10-CM

## 2023-11-06 LAB
25(OH)D3+25(OH)D2 SERPL-MCNC: 70.1 NG/ML (ref 30–100)
ALBUMIN SERPL-MCNC: 4.6 G/DL (ref 3.5–5.2)
ALBUMIN/GLOB SERPL: 1.3 G/DL
ALP SERPL-CCNC: 33 U/L (ref 39–117)
ALT SERPL-CCNC: 30 U/L (ref 1–41)
AST SERPL-CCNC: 28 U/L (ref 1–40)
BASOPHILS # BLD AUTO: 0.05 10*3/MM3 (ref 0–0.2)
BASOPHILS NFR BLD AUTO: 0.8 % (ref 0–1.5)
BILIRUB SERPL-MCNC: 0.5 MG/DL (ref 0–1.2)
BUN SERPL-MCNC: 15 MG/DL (ref 8–23)
BUN/CREAT SERPL: 12.3 (ref 7–25)
CALCIUM SERPL-MCNC: 10.1 MG/DL (ref 8.6–10.5)
CHLORIDE SERPL-SCNC: 106 MMOL/L (ref 98–107)
CHOLEST SERPL-MCNC: 125 MG/DL (ref 0–200)
CO2 SERPL-SCNC: 25.9 MMOL/L (ref 22–29)
CREAT SERPL-MCNC: 1.22 MG/DL (ref 0.76–1.27)
EGFRCR SERPLBLD CKD-EPI 2021: 62.2 ML/MIN/1.73
EOSINOPHIL # BLD AUTO: 0.18 10*3/MM3 (ref 0–0.4)
EOSINOPHIL NFR BLD AUTO: 3 % (ref 0.3–6.2)
ERYTHROCYTE [DISTWIDTH] IN BLOOD BY AUTOMATED COUNT: 12.9 % (ref 12.3–15.4)
GLOBULIN SER CALC-MCNC: 3.5 GM/DL
GLUCOSE SERPL-MCNC: 90 MG/DL (ref 65–99)
HBA1C MFR BLD: 5.6 % (ref 4.8–5.6)
HCT VFR BLD AUTO: 41.8 % (ref 37.5–51)
HDLC SERPL-MCNC: 53 MG/DL (ref 40–60)
HGB BLD-MCNC: 13.7 G/DL (ref 13–17.7)
IMM GRANULOCYTES # BLD AUTO: 0.01 10*3/MM3 (ref 0–0.05)
IMM GRANULOCYTES NFR BLD AUTO: 0.2 % (ref 0–0.5)
LDLC SERPL CALC-MCNC: 60 MG/DL (ref 0–100)
LYMPHOCYTES # BLD AUTO: 1.27 10*3/MM3 (ref 0.7–3.1)
LYMPHOCYTES NFR BLD AUTO: 20.9 % (ref 19.6–45.3)
MCH RBC QN AUTO: 29.8 PG (ref 26.6–33)
MCHC RBC AUTO-ENTMCNC: 32.8 G/DL (ref 31.5–35.7)
MCV RBC AUTO: 90.9 FL (ref 79–97)
MONOCYTES # BLD AUTO: 0.58 10*3/MM3 (ref 0.1–0.9)
MONOCYTES NFR BLD AUTO: 9.5 % (ref 5–12)
NEUTROPHILS # BLD AUTO: 4 10*3/MM3 (ref 1.7–7)
NEUTROPHILS NFR BLD AUTO: 65.6 % (ref 42.7–76)
NRBC BLD AUTO-RTO: 0 /100 WBC (ref 0–0.2)
PLATELET # BLD AUTO: 315 10*3/MM3 (ref 140–450)
POTASSIUM SERPL-SCNC: 4.8 MMOL/L (ref 3.5–5.2)
PROT SERPL-MCNC: 8.1 G/DL (ref 6–8.5)
PSA SERPL-MCNC: 1.55 NG/ML (ref 0–4)
RBC # BLD AUTO: 4.6 10*6/MM3 (ref 4.14–5.8)
REQUEST PROBLEM: NORMAL
SODIUM SERPL-SCNC: 142 MMOL/L (ref 136–145)
TRIGL SERPL-MCNC: 53 MG/DL (ref 0–150)
TSH SERPL DL<=0.005 MIU/L-ACNC: 4.11 UIU/ML (ref 0.27–4.2)
VLDLC SERPL CALC-MCNC: 12 MG/DL (ref 5–40)
WBC # BLD AUTO: 6.09 10*3/MM3 (ref 3.4–10.8)

## 2023-11-06 PROCEDURE — 1170F FXNL STATUS ASSESSED: CPT | Performed by: FAMILY MEDICINE

## 2023-11-06 PROCEDURE — 99214 OFFICE O/P EST MOD 30 MIN: CPT | Performed by: FAMILY MEDICINE

## 2023-11-06 PROCEDURE — 1160F RVW MEDS BY RX/DR IN RCRD: CPT | Performed by: FAMILY MEDICINE

## 2023-11-06 PROCEDURE — G0439 PPPS, SUBSEQ VISIT: HCPCS | Performed by: FAMILY MEDICINE

## 2023-11-06 PROCEDURE — 1159F MED LIST DOCD IN RCRD: CPT | Performed by: FAMILY MEDICINE

## 2023-11-06 PROCEDURE — 3044F HG A1C LEVEL LT 7.0%: CPT | Performed by: FAMILY MEDICINE

## 2023-11-06 RX ORDER — ATORVASTATIN CALCIUM 80 MG/1
80 TABLET, FILM COATED ORAL DAILY
Qty: 90 TABLET | Refills: 1 | Status: SHIPPED | OUTPATIENT
Start: 2023-11-06

## 2023-11-06 RX ORDER — TAMSULOSIN HYDROCHLORIDE 0.4 MG/1
1 CAPSULE ORAL DAILY
COMMUNITY

## 2023-11-06 RX ORDER — FENOFIBRATE 160 MG/1
160 TABLET ORAL DAILY
Qty: 90 TABLET | Refills: 1 | Status: SHIPPED | OUTPATIENT
Start: 2023-11-06

## 2023-11-07 NOTE — PROGRESS NOTES
The ABCs of the Annual Wellness Visit  Subsequent Medicare Wellness Visit    Subjective    Krishna Le is a 73 y.o. male who presents for a Subsequent Medicare Wellness Visit.    The following portions of the patient's history were reviewed and   updated as appropriate: allergies, current medications, past family history, past medical history, past social history, past surgical history, and problem list.    Compared to one year ago, the patient feels his physical   health is the same.    Compared to one year ago, the patient feels his mental   health is better.    Recent Hospitalizations:  He was not admitted to the hospital during the last year.       Current Medical Providers:  Patient Care Team:  Papo Anand DO as PCP - General (Family Medicine)    Outpatient Medications Prior to Visit   Medication Sig Dispense Refill    CALCIUM ACETATE-MAGNESIUM CARB PO Take  by mouth.      Cetirizine HCl 10 MG capsule Take  by mouth.      Cholecalciferol (VITAMIN D3) 125 MCG (5000 UT) capsule capsule Take 2 capsules by mouth Daily.      Dupilumab 300 MG/2ML solution pen-injector Inject 2 mL under the skin into the appropriate area as directed. 2 injections twice per month      ezetimibe (ZETIA) 10 MG tablet TAKE ONE TABLET BY MOUTH DAILY 90 tablet 1    glucose blood test strip Use as instructed to check blood sugar once daily 100 each 4    glucose monitor monitoring kit Use to check blood sugar once daily 1 each 0    Lancets Misc. kit Use stylus and lancet to check blood sugar once daily 100 each 4    metFORMIN (GLUCOPHAGE) 1000 MG tablet TAKE ONE TABLET BY MOUTH TWICE A DAY WITH MEALS 180 tablet 1    Milk Thistle 1000 MG capsule Take  by mouth.      Misc Natural Products (GLUCOSAMINE CHOND MSM FORMULA PO) Take  by mouth.      Misc Natural Products (PRO NUTRIENTS FRUIT & VEGGIE PO) Take 3 capsules by mouth.      QUERCETIN PO Take 1,000 mg by mouth.      Sodium Fluoride 5000 Sensitive 1.1-5 % paste       tamsulosin  "(FLOMAX) 0.4 MG capsule 24 hr capsule Take 1 capsule by mouth Daily.      vitamin C (ASCORBIC ACID) 500 MG tablet Take 1 tablet by mouth Daily.      atorvastatin (LIPITOR) 80 MG tablet Take 1 tablet by mouth Daily. 90 tablet 1    fenofibrate 160 MG tablet Take 1 tablet by mouth Daily. 90 tablet 1    glimepiride (AMARYL) 2 MG tablet TAKE ONE TABLET BY MOUTH EVERY MORNING BEFORE BREAKFAST 90 tablet 1     No facility-administered medications prior to visit.       No opioid medication identified on active medication list. I have reviewed chart for other potential  high risk medication/s and harmful drug interactions in the elderly.        Aspirin is not on active medication list.  Aspirin use is not indicated based on review of current medical condition/s. Risk of harm outweighs potential benefits.  .    Patient Active Problem List   Diagnosis    Mixed hyperlipidemia    Erectile dysfunction    Vitamin D deficiency    Exogenous obesity    Adenomatous polyp of colon    Type 2 diabetes mellitus without complication, without long-term current use of insulin    Acquired hypothyroidism    Subclinical hypothyroidism    Prostate cancer     Advance Care Planning   Advance Care Planning     Advance Directive is on file.  ACP discussion was held with the patient during this visit. Patient has an advance directive in EMR which is still valid.      Objective    Vitals:    11/06/23 0925   BP: 120/80   BP Location: Left arm   Patient Position: Sitting   Cuff Size: Large Adult   Pulse: 81   Temp: 97.3 °F (36.3 °C)   SpO2: 98%   Weight: 102 kg (225 lb)   Height: 182.9 cm (72\")     Estimated body mass index is 30.52 kg/m² as calculated from the following:    Height as of this encounter: 182.9 cm (72\").    Weight as of this encounter: 102 kg (225 lb).           Does the patient have evidence of cognitive impairment? No    Lab Results   Component Value Date    CHLPL 125 10/31/2023    TRIG 53 10/31/2023    HDL 53 10/31/2023    LDL 60 " 10/31/2023    VLDL 12 10/31/2023    HGBA1C 5.60 10/31/2023        HEALTH RISK ASSESSMENT    Smoking Status:  Social History     Tobacco Use   Smoking Status Never   Smokeless Tobacco Never     Alcohol Consumption:  Social History     Substance and Sexual Activity   Alcohol Use Yes    Alcohol/week: 2.0 standard drinks of alcohol    Types: 2 Glasses of wine per week    Comment: I PER DAY     Fall Risk Screen:    CHARLA Fall Risk Assessment was completed, and patient is at LOW risk for falls.Assessment completed on:2023    Depression Screenin/6/2023     9:35 AM   PHQ-2/PHQ-9 Depression Screening   Little Interest or Pleasure in Doing Things 0-->not at all   Feeling Down, Depressed or Hopeless 0-->not at all   PHQ-9: Brief Depression Severity Measure Score 0       Health Habits and Functional and Cognitive Screenin/6/2023     9:32 AM   Functional & Cognitive Status   Do you have difficulty preparing food and eating? No   Do you have difficulty bathing yourself, getting dressed or grooming yourself? No   Do you have difficulty using the toilet? No   Do you have difficulty moving around from place to place? No   Do you have trouble with steps or getting out of a bed or a chair? No   Current Diet Well Balanced Diet   Dental Exam Up to date   Eye Exam Up to date   Exercise (times per week) 7 times per week   Current Exercises Include Yard Work;Walking   Do you need help using the phone?  No   Are you deaf or do you have serious difficulty hearing?  No   Do you need help to go to places out of walking distance? No   Do you need help shopping? No   Do you need help preparing meals?  No   Do you need help with housework?  No   Do you need help with laundry? No   Do you need help taking your medications? No   Do you need help managing money? No   Do you ever drive or ride in a car without wearing a seat belt? No   Have you felt unusual stress, anger or loneliness in the last month? Yes   Who do you live  with? Spouse   If you need help, do you have trouble finding someone available to you? No   Have you been bothered in the last four weeks by sexual problems? No   Do you have difficulty concentrating, remembering or making decisions? No       Age-appropriate Screening Schedule:  Refer to the list below for future screening recommendations based on patient's age, sex and/or medical conditions. Orders for these recommended tests are listed in the plan section. The patient has been provided with a written plan.    Health Maintenance   Topic Date Due    DIABETIC FOOT EXAM  10/23/2019    COVID-19 Vaccine (4 - 2023-24 season) 01/31/2024 (Originally 9/1/2023)    INFLUENZA VACCINE  01/31/2024 (Originally 8/1/2023)    DIABETIC EYE EXAM  03/29/2024 (Originally 8/2/2023)    HEMOGLOBIN A1C  04/30/2024    URINE MICROALBUMIN  05/09/2024    BMI FOLLOWUP  05/17/2024    COLORECTAL CANCER SCREENING  08/16/2024    LIPID PANEL  10/31/2024    ANNUAL WELLNESS VISIT  11/06/2024    TDAP/TD VACCINES (3 - Td or Tdap) 11/08/2029    HEPATITIS C SCREENING  Completed    Pneumococcal Vaccine 65+  Completed    ZOSTER VACCINE  Discontinued                  CMS Preventative Services Quick Reference  Risk Factors Identified During Encounter  Immunizations Discussed/Encouraged: Influenza  Polypharmacy: Medication List reviewed  Urinary Incontinence: Kegel exercises discussed  Dental Screening Recommended  Vision Screening Recommended  The above risks/problems have been discussed with the patient.  Pertinent information has been shared with the patient in the After Visit Summary.  An After Visit Summary and PPPS were made available to the patient.    Follow Up:   Next Medicare Wellness visit to be scheduled in 1 year.       Additional E&M Note during same encounter follows:  Patient has multiple medical problems which are significant and separately identifiable that require additional work above and beyond the Medicare Wellness Visit.      Chief  "Complaint  Medicare Wellness-subsequent    Subjective        HPI  Krishna Le is also being seen today for 73-year-old white male with subsequent Medicare wellness visit as well as follow-up in regards to type II non-insulin-dependent diabetes mellitus, hyperlipidemia as well as vitamin D deficiency, acquired hypothyroidism and prostate cancer.  Patient is recently status post HIFU for prostate cancer.  He is doing very well with minimal incontinence which seems to be getting better with each day.  He initially had a suprapubic catheter that was discontinued once he began to urinate decently.  Current medications include atorvastatin, Zetia, fenofibrate, glimepiride as well as metformin.  He also uses tamsulosin as well as a host of supplements and vitamins.  All medications and supplements are used appropriately and are well-tolerated without side effects.  Fasting labs have been acquired prior to this visit and are printed as BRCA testing is still not available.  In general he is doing well and with high spirits.    Review of Systems   Cardiovascular:         Hyperlipidemia   Endocrine:        Exogenous obesity, vitamin D deficiency, type II non-insulin-dependent diabetes mellitus   Genitourinary:         Prostate cancer       Objective   Vital Signs:  /80 (BP Location: Left arm, Patient Position: Sitting, Cuff Size: Large Adult)   Pulse 81   Temp 97.3 °F (36.3 °C)   Ht 182.9 cm (72\")   Wt 102 kg (225 lb)   SpO2 98%   BMI 30.52 kg/m²     Physical Exam  Vitals and nursing note reviewed.   Constitutional:       Appearance: Normal appearance. He is well-developed and well-groomed.   HENT:      Head: Normocephalic and atraumatic.   Neck:      Thyroid: No thyroid mass or thyromegaly.      Vascular: Normal carotid pulses. No carotid bruit.      Trachea: Trachea and phonation normal.   Cardiovascular:      Rate and Rhythm: Normal rate and regular rhythm.      Heart sounds: Normal heart sounds. No murmur " heard.     No friction rub. No gallop.   Pulmonary:      Effort: Pulmonary effort is normal. No respiratory distress.      Breath sounds: Normal breath sounds. No decreased breath sounds, wheezing, rhonchi or rales.   Musculoskeletal:      Cervical back: Neck supple.   Lymphadenopathy:      Cervical: No cervical adenopathy.   Skin:     General: Skin is warm and dry.      Findings: No rash.   Neurological:      Mental Status: He is alert and oriented to person, place, and time.   Psychiatric:         Attention and Perception: Attention and perception normal.         Mood and Affect: Mood and affect normal.         Speech: Speech normal.         Behavior: Behavior normal. Behavior is cooperative.         Thought Content: Thought content normal.         Cognition and Memory: Cognition and memory normal.         Judgment: Judgment normal.          The following data was reviewed by: Papo Anand DO on 11/06/2023:  Common labs          5/9/2023    08:11 9/14/2023    09:34 10/31/2023    09:26   Common Labs   Glucose 97  119  90    BUN 17  21  15    Creatinine 1.03  1.02  1.22    Sodium 141  134  142    Potassium 4.7  4.1  4.8    Chloride 105  101  106    Calcium 10.4  9.9  10.1    Total Protein 8.0   8.1    Albumin 4.4  4.2  4.6    Total Bilirubin 0.4  0.5  0.5    Alkaline Phosphatase 30  35  33    AST (SGOT) 24  29  28    ALT (SGPT) 31  39  30    WBC 5.39  5.97  6.09    Hemoglobin 14.3  14.7  13.7    Hematocrit 43.3  44.4  41.8    Platelets 282  265  315    Total Cholesterol 125   125    Triglycerides 55   53    HDL Cholesterol 48   53    LDL Cholesterol  65   60    Hemoglobin A1C 5.60   5.60    Microalbumin, Urine 10.8      PSA   1.550      Data reviewed : GI studies colonoscopy           Assessment and Plan   Diagnoses and all orders for this visit:    1. Medicare annual wellness visit, subsequent (Primary)  -     Lipid panel; Future  -     Comprehensive metabolic panel; Future  -     TSH; Future  -     Hemoglobin  A1c; Future  -     Vitamin D 25 hydroxy; Future  -     CBC w AUTO Differential; Future    2. Mixed hyperlipidemia  -     fenofibrate 160 MG tablet; Take 1 tablet by mouth Daily.  Dispense: 90 tablet; Refill: 1  -     atorvastatin (LIPITOR) 80 MG tablet; Take 1 tablet by mouth Daily.  Dispense: 90 tablet; Refill: 1  -     Lipid panel; Future  -     Comprehensive metabolic panel; Future  -     TSH; Future  -     Hemoglobin A1c; Future  -     Vitamin D 25 hydroxy; Future  -     CBC w AUTO Differential; Future    3. Exogenous obesity  -     Lipid panel; Future  -     Comprehensive metabolic panel; Future  -     TSH; Future  -     Hemoglobin A1c; Future  -     Vitamin D 25 hydroxy; Future  -     CBC w AUTO Differential; Future    4. Type 2 diabetes mellitus without complication, without long-term current use of insulin  -     Lipid panel; Future  -     Comprehensive metabolic panel; Future  -     TSH; Future  -     Hemoglobin A1c; Future  -     Vitamin D 25 hydroxy; Future  -     CBC w AUTO Differential; Future    5. Vitamin D deficiency  -     Lipid panel; Future  -     Comprehensive metabolic panel; Future  -     TSH; Future  -     Hemoglobin A1c; Future  -     Vitamin D 25 hydroxy; Future  -     CBC w AUTO Differential; Future    6. Prostate cancer  -     Lipid panel; Future  -     Comprehensive metabolic panel; Future  -     TSH; Future  -     Hemoglobin A1c; Future  -     Vitamin D 25 hydroxy; Future  -     CBC w AUTO Differential; Future    Given patient's current hemoglobin A1c glimepiride will be discontinued with metformin to remain in patient's regimen.  All other medications will continue without alteration.  Anticipate repeat fasting labs in 6 months with follow-up with me thereafter.       I spent 30 minutes caring for Krishna on this date of service. This time includes time spent by me in the following activities:preparing for the visit, reviewing tests, obtaining and/or reviewing a separately obtained  history, performing a medically appropriate examination and/or evaluation , counseling and educating the patient/family/caregiver, ordering medications, tests, or procedures, referring and communicating with other health care professionals , documenting information in the medical record, independently interpreting results and communicating that information with the patient/family/caregiver, and care coordination  Follow Up   Return in about 6 months (around 5/6/2024) for Recheck.  Patient was given instructions and counseling regarding his condition or for health maintenance advice. Please see specific information pulled into the AVS if appropriate.

## 2023-12-21 ENCOUNTER — TELEPHONE (OUTPATIENT)
Dept: FAMILY MEDICINE CLINIC | Facility: CLINIC | Age: 73
End: 2023-12-21

## 2023-12-21 NOTE — TELEPHONE ENCOUNTER
Caller: Krishna Le    Relationship: Self    Best call back number: [phone]      What test was performed: BRCA - THIS WAS AN EXTRA TEST ORDERED AND DR. SOUTH APPROVED THIS    When was the test performed: 11/06/2023    Where was the test performed: OFFICE     Additional notes: PATIENT WOULD LIKE TO KNOW WHAT THESE RESULTS WERE, HE NEVER RECEIVED THEM. PLEASE CALL OR TEXT PATIENT WITH RESULTS.

## 2023-12-21 NOTE — TELEPHONE ENCOUNTER
Attempted to call patient, no answer. No voicemail.    The BRCA test was not completed due to [facility] needing more information. The correct information was given and [facility] will fax the authorization to complete the test to our office to sign and return for the test to be completed.

## 2024-01-05 LAB
BRCA1+BRCA2 MUT ANL BLD/T: ABNORMAL
CITATION REF LAB TEST: ABNORMAL
CLINICAL INFO: ABNORMAL
IMP & REVIEW OF LAB RESULTS: ABNORMAL
LAB DIRECTOR NAME PROVIDER: ABNORMAL
PREAUTHORIZATION: ABNORMAL
RECOMMENDATION PATIENT DOC-IMP: ABNORMAL
REF LAB TEST METHOD: ABNORMAL
SPECIMEN SOURCE: ABNORMAL

## 2024-01-09 DIAGNOSIS — Z15.03 BRCA GENE MUTATION POSITIVE IN MALE: Primary | ICD-10-CM

## 2024-01-09 DIAGNOSIS — Z15.01 BRCA GENE MUTATION POSITIVE IN MALE: Primary | ICD-10-CM

## 2024-01-09 DIAGNOSIS — Z15.09 BRCA GENE MUTATION POSITIVE IN MALE: Primary | ICD-10-CM

## 2024-01-23 ENCOUNTER — CLINICAL SUPPORT (OUTPATIENT)
Dept: GENETICS | Facility: HOSPITAL | Age: 74
End: 2024-01-23

## 2024-01-23 DIAGNOSIS — Z15.03 BRCA2 GENE MUTATION POSITIVE IN MALE: ICD-10-CM

## 2024-01-23 DIAGNOSIS — Z80.0 FAMILY HISTORY OF PANCREATIC CANCER: ICD-10-CM

## 2024-01-23 DIAGNOSIS — C61 PROSTATE CANCER: ICD-10-CM

## 2024-01-23 DIAGNOSIS — Z15.09 BRCA2 GENE MUTATION POSITIVE IN MALE: ICD-10-CM

## 2024-01-23 DIAGNOSIS — Z15.01 BRCA2 GENE MUTATION POSITIVE IN MALE: ICD-10-CM

## 2024-01-23 DIAGNOSIS — Z13.79 GENETIC TESTING: Primary | ICD-10-CM

## 2024-01-23 PROCEDURE — 96040: CPT

## 2024-01-23 NOTE — PROGRESS NOTES
Krishna Le, a 74-year-old male, was referred for genetic counseling due to a personal history of a BRCA2 mutation. He was accompanied to the appointment by his wife, Leslie. He was diagnosed last year with a high risk prostate cancer. He underwent robotic prostatectomy. He had HIFU treatment. He also has a history of basal cell skin cancer on his neck and shoulder. He recently had a squamous cell skin cancer removed from his face. He is following with dermatology every 6 months. He has colonoscopies every 4-5 years and reports a history of around 3 benign polyps. Following this diagnosis, Dr. Anand ordered genetic testing through [facility] which evaluated the only the BRCA1/2 genes. Mr. Le was found to have a pathogenic BRCA2 mutation ([id number]). He was interested in discussing the risks related to BRCA2 and the management guidelines.    PERTINENT FAMILY HISTORY: (See attached pedigree)   Mother:   Pancreatic cancer, 55  Mat. Grandmother:  Possible pancreatic cancer  Father:   Skin cancer, possible melanoma, 40s     Polycythemia, 40s     We do not have medical records regarding any of these diagnoses.     GENETIC COUNSELING (30 minutes):  We reviewed the family history information in detail. Cases of cancer follow three general patterns: sporadic, familial, and hereditary. While most cancer is sporadic, some cases appear to occur in family clusters. These cases are said to be familial and account for 10-20% of cancer cases. Familial cases may be due to a combination of shared genes and environmental factors among family members. In even fewer cases, the risk for cancer is inherited, and the genes responsible for the increased cancer risk are known.       Family histories typical of hereditary cancer syndromes usually include multiple first- and second-degree relatives diagnosed with cancer types that define a syndrome. These cases tend to be diagnosed at younger-than-expected ages and can be  bilateral or multifocal. The cancer in these families follows an autosomal dominant inheritance pattern, which indicates the likely presence of a mutation in a cancer susceptibility gene. Children and siblings of an individual believed to carry this mutation have a 50% chance of inheriting that mutation, thereby inheriting the increased risk to develop cancer. These mutations can be passed down from the maternal or the paternal lineage.     Due to Mr. Le's personal history of a BRCA2 pathogenic variant, we discussed the cancer risks associated with BRCA2. Hereditary breast cancer accounts for 5-10% of all cases of breast cancer. A significant proportion of hereditary breast cancer can be attributed to mutations in the BRCA1 and BRCA2 genes. Mutations in these genes confer an increased risk for breast cancer, ovarian cancer, male breast cancer, prostate cancer, and pancreatic cancer. Women with a BRCA2 mutation have over a 60% risk of breast cancer, and up to a 29% risk of ovarian cancer. Additionally, mutations in the BRCA2 confers an increased risk of pancreatic cancer (5-10%), male breast cancer (up to 7.1%), and prostate cancer (19-61%).      GENETIC TESTING:  The risks, benefits and limitations of multigene panel testing and implications for clinical management following testing were reviewed. DNA test results can influence decisions regarding screening, prevention and surgical management. Genetic testing can have significant psychological implications for both individuals and families. Also discussed was the possibility of employment and insurance discrimination based on genetic test results and the laws in place to prevent this. The implications of a positive or negative test result were discussed.     TEST RESULTS: Genetic testing identified the a pathogenic BRCA2 mutation ([id number]). This is causative of Hereditary Breast and Ovarian Cancer syndrome (HBOC). Genetic testing for the specific  BRCA2 mutation is indicated for other family members to determine whether they have inherited this risk factor. Mr. Le's sister has a 50% chance to have inherited this BRCA2 mutation. Testing is available for individuals who are 18 years of age or older.      Genetic counseling and testing for the known familial BRCA2 mutation are available to Mr. Le's at-risk family members. Until each at-risk family member has been proven not to carry a BRCA2 mutation, he or she should be offered increased surveillance. We would be happy to see family members who live in the area in our clinic to further discuss this information and testing options. For family members who live elsewhere, there are genetic counselors at most [facility]. They can find a genetic counselor by visiting the National Society of Genetic Counselors website at www.nsgc.org or they can call our office and we would be happy to give them the contact information of the closest genetic counselor.     Individuals with BRCA2 mutations may have an increased risk to have a child with Fanconi Anemia type D1, but only if their partner also carries a mutation in the BRCA2 gene. If both partners have a BRCA2 mutation, then they are at a 25% risk of having a child with Fanconi Anemia type D1. This is a rare disorder affecting multiple body systems and is inherited in an autosomal recessive manner.       CANCER RISK: For women, mutations in BRCA2 confer over a 60% lifetime risk of breast cancer, and a 13-29% lifetime risk of ovarian cancer. Additionally, there is as high as a 7.1% risk for male breast cancer and a 19-61% risk for prostate cancer in men. BRCA2 mutations have also been associated with increased risks for other types of cancer, including pancreatic cancer (5-10% lifetime risk), and ocular and cutaneous melanoma (<5% lifetime risk), per current NCCN guidelines (NCCN 2.2024).     CANCER SCREENING: Screening for males with BRCA2 mutations  should include prostate cancer screening beginning at age 40, self-breast exam, and clinical breast exam beginning at age 35. The most recent NCCN guidelines (2.2024) state that annual mammogram screening can be considered for males with a BRCA2 mutation, starting at age 50 or ten years before the earliest known male breast cancer in the family, whichever is first.     Given the risk for melanoma, annual skin and eye examinations could be considered for both males and females. There are no standardized screening tests that have been proven to be effective in early pancreatic cancer detection.  In cases where an individual has a BRCA2 mutation and family history of pancreatic cancer some experts consider screening with endoscopic ultrasound, high-resolution pancreatic protocol CT, or MRI, starting at age 50 or 10 years younger than the earliest diagnosis of pancreas cancer in the family. Since these screening methods are not standard of care, consideration of referral to a clinical research screening program is appropriate if a patient wishes to pursue screening.  Mr. Le does have a family history of pancreatic cancer and would like to have this screening.      PLAN:  Genetic counseling remains available to Mr. Le and his family. We discussed the [facility] clinic and Mr. Le would like to be seen there. Due to the family history of pancreatic cancer, we discussed pancreatic cancer screening. Mr. Le would like to be seen at the [facility] Clinic at the [facility] for pancreatic cancer screening. We will place this referral for him. Mr. Le is encouraged to contact us with any questions or concerns or if he decides he would like to be seen in the High Risk Breast Clinic at [phone].      Lakshmi Jimenez MS, [facility] [place] [facility]  Licensed Certified Genetic Counselor    Cc: Papo May DO

## 2024-03-18 ENCOUNTER — OFFICE VISIT (OUTPATIENT)
Dept: MAMMOGRAPHY | Facility: CLINIC | Age: 74
End: 2024-03-18
Payer: MEDICARE

## 2024-03-18 VITALS
WEIGHT: 223 LBS | HEIGHT: 72 IN | BODY MASS INDEX: 30.2 KG/M2 | OXYGEN SATURATION: 95 % | DIASTOLIC BLOOD PRESSURE: 100 MMHG | SYSTOLIC BLOOD PRESSURE: 159 MMHG | HEART RATE: 87 BPM

## 2024-03-18 DIAGNOSIS — Z15.09 BRCA2 GENE MUTATION POSITIVE IN MALE: Primary | ICD-10-CM

## 2024-03-18 DIAGNOSIS — Z12.31 ENCOUNTER FOR SCREENING MAMMOGRAM FOR MALIGNANT NEOPLASM OF BREAST: ICD-10-CM

## 2024-03-18 DIAGNOSIS — Z15.01 BRCA2 GENE MUTATION POSITIVE IN MALE: Primary | ICD-10-CM

## 2024-03-18 DIAGNOSIS — Z15.03 BRCA2 GENE MUTATION POSITIVE IN MALE: Primary | ICD-10-CM

## 2024-03-18 PROCEDURE — 1159F MED LIST DOCD IN RCRD: CPT | Performed by: SURGERY

## 2024-03-18 PROCEDURE — 99204 OFFICE O/P NEW MOD 45 MIN: CPT | Performed by: SURGERY

## 2024-03-18 PROCEDURE — 1160F RVW MEDS BY RX/DR IN RCRD: CPT | Performed by: SURGERY

## 2024-03-18 NOTE — PROGRESS NOTES
Chief Complaint: Krishna Le is a 73 y.o.. female here today for Consult        History of Present Illness:  Patient presents with management of breast cancer risk.   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  His mother  of pancreatic cancer.  He personally has had prostate cancer and his father had a melanoma.  Genetic testing was performed and returned showing that he can was positive for the BRCA2 mutation.    The patient did go to the [facility] and has had pancreatic screening which was negative.  He is in a surveillance program for that.    The patient has not had any breast imaging and no prior lumps have been noted.    Review of Systems:  Review of Systems   Skin:         The patient denies any noticeable changes to the skin of the breast.   All other systems reviewed and are negative.     I have reviewed the ROS as documented by the MA/LPN/RN Michael Hirsch MD      Past Medical and Surgical History:  Breast Biopsy History:  Patient has not had a breast biopsy in the past.  Breast Cancer HIstory:  Patient does not have a past medical history of breast cancer.  Breast Operations, and year:  0  Social History     Tobacco Use   Smoking Status Never   Smokeless Tobacco Never     Obstetric History:    Number of pregnancies:0  Number of live births: 0  Number of abortions or miscarriages: 0  Age of delivery of first child: 0  Patient did not breast feed.  Length of time taking birth control pills:0  Patient has never taken hormone replacement    Past Surgical History:   Procedure Laterality Date    CATARACT EXTRACTION Bilateral     CERVICAL FUSION      6, 7, 8    COLONOSCOPY      COLONOSCOPY N/A 2016    Procedure: COLONOSCOPY; POLYPECTOMY;  Surgeon: Colin Rojas MD;  Location: [facility] OR;  Service:     COLONOSCOPY N/A 2019    Procedure:  COLONOSCOPY, polypectomy;  Surgeon: Colin Rojas MD;  Location: [facility];  Service: Gastroenterology    HIGH INTENSITY FOCUSED ULTRASOUND TREATMENT PROSTATE N/A 9/22/2023    Procedure: HIGH INTENSITY FOCUSED ULTRASOUND WHOLE GLAND WITH SUPRAPUBIC CATHETER;  Surgeon: Ced Sheehan Jr., MD;  Location: [facility];  Service: Urology;  Laterality: N/A;       Past Medical History:   Diagnosis Date    Arthritis     Basal cell carcinoma     neck and shoulder    Cataract     Colon polyp     Diabetes     Type 2, boarderline. well controlled    Elevated cholesterol     Hay fever     Hearing impaired person, bilateral     wears hearing aids    Prostate cancer 11/6/2023       Prior Hospitalizations, other than for surgery or childbirth, and year:  None    Social History:  Patient is .  Patient has no children.    Family History:  Family History   Problem Relation Age of Onset    Cancer Mother         pancreatic    Cancer Father         polysythemia/skin cancer    Malig Hyperthermia Neg Hx        Vital Signs:  Vitals:    03/18/24 1319   BP: 159/100   Pulse: 87   SpO2: 95%       Medications:    Current Outpatient Prescriptions:     Current Outpatient Medications:     atorvastatin (LIPITOR) 80 MG tablet, Take 1 tablet by mouth Daily., Disp: 90 tablet, Rfl: 1    CALCIUM ACETATE-MAGNESIUM CARB PO, Take  by mouth., Disp: , Rfl:     Cetirizine HCl 10 MG capsule, Take  by mouth., Disp: , Rfl:     Cholecalciferol (VITAMIN D3) 125 MCG (5000 UT) capsule capsule, Take 2 capsules by mouth Daily., Disp: , Rfl:     Dupilumab 300 MG/2ML solution pen-injector, Inject 2 mL under the skin into the appropriate area as directed. 2 injections twice per month, Disp: , Rfl:     ezetimibe (ZETIA) 10 MG tablet, TAKE ONE TABLET BY MOUTH DAILY, Disp: 90 tablet, Rfl: 1    fenofibrate 160 MG tablet, Take 1 tablet by mouth Daily., Disp: 90 tablet, Rfl: 1    glucose blood test strip, Use as instructed to check blood sugar once daily,  Disp: 100 each, Rfl: 4    glucose monitor monitoring kit, Use to check blood sugar once daily, Disp: 1 each, Rfl: 0    Lancets Misc. kit, Use stylus and lancet to check blood sugar once daily, Disp: 100 each, Rfl: 4    metFORMIN (GLUCOPHAGE) 1000 MG tablet, TAKE ONE TABLET BY MOUTH TWICE A DAY WITH MEALS, Disp: 180 tablet, Rfl: 1    Milk Thistle 1000 MG capsule, Take  by mouth., Disp: , Rfl:     Misc Natural Products (GLUCOSAMINE CHOND MSM FORMULA PO), Take  by mouth., Disp: , Rfl:     Misc Natural Products (PRO NUTRIENTS FRUIT & VEGGIE PO), Take 3 capsules by mouth., Disp: , Rfl:     QUERCETIN PO, Take 1,000 mg by mouth., Disp: , Rfl:     Sodium Fluoride 5000 Sensitive 1.1-5 % paste, , Disp: , Rfl:     tamsulosin (FLOMAX) 0.4 MG capsule 24 hr capsule, Take 1 capsule by mouth Daily., Disp: , Rfl:     vitamin C (ASCORBIC ACID) 500 MG tablet, Take 1 tablet by mouth Daily., Disp: , Rfl:     Physical Examination:  General Appearance:   Patient is in no distress.  She is well kept and has a BMI of 30.2  Psychiatric:  Patient with appropriate mood and affect. Alert and oriented to self, time, and place.    Breast, RIGHT:  small sized, symmetric with the contralateral side.  Breast skin is without erythema, edema, rashes.  There are no visible abnormalities upon inspection during the arm-raising maneuver or with hands on hips in the sitting position. There is no nipple retraction, discharge or nipple/areolar skin changes.There are no masses palpable in the sitting or supine positions.  There are no signs suggestive of gynecomastia.    Breast, LEFT:  small sized, symmetric with the contralateral side.  Breast skin is without erythema, edema, rashes.  There are no visible abnormalities upon inspection during the arm-raising maneuver or with hands on hips in the sitting position. There is no nipple retraction, discharge or nipple/areolar skin changes.There are no masses palpable in the sitting or supine positions.  There are  no signs suggestive of gynecomastia.    Lymphatic:  There is no axillary, cervical, infraclavicular, or supraclavicular adenopathy bilaterally.    Gastrointestinal:  Abdomen is soft, nondistended, and nontender.  There was no obvious hepatosplenomegaly or abdominal mass.  There are no scars from previous surgery.    Musculoskeletal:  Good strength in all 4 extremities.   There is good range of motion in both shoulders.        Assessment:  1. BRCA2 gene mutation positive in male    We had a nice discussion regarding the implications of his BRCA2 mutation.  From the breast perspective he has a 1.8 to 7.1% lifetime risk of developing breast cancer.  He is also aware of the increased risk of pancreatic cancer, prostate cancer, and melanoma.  We would generally encourage beginning at the age of 35.  We would also recommend yearly clinical breast examination beginning at that age.  From the imaging perspective we would often suggest a mammogram beginning at the age of 50 or 10 years younger than the youngest male in the family with breast cancer particularly if gynecomastia was present.  The patient has no history of that.      Plan:  1.  I will alternate 6-month clinical examinations with Dr. Cazares.  Therefore, I plan to see him back in 1 year.  2.  I would like to get a baseline mammogram on him and orders were placed for that.  If that looks good, I think we can just follow him with clinical breast examination going forward.      CPT coding:    Next Appointment:  No follow-ups on file.            EMR Dragon/transcription disclaimer:    Much of this encounter note is an electronic transcription/translocation of spoken language to printed text.  The electronic translation of spoken language may permit erroneous, or at times, nonsensical words or phrases to be inadvertently transcribed.  Although I have reviewed the note from such areas, some may still exist.

## 2024-03-18 NOTE — LETTER
March 18, 2024     Papo Anand DO  [address] [facility] [address]    Patient: Krishna Le   YOB: 1950   Date of Visit: 3/18/2024     Dear Papo Anand DO:       Thank you for referring Krishna Le to me for evaluation. Below are the relevant portions of my assessment and plan of care.      Assessment:  1. BRCA2 gene mutation positive in male    We had a nice discussion regarding the implications of his BRCA2 mutation.  From the breast perspective he has a 1.8 to 7.1% lifetime risk of developing breast cancer.  He is also aware of the increased risk of pancreatic cancer, prostate cancer, and melanoma.  We would generally encourage beginning at the age of 35.  We would also recommend yearly clinical breast examination beginning at that age.  From the imaging perspective we would often suggest a mammogram beginning at the age of 50 or 10 years younger than the youngest male in the family with breast cancer particularly if gynecomastia was present.  The patient has no history of that.      Plan:  1.  I will alternate 6-month clinical examinations with Dr. Cazares.  Therefore, I plan to see him back in 1 year.  2.  I would like to get a baseline mammogram on him and orders were placed for that.  If that looks good, I think we can just follow him with clinical breast examination going forward.  If you have questions, please do not hesitate to call me. I look forward to following Krishna along with you.         Sincerely,        Michael Hirsch MD        CC: No Recipients    Michael Hirsch MD  03/18/24 1404  Sign when Signing Visit  Chief Complaint: Krishna Le is a 73 y.o.. female here today for Consult        History of Present Illness:  Patient presents with management of breast cancer risk.   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   His mother  of pancreatic cancer.  He personally has had prostate cancer and his father had a melanoma.  Genetic testing was performed and returned showing that he can was positive for the BRCA2 mutation.    The patient did go to the [facility] and has had pancreatic screening which was negative.  He is in a surveillance program for that.    The patient has not had any breast imaging and no prior lumps have been noted.    Review of Systems:  Review of Systems   Skin:         The patient denies any noticeable changes to the skin of the breast.   All other systems reviewed and are negative.     I have reviewed the ROS as documented by the MA/LPN/RN Michael Hirsch MD      Past Medical and Surgical History:  Breast Biopsy History:  Patient has not had a breast biopsy in the past.  Breast Cancer HIstory:  Patient does not have a past medical history of breast cancer.  Breast Operations, and year:  0  Social History     Tobacco Use   Smoking Status Never   Smokeless Tobacco Never     Obstetric History:    Number of pregnancies:0  Number of live births: 0  Number of abortions or miscarriages: 0  Age of delivery of first child: 0  Patient did not breast feed.  Length of time taking birth control pills:0  Patient has never taken hormone replacement    Past Surgical History:   Procedure Laterality Date   • CATARACT EXTRACTION Bilateral    • CERVICAL FUSION      6, 7, 8   • COLONOSCOPY     • COLONOSCOPY N/A 2016    Procedure: COLONOSCOPY; POLYPECTOMY;  Surgeon: Colin Rojas MD;  Location: [facility] OR;  Service:    • COLONOSCOPY N/A 2019    Procedure: COLONOSCOPY, polypectomy;  Surgeon: Colin Rojas MD;  Location: [facility] OR;  Service: Gastroenterology   • HIGH INTENSITY FOCUSED ULTRASOUND TREATMENT PROSTATE N/A 2023    Procedure: HIGH INTENSITY FOCUSED ULTRASOUND WHOLE GLAND WITH SUPRAPUBIC CATHETER;  Surgeon: Ced Sheehan Jr., MD;  Location: [facility] OR;   Service: Urology;  Laterality: N/A;       Past Medical History:   Diagnosis Date   • Arthritis    • Basal cell carcinoma     neck and shoulder   • Cataract    • Colon polyp    • Diabetes     Type 2, boarderline. well controlled   • Elevated cholesterol    • Hay fever    • Hearing impaired person, bilateral     wears hearing aids   • Prostate cancer 11/6/2023       Prior Hospitalizations, other than for surgery or childbirth, and year:  None    Social History:  Patient is .  Patient has no children.    Family History:  Family History   Problem Relation Age of Onset   • Cancer Mother         pancreatic   • Cancer Father         polysythemia/skin cancer   • Malig Hyperthermia Neg Hx        Vital Signs:  Vitals:    03/18/24 1319   BP: 159/100   Pulse: 87   SpO2: 95%       Medications:    Current Outpatient Prescriptions:     Current Outpatient Medications:   •  atorvastatin (LIPITOR) 80 MG tablet, Take 1 tablet by mouth Daily., Disp: 90 tablet, Rfl: 1  •  CALCIUM ACETATE-MAGNESIUM CARB PO, Take  by mouth., Disp: , Rfl:   •  Cetirizine HCl 10 MG capsule, Take  by mouth., Disp: , Rfl:   •  Cholecalciferol (VITAMIN D3) 125 MCG (5000 UT) capsule capsule, Take 2 capsules by mouth Daily., Disp: , Rfl:   •  Dupilumab 300 MG/2ML solution pen-injector, Inject 2 mL under the skin into the appropriate area as directed. 2 injections twice per month, Disp: , Rfl:   •  ezetimibe (ZETIA) 10 MG tablet, TAKE ONE TABLET BY MOUTH DAILY, Disp: 90 tablet, Rfl: 1  •  fenofibrate 160 MG tablet, Take 1 tablet by mouth Daily., Disp: 90 tablet, Rfl: 1  •  glucose blood test strip, Use as instructed to check blood sugar once daily, Disp: 100 each, Rfl: 4  •  glucose monitor monitoring kit, Use to check blood sugar once daily, Disp: 1 each, Rfl: 0  •  Lancets Misc. kit, Use stylus and lancet to check blood sugar once daily, Disp: 100 each, Rfl: 4  •  metFORMIN (GLUCOPHAGE) 1000 MG tablet, TAKE ONE TABLET BY MOUTH TWICE A DAY WITH MEALS,  Disp: 180 tablet, Rfl: 1  •  Milk Thistle 1000 MG capsule, Take  by mouth., Disp: , Rfl:   •  Misc Natural Products (GLUCOSAMINE CHOND MSM FORMULA PO), Take  by mouth., Disp: , Rfl:   •  Misc Natural Products (PRO NUTRIENTS FRUIT & VEGGIE PO), Take 3 capsules by mouth., Disp: , Rfl:   •  QUERCETIN PO, Take 1,000 mg by mouth., Disp: , Rfl:   •  Sodium Fluoride 5000 Sensitive 1.1-5 % paste, , Disp: , Rfl:   •  tamsulosin (FLOMAX) 0.4 MG capsule 24 hr capsule, Take 1 capsule by mouth Daily., Disp: , Rfl:   •  vitamin C (ASCORBIC ACID) 500 MG tablet, Take 1 tablet by mouth Daily., Disp: , Rfl:     Physical Examination:  General Appearance:   Patient is in no distress.  She is well kept and has a BMI of 30.2  Psychiatric:  Patient with appropriate mood and affect. Alert and oriented to self, time, and place.    Breast, RIGHT:  small sized, symmetric with the contralateral side.  Breast skin is without erythema, edema, rashes.  There are no visible abnormalities upon inspection during the arm-raising maneuver or with hands on hips in the sitting position. There is no nipple retraction, discharge or nipple/areolar skin changes.There are no masses palpable in the sitting or supine positions.  There are no signs suggestive of gynecomastia.    Breast, LEFT:  small sized, symmetric with the contralateral side.  Breast skin is without erythema, edema, rashes.  There are no visible abnormalities upon inspection during the arm-raising maneuver or with hands on hips in the sitting position. There is no nipple retraction, discharge or nipple/areolar skin changes.There are no masses palpable in the sitting or supine positions.  There are no signs suggestive of gynecomastia.    Lymphatic:  There is no axillary, cervical, infraclavicular, or supraclavicular adenopathy bilaterally.    Gastrointestinal:  Abdomen is soft, nondistended, and nontender.  There was no obvious hepatosplenomegaly or abdominal mass.  There are no scars from  previous surgery.    Musculoskeletal:  Good strength in all 4 extremities.   There is good range of motion in both shoulders.        Assessment:  1. BRCA2 gene mutation positive in male    We had a nice discussion regarding the implications of his BRCA2 mutation.  From the breast perspective he has a 1.8 to 7.1% lifetime risk of developing breast cancer.  He is also aware of the increased risk of pancreatic cancer, prostate cancer, and melanoma.  We would generally encourage beginning at the age of 35.  We would also recommend yearly clinical breast examination beginning at that age.  From the imaging perspective we would often suggest a mammogram beginning at the age of 50 or 10 years younger than the youngest male in the family with breast cancer particularly if gynecomastia was present.  The patient has no history of that.      Plan:  1.  I will alternate 6-month clinical examinations with Dr. Cazares.  Therefore, I plan to see him back in 1 year.  2.  I would like to get a baseline mammogram on him and orders were placed for that.  If that looks good, I think we can just follow him with clinical breast examination going forward.      CPT coding:    Next Appointment:  No follow-ups on file.            EMR Dragon/transcription disclaimer:    Much of this encounter note is an electronic transcription/translocation of spoken language to printed text.  The electronic translation of spoken language may permit erroneous, or at times, nonsensical words or phrases to be inadvertently transcribed.  Although I have reviewed the note from such areas, some may still exist.

## 2024-03-22 ENCOUNTER — PATIENT ROUNDING (BHMG ONLY) (OUTPATIENT)
Dept: MAMMOGRAPHY | Facility: CLINIC | Age: 74
End: 2024-03-22
Payer: MEDICARE

## 2024-03-22 NOTE — PROGRESS NOTES
March 22, 2024    Hello, may I speak with Krishna Le?    My name is Trung      I am  with [facility]  [address] [address] [phone].    Before we get started may I verify your date of birth? 1950    I am calling to officially welcome you to our practice and ask about your recent visit. Is this a good time to talk? yes    Tell me about your visit with us. What things went well?  Everything, doctor was great, his people were great and everything was fine.        We're always looking for ways to make our patients' experiences even better. Do you have recommendations on ways we may improve?  no    Overall were you satisfied with your first visit to our practice? yes       I appreciate you taking the time to speak with me today. Is there anything else I can do for you? no      Thank you, and have a great day.

## 2024-04-10 ENCOUNTER — HOSPITAL ENCOUNTER (OUTPATIENT)
Dept: MAMMOGRAPHY | Facility: HOSPITAL | Age: 74
Discharge: HOME | End: 2024-04-10
Admitting: SURGERY
Payer: MEDICARE

## 2024-04-10 DIAGNOSIS — Z15.01 BRCA2 GENE MUTATION POSITIVE IN MALE: ICD-10-CM

## 2024-04-10 DIAGNOSIS — Z15.03 BRCA2 GENE MUTATION POSITIVE IN MALE: ICD-10-CM

## 2024-04-10 DIAGNOSIS — Z15.09 BRCA2 GENE MUTATION POSITIVE IN MALE: ICD-10-CM

## 2024-04-10 DIAGNOSIS — Z12.31 ENCOUNTER FOR SCREENING MAMMOGRAM FOR MALIGNANT NEOPLASM OF BREAST: ICD-10-CM

## 2024-04-10 PROCEDURE — 77063 BREAST TOMOSYNTHESIS BI: CPT | Performed by: RADIOLOGY

## 2024-04-10 PROCEDURE — 77067 SCR MAMMO BI INCL CAD: CPT | Performed by: RADIOLOGY

## 2024-04-10 PROCEDURE — 77063 BREAST TOMOSYNTHESIS BI: CPT

## 2024-04-10 PROCEDURE — 77067 SCR MAMMO BI INCL CAD: CPT

## 2024-04-12 ENCOUNTER — DOCUMENTATION (OUTPATIENT)
Dept: MAMMOGRAPHY | Facility: CLINIC | Age: 74
End: 2024-04-12
Payer: MEDICARE

## 2024-04-20 DIAGNOSIS — E11.9 TYPE 2 DIABETES MELLITUS WITHOUT COMPLICATION, WITHOUT LONG-TERM CURRENT USE OF INSULIN: ICD-10-CM

## 2024-04-20 DIAGNOSIS — E78.2 MIXED HYPERLIPIDEMIA: ICD-10-CM

## 2024-04-22 RX ORDER — GLIMEPIRIDE 2 MG/1
TABLET ORAL
Qty: 90 TABLET | Refills: 0 | Status: SHIPPED | OUTPATIENT
Start: 2024-04-22

## 2024-04-22 RX ORDER — EZETIMIBE 10 MG/1
10 TABLET ORAL DAILY
Qty: 90 TABLET | Refills: 0 | Status: SHIPPED | OUTPATIENT
Start: 2024-04-22

## 2024-06-10 DIAGNOSIS — E11.9 TYPE 2 DIABETES MELLITUS WITHOUT COMPLICATION, WITHOUT LONG-TERM CURRENT USE OF INSULIN: Primary | ICD-10-CM

## 2024-06-13 ENCOUNTER — TELEPHONE (OUTPATIENT)
Dept: GASTROENTEROLOGY | Facility: CLINIC | Age: 74
End: 2024-06-13
Payer: MEDICARE

## 2024-06-13 NOTE — TELEPHONE ENCOUNTER
FAST TRACK - 5 YEAR RECALL 08/2024  LAST COLONOSCOPY 08/16/2019  PERSONAL HISTORY POLYPS  NO FAMILY HISTORY CRC/P  SCHEDULE AT [place].

## 2024-06-14 DIAGNOSIS — Z86.010 PERSONAL HISTORY OF COLONIC POLYPS: Primary | ICD-10-CM

## 2024-06-14 NOTE — TELEPHONE ENCOUNTER
Spoke with patient.  Scheduled at [place] 08/21/2024 at 12:30pm.  Email prep instructions.    [email or web address]

## 2024-06-17 PROBLEM — Z86.0100 PERSONAL HISTORY OF COLONIC POLYPS: Status: ACTIVE | Noted: 2024-06-14

## 2024-06-17 PROBLEM — Z86.010 PERSONAL HISTORY OF COLONIC POLYPS: Status: ACTIVE | Noted: 2024-06-14

## 2024-06-20 ENCOUNTER — HOSPITAL ENCOUNTER (OUTPATIENT)
Dept: GENERAL RADIOLOGY | Facility: HOSPITAL | Age: 74
Discharge: HOME | End: 2024-06-20
Admitting: FAMILY MEDICINE
Payer: MEDICARE

## 2024-06-20 ENCOUNTER — OFFICE VISIT (OUTPATIENT)
Dept: FAMILY MEDICINE CLINIC | Facility: CLINIC | Age: 74
End: 2024-06-20
Payer: MEDICARE

## 2024-06-20 VITALS
HEART RATE: 78 BPM | OXYGEN SATURATION: 94 % | TEMPERATURE: 96.9 F | WEIGHT: 233 LBS | DIASTOLIC BLOOD PRESSURE: 80 MMHG | SYSTOLIC BLOOD PRESSURE: 102 MMHG | BODY MASS INDEX: 31.56 KG/M2 | HEIGHT: 72 IN

## 2024-06-20 DIAGNOSIS — E55.9 VITAMIN D DEFICIENCY: ICD-10-CM

## 2024-06-20 DIAGNOSIS — E66.09 EXOGENOUS OBESITY: ICD-10-CM

## 2024-06-20 DIAGNOSIS — Z85.46 HISTORY OF PROSTATE CANCER: ICD-10-CM

## 2024-06-20 DIAGNOSIS — E11.9 TYPE 2 DIABETES MELLITUS WITHOUT COMPLICATION, WITHOUT LONG-TERM CURRENT USE OF INSULIN: ICD-10-CM

## 2024-06-20 DIAGNOSIS — R06.02 SHORTNESS OF BREATH: ICD-10-CM

## 2024-06-20 DIAGNOSIS — E78.2 MIXED HYPERLIPIDEMIA: Primary | ICD-10-CM

## 2024-06-20 PROCEDURE — 1160F RVW MEDS BY RX/DR IN RCRD: CPT | Performed by: FAMILY MEDICINE

## 2024-06-20 PROCEDURE — 1159F MED LIST DOCD IN RCRD: CPT | Performed by: FAMILY MEDICINE

## 2024-06-20 PROCEDURE — 99214 OFFICE O/P EST MOD 30 MIN: CPT | Performed by: FAMILY MEDICINE

## 2024-06-20 PROCEDURE — 1125F AMNT PAIN NOTED PAIN PRSNT: CPT | Performed by: FAMILY MEDICINE

## 2024-06-20 PROCEDURE — 3044F HG A1C LEVEL LT 7.0%: CPT | Performed by: FAMILY MEDICINE

## 2024-06-20 PROCEDURE — 71046 X-RAY EXAM CHEST 2 VIEWS: CPT

## 2024-06-21 PROBLEM — Z85.46 HISTORY OF PROSTATE CANCER: Status: ACTIVE | Noted: 2023-11-06

## 2024-06-21 RX ORDER — FENOFIBRATE 160 MG/1
160 TABLET ORAL DAILY
Qty: 90 TABLET | Refills: 1 | Status: SHIPPED | OUTPATIENT
Start: 2024-06-21

## 2024-06-21 RX ORDER — ATORVASTATIN CALCIUM 80 MG/1
80 TABLET, FILM COATED ORAL DAILY
Qty: 90 TABLET | Refills: 1 | Status: SHIPPED | OUTPATIENT
Start: 2024-06-21

## 2024-06-21 RX ORDER — EZETIMIBE 10 MG/1
10 TABLET ORAL DAILY
Qty: 90 TABLET | Refills: 1 | Status: SHIPPED | OUTPATIENT
Start: 2024-06-21

## 2024-06-21 RX ORDER — LEVOFLOXACIN 500 MG/1
500 TABLET, FILM COATED ORAL DAILY
Qty: 10 TABLET | Refills: 0 | Status: SHIPPED | OUTPATIENT
Start: 2024-06-21

## 2024-06-21 NOTE — PROGRESS NOTES
Subjective   Krishna Le is a 73 y.o. male with   Chief Complaint   Patient presents with    Follow-up     LABS   .    History of Present Illness   73-year-old white male with multiple medical issues here for further medical management.  Patient known history of hyperlipidemia, type II non-insulin-dependent diabetes mellitus as well as BPH with outflow obstruction.  He is also a prostate cancer survivor.  He is followed by Dr. Weir of the urology service.  Patient also with history of hypothyroidism, erectile dysfunction.  Current medications include atorvastatin combined with Zetia and fenofibrate.  He is also using metformin, tamsulosin.  He also receives Dupixent for his severe allergies.  All medications and supplements are used appropriately and are well-tolerated without side effects.  Fasting labs have been acquired prior to this visit.  Patient does complain of easy shortness of air and at times dyspnea on exertion.  There has been no chest pain but stamina seems to be decreased.  This seemed to occur after his last bout of COVID.  He is unsure as to whether this did something to his lungs or cardiac issues.  Patient did undergo a stress echo in April 2021.  He however has a very strong family history of cardiovascular disease specially CAD.  The following portions of the patient's history were reviewed and updated as appropriate: allergies, current medications, past family history, past medical history, past social history, past surgical history and problem list.    Review of Systems   Respiratory:  Positive for shortness of breath.    Cardiovascular:         Hyperlipidemia   Endocrine:        Exogenous obesity, vitamin D deficiency, type II non-insulin-dependent diabetes mellitus, acquired hypothyroidism   Genitourinary:         Prostate cancer, BPH with outflow obstruction, erectile dysfunction   Allergic/Immunologic: Positive for environmental allergies.       Objective     Vitals:    06/20/24  1420   BP: 102/80   Pulse: 78   Temp: 96.9 °F (36.1 °C)   SpO2: 94%       Recent Results (from the past 672 hour(s))   Lipid panel    Collection Time: 06/11/24  9:40 AM    Specimen: Blood   Result Value Ref Range    Total Cholesterol 136 0 - 200 mg/dL    Triglycerides 69 0 - 150 mg/dL    HDL Cholesterol 49 40 - 60 mg/dL    VLDL Cholesterol Uzair 14 5 - 40 mg/dL    LDL Chol Calc (NIH) 73 0 - 100 mg/dL   Comprehensive metabolic panel    Collection Time: 06/11/24  9:40 AM    Specimen: Blood   Result Value Ref Range    Glucose 113 (H) 65 - 99 mg/dL    BUN 22 8 - 23 mg/dL    Creatinine 1.17 0.76 - 1.27 mg/dL    EGFR Result 65.4 >60.0 mL/min/1.73    BUN/Creatinine Ratio 18.8 7.0 - 25.0    Sodium 138 136 - 145 mmol/L    Potassium 4.8 3.5 - 5.2 mmol/L    Chloride 103 98 - 107 mmol/L    Total CO2 25.0 22.0 - 29.0 mmol/L    Calcium 9.9 8.6 - 10.5 mg/dL    Total Protein 7.6 6.0 - 8.5 g/dL    Albumin 4.3 3.5 - 5.2 g/dL    Globulin 3.3 gm/dL    A/G Ratio 1.3 g/dL    Total Bilirubin 0.5 0.0 - 1.2 mg/dL    Alkaline Phosphatase 30 (L) 39 - 117 U/L    AST (SGOT) 35 1 - 40 U/L    ALT (SGPT) 44 (H) 1 - 41 U/L   TSH    Collection Time: 06/11/24  9:40 AM    Specimen: Blood   Result Value Ref Range    TSH 3.620 0.270 - 4.200 uIU/mL   Hemoglobin A1c    Collection Time: 06/11/24  9:40 AM    Specimen: Blood   Result Value Ref Range    Hemoglobin A1C 6.50 (H) 4.80 - 5.60 %   Vitamin D 25 hydroxy    Collection Time: 06/11/24  9:40 AM    Specimen: Blood   Result Value Ref Range    25 Hydroxy, Vitamin D 69.8 30.0 - 100.0 ng/ml   CBC w AUTO Differential    Collection Time: 06/11/24  9:40 AM    Specimen: Blood   Result Value Ref Range    WBC 5.55 3.40 - 10.80 10*3/mm3    RBC 4.89 4.14 - 5.80 10*6/mm3    Hemoglobin 14.3 13.0 - 17.7 g/dL    Hematocrit 44.3 37.5 - 51.0 %    MCV 90.6 79.0 - 97.0 fL    MCH 29.2 26.6 - 33.0 pg    MCHC 32.3 31.5 - 35.7 g/dL    RDW 13.1 12.3 - 15.4 %    Platelets 285 140 - 450 10*3/mm3    Neutrophil Rel % 59.2 42.7 - 76.0  %    Lymphocyte Rel % 28.5 19.6 - 45.3 %    Monocyte Rel % 8.5 5.0 - 12.0 %    Eosinophil Rel % 2.3 0.3 - 6.2 %    Basophil Rel % 1.3 0.0 - 1.5 %    Neutrophils Absolute 3.29 1.70 - 7.00 10*3/mm3    Lymphocytes Absolute 1.58 0.70 - 3.10 10*3/mm3    Monocytes Absolute 0.47 0.10 - 0.90 10*3/mm3    Eosinophils Absolute 0.13 0.00 - 0.40 10*3/mm3    Basophils Absolute 0.07 0.00 - 0.20 10*3/mm3    Immature Granulocyte Rel % 0.2 0.0 - 0.5 %    Immature Grans Absolute 0.01 0.00 - 0.05 10*3/mm3    nRBC 0.0 0.0 - 0.2 /100 WBC   Microalbumin / Creatinine Urine Ratio -    Collection Time: 06/11/24  9:40 AM   Result Value Ref Range    Creatinine, Urine 207.8 Not Estab. mg/dL    Microalbumin, Urine 60.9 Not Estab. ug/mL    Microalbumin/Creatinine Ratio 29 0 - 29 mg/g creat       Physical Exam  Vitals and nursing note reviewed.   Constitutional:       Appearance: Normal appearance. He is well-developed and well-groomed. He is obese.      Comments: Exogenous obesity with a BMI of 31.6   HENT:      Head: Normocephalic and atraumatic.   Neck:      Thyroid: No thyroid mass or thyromegaly.      Vascular: Normal carotid pulses. No carotid bruit.      Trachea: Trachea and phonation normal.   Cardiovascular:      Rate and Rhythm: Normal rate and regular rhythm.      Heart sounds: Normal heart sounds. No murmur heard.     No friction rub. No gallop.   Pulmonary:      Effort: Pulmonary effort is normal. No respiratory distress.      Breath sounds: Normal breath sounds. No decreased breath sounds, wheezing, rhonchi or rales.   Musculoskeletal:      Cervical back: Neck supple.   Lymphadenopathy:      Cervical: No cervical adenopathy.   Skin:     General: Skin is warm and dry.      Findings: No rash.   Neurological:      Mental Status: He is alert and oriented to person, place, and time.   Psychiatric:         Attention and Perception: Attention and perception normal.         Mood and Affect: Mood and affect normal.         Speech: Speech  normal.         Behavior: Behavior normal. Behavior is cooperative.         Thought Content: Thought content normal.         Cognition and Memory: Cognition and memory normal.         Judgment: Judgment normal.         Assessment & Plan   Diagnoses and all orders for this visit:    1. Mixed hyperlipidemia (Primary)  -     ezetimibe (ZETIA) 10 MG tablet; Take 1 tablet by mouth Daily.  Dispense: 90 tablet; Refill: 1  -     fenofibrate 160 MG tablet; Take 1 tablet by mouth Daily.  Dispense: 90 tablet; Refill: 1  -     atorvastatin (LIPITOR) 80 MG tablet; Take 1 tablet by mouth Daily.  Dispense: 90 tablet; Refill: 1  -     Comprehensive metabolic panel; Future  -     Vitamin D 25 hydroxy; Future  -     TSH; Future  -     Lipid panel; Future  -     Hemoglobin A1c; Future  -     CBC w AUTO Differential; Future    2. Shortness of breath  -     XR Chest PA & Lateral  -     Comprehensive metabolic panel; Future  -     Vitamin D 25 hydroxy; Future  -     TSH; Future  -     Lipid panel; Future  -     Hemoglobin A1c; Future  -     CBC w AUTO Differential; Future    3. Exogenous obesity  -     Comprehensive metabolic panel; Future  -     Vitamin D 25 hydroxy; Future  -     TSH; Future  -     Lipid panel; Future  -     Hemoglobin A1c; Future  -     CBC w AUTO Differential; Future    4. Type 2 diabetes mellitus without complication, without long-term current use of insulin  -     metFORMIN (GLUCOPHAGE) 1000 MG tablet; Take 1 tablet by mouth 2 (Two) Times a Day With Meals.  Dispense: 180 tablet; Refill: 1  -     Comprehensive metabolic panel; Future  -     Vitamin D 25 hydroxy; Future  -     TSH; Future  -     Lipid panel; Future  -     Hemoglobin A1c; Future  -     CBC w AUTO Differential; Future    5. Vitamin D deficiency  -     Comprehensive metabolic panel; Future  -     Vitamin D 25 hydroxy; Future  -     TSH; Future  -     Lipid panel; Future  -     Hemoglobin A1c; Future  -     CBC w AUTO Differential; Future    6. History  of prostate cancer  -     Comprehensive metabolic panel; Future  -     Vitamin D 25 hydroxy; Future  -     TSH; Future  -     Lipid panel; Future  -     Hemoglobin A1c; Future  -     CBC w AUTO Differential; Future    Await results of chest x-ray.  Patient may require full PFTs.  Cannot rule out the need to rule consult cardiology if the above is unremarkable.  Current medications will continue without alteration.  Weight loss would be beneficial.  Anticipate repeat fasting labs in 6 months with follow-up with me thereafter.    Return in about 6 months (around 12/20/2024) for Recheck.

## 2024-07-01 ENCOUNTER — OFFICE VISIT (OUTPATIENT)
Dept: FAMILY MEDICINE CLINIC | Facility: CLINIC | Age: 74
End: 2024-07-01
Payer: MEDICARE

## 2024-07-01 VITALS
BODY MASS INDEX: 31.42 KG/M2 | HEIGHT: 72 IN | DIASTOLIC BLOOD PRESSURE: 78 MMHG | HEART RATE: 81 BPM | TEMPERATURE: 97.3 F | OXYGEN SATURATION: 95 % | WEIGHT: 232 LBS | SYSTOLIC BLOOD PRESSURE: 122 MMHG

## 2024-07-01 DIAGNOSIS — J18.9 PNEUMONIA OF LEFT LOWER LOBE DUE TO INFECTIOUS ORGANISM: Primary | ICD-10-CM

## 2024-07-01 PROCEDURE — 99213 OFFICE O/P EST LOW 20 MIN: CPT | Performed by: FAMILY MEDICINE

## 2024-07-01 PROCEDURE — 3044F HG A1C LEVEL LT 7.0%: CPT | Performed by: FAMILY MEDICINE

## 2024-07-01 PROCEDURE — 1125F AMNT PAIN NOTED PAIN PRSNT: CPT | Performed by: FAMILY MEDICINE

## 2024-07-01 PROCEDURE — 1160F RVW MEDS BY RX/DR IN RCRD: CPT | Performed by: FAMILY MEDICINE

## 2024-07-01 PROCEDURE — 1159F MED LIST DOCD IN RCRD: CPT | Performed by: FAMILY MEDICINE

## 2024-07-01 NOTE — PROGRESS NOTES
Subjective   Krishna Le is a 73 y.o. male with   Chief Complaint   Patient presents with    Pneumonia     1 week follow up, feeling a little better   .    Pneumonia       73-year-old white male here in follow-up from recent diagnosis of left lower lobe pneumonia.  Patient was seen 1 week ago for his semiannual visit.  While here he expressed an ongoing nonproductive cough with some shortness of breath.  Lung auscultation took place with identified abnormal sounds in the left lower lobe.  Chest x-ray was obtained with early infiltrate identified.  Patient was started on Levaquin and has finished a 1 week course.  He states that he is much better with no evidence of shortness of breath at this time.  The cough for the most part has resolved and energy is returning.  He has been having neck issues and is status post neck fusion.  His neck surgeon has retired and he is in contact with his orthopedic surgeon to help plug him into a replacement.  Overall however he is doing well and is without acute complaint.  The following portions of the patient's history were reviewed and updated as appropriate: allergies, current medications, past family history, past medical history, past social history, past surgical history and problem list.    Review of Systems   Respiratory:          Left lower lobe pneumonia   Musculoskeletal:  Positive for neck pain.       Objective     Vitals:    07/01/24 1501   BP: 122/78   Pulse:    Temp:    SpO2:        Recent Results (from the past 672 hour(s))   Lipid panel    Collection Time: 06/11/24  9:40 AM    Specimen: Blood   Result Value Ref Range    Total Cholesterol 136 0 - 200 mg/dL    Triglycerides 69 0 - 150 mg/dL    HDL Cholesterol 49 40 - 60 mg/dL    VLDL Cholesterol Uzair 14 5 - 40 mg/dL    LDL Chol Calc ([facility]) 73 0 - 100 mg/dL   Comprehensive metabolic panel    Collection Time: 06/11/24  9:40 AM    Specimen: Blood   Result Value Ref Range    Glucose 113 (H) 65 - 99 mg/dL    BUN 22 8 -  23 mg/dL    Creatinine 1.17 0.76 - 1.27 mg/dL    EGFR Result 65.4 >60.0 mL/min/1.73    BUN/Creatinine Ratio 18.8 7.0 - 25.0    Sodium 138 136 - 145 mmol/L    Potassium 4.8 3.5 - 5.2 mmol/L    Chloride 103 98 - 107 mmol/L    Total CO2 25.0 22.0 - 29.0 mmol/L    Calcium 9.9 8.6 - 10.5 mg/dL    Total Protein 7.6 6.0 - 8.5 g/dL    Albumin 4.3 3.5 - 5.2 g/dL    Globulin 3.3 gm/dL    A/G Ratio 1.3 g/dL    Total Bilirubin 0.5 0.0 - 1.2 mg/dL    Alkaline Phosphatase 30 (L) 39 - 117 U/L    AST (SGOT) 35 1 - 40 U/L    ALT (SGPT) 44 (H) 1 - 41 U/L   TSH    Collection Time: 06/11/24  9:40 AM    Specimen: Blood   Result Value Ref Range    TSH 3.620 0.270 - 4.200 uIU/mL   Hemoglobin A1c    Collection Time: 06/11/24  9:40 AM    Specimen: Blood   Result Value Ref Range    Hemoglobin A1C 6.50 (H) 4.80 - 5.60 %   Vitamin D 25 hydroxy    Collection Time: 06/11/24  9:40 AM    Specimen: Blood   Result Value Ref Range    25 Hydroxy, Vitamin D 69.8 30.0 - 100.0 ng/ml   CBC w AUTO Differential    Collection Time: 06/11/24  9:40 AM    Specimen: Blood   Result Value Ref Range    WBC 5.55 3.40 - 10.80 10*3/mm3    RBC 4.89 4.14 - 5.80 10*6/mm3    Hemoglobin 14.3 13.0 - 17.7 g/dL    Hematocrit 44.3 37.5 - 51.0 %    MCV 90.6 79.0 - 97.0 fL    MCH 29.2 26.6 - 33.0 pg    MCHC 32.3 31.5 - 35.7 g/dL    RDW 13.1 12.3 - 15.4 %    Platelets 285 140 - 450 10*3/mm3    Neutrophil Rel % 59.2 42.7 - 76.0 %    Lymphocyte Rel % 28.5 19.6 - 45.3 %    Monocyte Rel % 8.5 5.0 - 12.0 %    Eosinophil Rel % 2.3 0.3 - 6.2 %    Basophil Rel % 1.3 0.0 - 1.5 %    Neutrophils Absolute 3.29 1.70 - 7.00 10*3/mm3    Lymphocytes Absolute 1.58 0.70 - 3.10 10*3/mm3    Monocytes Absolute 0.47 0.10 - 0.90 10*3/mm3    Eosinophils Absolute 0.13 0.00 - 0.40 10*3/mm3    Basophils Absolute 0.07 0.00 - 0.20 10*3/mm3    Immature Granulocyte Rel % 0.2 0.0 - 0.5 %    Immature Grans Absolute 0.01 0.00 - 0.05 10*3/mm3    nRBC 0.0 0.0 - 0.2 /100 WBC   Microalbumin / Creatinine Urine Ratio -     Collection Time: 06/11/24  9:40 AM   Result Value Ref Range    Creatinine, Urine 207.8 Not Estab. mg/dL    Microalbumin, Urine 60.9 Not Estab. ug/mL    Microalbumin/Creatinine Ratio 29 0 - 29 mg/g creat       Physical Exam  Vitals and nursing note reviewed.   Constitutional:       Appearance: Normal appearance. He is well-developed and well-groomed.   HENT:      Head: Normocephalic and atraumatic.   Neck:      Thyroid: No thyroid mass or thyromegaly.      Vascular: Normal carotid pulses. No carotid bruit.      Trachea: Trachea and phonation normal.   Cardiovascular:      Rate and Rhythm: Normal rate and regular rhythm.      Heart sounds: Normal heart sounds. No murmur heard.     No friction rub. No gallop.   Pulmonary:      Effort: Pulmonary effort is normal. No respiratory distress.      Breath sounds: Normal breath sounds. No decreased breath sounds, wheezing, rhonchi or rales.      Comments: Lungs are now completely clear in all lung fields with auscultation.  Musculoskeletal:      Cervical back: Neck supple.   Lymphadenopathy:      Cervical: No cervical adenopathy.   Skin:     General: Skin is warm and dry.      Findings: No rash.   Neurological:      Mental Status: He is alert and oriented to person, place, and time.   Psychiatric:         Attention and Perception: Attention and perception normal.         Mood and Affect: Mood and affect normal.         Speech: Speech normal.         Behavior: Behavior normal. Behavior is cooperative.         Thought Content: Thought content normal.         Cognition and Memory: Cognition and memory normal.         Judgment: Judgment normal.         Assessment & Plan   Diagnoses and all orders for this visit:    1. Pneumonia of left lower lobe due to infectious organism (Primary)  -     XR Chest PA & Lateral; Future    Increase activity as tolerated.  Patient will contact this office with any adverse change in his condition.  Follow-up will be as previously scheduled or on  an as-needed basis.    Return for Next scheduled follow up.

## 2024-07-05 ENCOUNTER — HOSPITAL ENCOUNTER (OUTPATIENT)
Dept: GENERAL RADIOLOGY | Facility: HOSPITAL | Age: 74
Discharge: HOME | End: 2024-07-05
Payer: MEDICARE

## 2024-07-05 DIAGNOSIS — J18.9 PNEUMONIA OF LEFT LOWER LOBE DUE TO INFECTIOUS ORGANISM: ICD-10-CM

## 2024-07-05 PROCEDURE — 71046 X-RAY EXAM CHEST 2 VIEWS: CPT

## 2024-07-24 DIAGNOSIS — E11.9 TYPE 2 DIABETES MELLITUS WITHOUT COMPLICATION, WITHOUT LONG-TERM CURRENT USE OF INSULIN: ICD-10-CM

## 2024-07-24 RX ORDER — GLIMEPIRIDE 2 MG/1
TABLET ORAL
Qty: 90 TABLET | Refills: 0 | OUTPATIENT
Start: 2024-07-24

## 2024-08-20 ENCOUNTER — ANESTHESIA EVENT (OUTPATIENT)
Dept: PERIOP | Facility: HOSPITAL | Age: 74
End: 2024-08-20
Payer: MEDICARE

## 2024-08-21 ENCOUNTER — ANESTHESIA (OUTPATIENT)
Dept: PERIOP | Facility: HOSPITAL | Age: 74
End: 2024-08-21
Payer: MEDICARE

## 2024-08-21 ENCOUNTER — HOSPITAL ENCOUNTER (OUTPATIENT)
Facility: HOSPITAL | Age: 74
Setting detail: HOSPITAL OUTPATIENT SURGERY
Discharge: HOME | End: 2024-08-21
Attending: INTERNAL MEDICINE | Admitting: INTERNAL MEDICINE
Payer: MEDICARE

## 2024-08-21 VITALS
SYSTOLIC BLOOD PRESSURE: 152 MMHG | OXYGEN SATURATION: 94 % | HEART RATE: 63 BPM | HEIGHT: 72 IN | RESPIRATION RATE: 12 BRPM | DIASTOLIC BLOOD PRESSURE: 94 MMHG | WEIGHT: 224.2 LBS | BODY MASS INDEX: 30.37 KG/M2 | TEMPERATURE: 97.8 F

## 2024-08-21 DIAGNOSIS — Z86.010 PERSONAL HISTORY OF COLONIC POLYPS: ICD-10-CM

## 2024-08-21 LAB — GLUCOSE BLDC GLUCOMTR-MCNC: 111 MG/DL (ref 70–130)

## 2024-08-21 PROCEDURE — 88305 TISSUE EXAM BY PATHOLOGIST: CPT | Performed by: INTERNAL MEDICINE

## 2024-08-21 PROCEDURE — 25810000003 LACTATED RINGERS PER 1000 ML: Performed by: NURSE ANESTHETIST, CERTIFIED REGISTERED

## 2024-08-21 PROCEDURE — 82948 REAGENT STRIP/BLOOD GLUCOSE: CPT

## 2024-08-21 PROCEDURE — 25010000002 PROPOFOL 200 MG/20ML EMULSION: Performed by: NURSE ANESTHETIST, CERTIFIED REGISTERED

## 2024-08-21 RX ORDER — SODIUM CHLORIDE 0.9 % (FLUSH) 0.9 %
10 SYRINGE (ML) INJECTION AS NEEDED
Status: DISCONTINUED | OUTPATIENT
Start: 2024-08-21 | End: 2024-08-22 | Stop reason: HOSPADM

## 2024-08-21 RX ORDER — LIDOCAINE HYDROCHLORIDE 10 MG/ML
0.5 INJECTION, SOLUTION INFILTRATION; PERINEURAL ONCE AS NEEDED
Status: DISCONTINUED | OUTPATIENT
Start: 2024-08-21 | End: 2024-08-22 | Stop reason: HOSPADM

## 2024-08-21 RX ORDER — SODIUM CHLORIDE, SODIUM LACTATE, POTASSIUM CHLORIDE, CALCIUM CHLORIDE 600; 310; 30; 20 MG/100ML; MG/100ML; MG/100ML; MG/100ML
9 INJECTION, SOLUTION INTRAVENOUS CONTINUOUS PRN
Status: DISCONTINUED | OUTPATIENT
Start: 2024-08-21 | End: 2024-08-22 | Stop reason: HOSPADM

## 2024-08-21 RX ORDER — PROPOFOL 10 MG/ML
INJECTION, EMULSION INTRAVENOUS AS NEEDED
Status: DISCONTINUED | OUTPATIENT
Start: 2024-08-21 | End: 2024-08-21 | Stop reason: SURG

## 2024-08-21 RX ORDER — SODIUM CHLORIDE 0.9 % (FLUSH) 0.9 %
10 SYRINGE (ML) INJECTION EVERY 12 HOURS SCHEDULED
Status: DISCONTINUED | OUTPATIENT
Start: 2024-08-21 | End: 2024-08-22 | Stop reason: HOSPADM

## 2024-08-21 RX ORDER — LIDOCAINE HYDROCHLORIDE 20 MG/ML
INJECTION, SOLUTION INFILTRATION; PERINEURAL AS NEEDED
Status: DISCONTINUED | OUTPATIENT
Start: 2024-08-21 | End: 2024-08-21 | Stop reason: SURG

## 2024-08-21 RX ORDER — ONDANSETRON 2 MG/ML
4 INJECTION INTRAMUSCULAR; INTRAVENOUS ONCE AS NEEDED
Status: DISCONTINUED | OUTPATIENT
Start: 2024-08-21 | End: 2024-08-22 | Stop reason: HOSPADM

## 2024-08-21 RX ORDER — SODIUM CHLORIDE, SODIUM LACTATE, POTASSIUM CHLORIDE, CALCIUM CHLORIDE 600; 310; 30; 20 MG/100ML; MG/100ML; MG/100ML; MG/100ML
100 INJECTION, SOLUTION INTRAVENOUS ONCE
Status: DISCONTINUED | OUTPATIENT
Start: 2024-08-21 | End: 2024-08-22 | Stop reason: HOSPADM

## 2024-08-21 RX ADMIN — PROPOFOL 50 MG: 10 INJECTION, EMULSION INTRAVENOUS at 13:27

## 2024-08-21 RX ADMIN — PROPOFOL 100 MG: 10 INJECTION, EMULSION INTRAVENOUS at 13:25

## 2024-08-21 RX ADMIN — PROPOFOL 50 MG: 10 INJECTION, EMULSION INTRAVENOUS at 13:33

## 2024-08-21 RX ADMIN — PROPOFOL 50 MG: 10 INJECTION, EMULSION INTRAVENOUS at 13:29

## 2024-08-21 RX ADMIN — LIDOCAINE HYDROCHLORIDE 80 MG: 20 INJECTION, SOLUTION INFILTRATION; PERINEURAL at 13:25

## 2024-08-21 RX ADMIN — PROPOFOL 50 MG: 10 INJECTION, EMULSION INTRAVENOUS at 13:37

## 2024-08-21 RX ADMIN — PROPOFOL 50 MG: 10 INJECTION, EMULSION INTRAVENOUS at 13:41

## 2024-08-21 RX ADMIN — PROPOFOL 50 MG: 10 INJECTION, EMULSION INTRAVENOUS at 13:45

## 2024-08-21 RX ADMIN — SODIUM CHLORIDE, POTASSIUM CHLORIDE, SODIUM LACTATE AND CALCIUM CHLORIDE 9 ML/HR: 600; 310; 30; 20 INJECTION, SOLUTION INTRAVENOUS at 12:01

## 2024-08-21 NOTE — BRIEF OP NOTE
COLONOSCOPY WITH POLYPECTOMY  Progress Note    Krishna Le  8/21/2024    Pre-op Diagnosis:   Personal history of colonic polyps [Z86.010]       Post-Op Diagnosis Codes:     * Personal history of colonic polyps [Z86.010]     * Diverticulosis [K57.90]     * Colon polyp [K63.5]    Procedure/CPT® Codes:        Procedure(s):  COLONOSCOPY WITH POLYPECTOMY              Surgeon(s):  Colin Rojas MD    Anesthesia: Monitored Anesthesia Care    Staff:   Circulator: Brandon Wilcox RN  Scrub Person: Courtney Dennis MA         Estimated Blood Loss: none    Urine Voided: * No values recorded between 8/21/2024  1:19 PM and 8/21/2024  1:48 PM *    Specimens:                Specimens       ID Source Type Tests Collected By Collected At Frozen?    A Large Intestine, Left / Descending Colon Polyp TISSUE PATHOLOGY EXAM   Colin Rojas MD 8/21/24 1341 No    Description: Descending Polyp x2 (forcep)    B Large Intestine, Rectum Polyp TISSUE PATHOLOGY EXAM   Colin Rojas MD 8/21/24 1346 No    Description: Rectal Polyp x3 (forcep)                  Drains:   [REMOVED] Suprapubic Catheter Non-latex 16 Fr. (Removed)       Findings: Colon to TI good prep  Sigmoid Diverticulosis  [id number]        Complications: none          Colin Rojas MD     Date: 8/21/2024  Time: 13:50 EDT

## 2024-08-21 NOTE — H&P
Patient Care Team:  Papo Anand DO as PCP - General (Family Medicine)    CHIEF COMPLAINT: Personal hx colon polyps    HISTORY OF PRESENT ILLNESS:  Last exam was 2019    Past Medical History:   Diagnosis Date    Arthritis     Basal cell carcinoma     neck and shoulder    Cataract     Colon polyp     Diabetes     Type 2, boarderline. well controlled    Elevated cholesterol     Hay fever     Hearing impaired person, bilateral     wears hearing aids    Prostate cancer 11/6/2023     Past Surgical History:   Procedure Laterality Date    CATARACT EXTRACTION Bilateral     CERVICAL FUSION  2021 6, 7, 8    COLONOSCOPY      COLONOSCOPY N/A 06/29/2016    Procedure: COLONOSCOPY; POLYPECTOMY;  Surgeon: Colin Rojas MD;  Location: [facility];  Service:     COLONOSCOPY N/A 08/16/2019    Procedure: COLONOSCOPY, polypectomy;  Surgeon: Colin Rojas MD;  Location: [facility];  Service: Gastroenterology    HIGH INTENSITY FOCUSED ULTRASOUND TREATMENT PROSTATE N/A 9/22/2023    Procedure: HIGH INTENSITY FOCUSED ULTRASOUND WHOLE GLAND WITH SUPRAPUBIC CATHETER;  Surgeon: Ced Sheehan Jr., MD;  Location: [facility];  Service: Urology;  Laterality: N/A;     Family History   Problem Relation Age of Onset    Cancer Mother         pancreatic    Cancer Father         polysythemia/skin cancer    Malig Hyperthermia Neg Hx     Breast cancer Neg Hx      Social History     Tobacco Use    Smoking status: Never    Smokeless tobacco: Never   Vaping Use    Vaping status: Never Used   Substance Use Topics    Alcohol use: Yes     Alcohol/week: 2.0 standard drinks of alcohol     Types: 2 Glasses of wine per week     Comment: I PER DAY    Drug use: No     Medications Prior to Admission   Medication Sig Dispense Refill Last Dose    diclofenac (VOLTAREN) 50 MG EC tablet Take 1 tablet by mouth 2 (Two) Times a Day.   Past Week    vitamin C (ASCORBIC ACID) 500 MG tablet Take 1 tablet by mouth Daily.   Past Week    atorvastatin  "(LIPITOR) 80 MG tablet Take 1 tablet by mouth Daily. 90 tablet 1 8/19/2024    CALCIUM ACETATE-MAGNESIUM CARB PO Take  by mouth.   8/19/2024    Cetirizine HCl 10 MG capsule Take  by mouth.   8/19/2024    Cholecalciferol (VITAMIN D3) 125 MCG (5000 UT) capsule capsule Take 2 capsules by mouth Daily.       Dupilumab 300 MG/2ML solution pen-injector Inject 2 mL under the skin into the appropriate area as directed. 2 injections twice per month       ezetimibe (ZETIA) 10 MG tablet Take 1 tablet by mouth Daily. 90 tablet 1 8/19/2024    fenofibrate 160 MG tablet Take 1 tablet by mouth Daily. 90 tablet 1 8/19/2024    glucose blood test strip Use as instructed to check blood sugar once daily 100 each 4     glucose monitor monitoring kit Use to check blood sugar once daily 1 each 0     Lancets Misc. kit Use stylus and lancet to check blood sugar once daily 100 each 4     metFORMIN (GLUCOPHAGE) 1000 MG tablet Take 1 tablet by mouth 2 (Two) Times a Day With Meals. 180 tablet 1 8/19/2024    Milk Thistle 1000 MG capsule Take  by mouth.   8/14/2024    Misc Natural Products (GLUCOSAMINE CHOND MSM FORMULA PO) Take  by mouth.   8/14/2024    [facility] Natural Products (PRO NUTRIENTS FRUIT & VEGGIE PO) Take 3 capsules by mouth.   8/14/2024    QUERCETIN PO Take 1,000 mg by mouth.   8/7/2024    Sodium Fluoride 5000 Sensitive 1.1-5 % paste         Allergies:  Shrimp and Shrimp flavor    REVIEW OF SYSTEMS:  Please see the above history of present illness for pertinent positives and negatives.  The remainder of the patient's systems have been reviewed and are negative.     Vital Signs  Temp:  [98.3 °F (36.8 °C)] 98.3 °F (36.8 °C)  Heart Rate:  [70] 70  Resp:  [24] 24  BP: (152-159)/() 159/99    Flowsheet Rows      Flowsheet Row First Filed Value   Admission Height 182.9 cm (72\") Documented at 08/21/2024 1153   Admission Weight 102 kg (224 lb 3.2 oz) Documented at 08/21/2024 1125             Physical Exam:  Physical Exam   Constitutional: " Patient appears well-developed and well-nourished and in no acute distress   HEENT:   Head: Normocephalic and atraumatic.   Eyes:  Pupils are equal, round, and reactive to light. EOM are intact. Sclerae are anicteric and non-injected.  Mouth and Throat: Patient has moist mucous membranes. Oropharynx is clear of any erythema or exudate.     Neck: Neck supple. No JVD present. No thyromegaly present. No lymphadenopathy present.  Cardiovascular: Regular rate, regular rhythm, S1 normal and S2 normal.  Exam reveals no gallop and no friction rub.  No murmur heard.  Pulmonary/Chest: Lungs are clear to auscultation bilaterally. No respiratory distress. No wheezes. No rhonchi. No rales.   Abdominal: Soft. Bowel sounds are normal. No distension and no mass. There is no hepatosplenomegaly. There is no tenderness.   Musculoskeletal: Normal Muscle tone  Extremities: No edema. Pulses are palpable in all 4 extremities.  Neurological: Patient is alert and oriented to person, place, and time. Cranial nerves II-XII are grossly intact with no focal deficits.  Skin: Skin is warm. No rash noted. Nails show no clubbing.  No cyanosis or erythema.    Debilities/Disabilities Identified: None  Emotional Behavior: Appropriate     Results Review:   I reviewed the patient's new clinical results.    Lab Results (most recent)       Procedure Component Value Units Date/Time    POC Glucose Once [[id number]]  (Normal) Collected: 08/21/24 1144    Specimen: Blood Updated: 08/21/24 1150     Glucose 111 mg/dL             Imaging Results (Most Recent)       None          reviewed    ECG/EMG Results (most recent)       None          reviewed    Assessment & Plan   Personal hx colon polyps/  colonoscopy      I discussed the patient's findings and my recommendations with patient.     Colin Rojas MD  08/21/24  12:44 EDT    Time: 10 min prior to procedure.

## 2024-08-21 NOTE — ANESTHESIA PREPROCEDURE EVALUATION
Anesthesia Evaluation     Patient summary reviewed and Nursing notes reviewed   no history of anesthetic complications:   NPO Solid Status: > 8 hours  NPO Liquid Status: > 8 hours           Airway   Mallampati: II  TM distance: >3 FB  Neck ROM: full  No difficulty expected  Dental      Comment: Perm bridge on bottom    Pulmonary - negative pulmonary ROS and normal exam    breath sounds clear to auscultationSleep apnea: snores.  Cardiovascular - normal exam  Exercise tolerance: good (4-7 METS)    ECG reviewed  Rhythm: regular  Rate: normal    (+) hyperlipidemia    ROS comment: EKG 9/14/23:  - BORDERLINE ECG -  Sinus rhythm  Abnormal lateral Q waves  When compared with ECG of 26-Apr-2021 8:02:38,  No significant change    STRESS TEST 4/30/21:  Interpretation Summary    ·Myocardial perfusion imaging indicates a normal myocardial perfusion study with no evidence of ischemia.  ·Left ventricular ejection fraction is normal. (Calculated EF = 70%).  ·Impressions are consistent with a low risk study.    Neuro/Psych- negative ROS  GI/Hepatic/Renal/Endo    (+) obesity, diabetes mellitus type 2, thyroid problem hypothyroidism    Musculoskeletal     (+) neck pain (ACDF 5-7)  Abdominal  - normal exam   Substance History Alcohol use: 2 drinks per day 5x/week.     OB/GYN          Other   arthritis,   history of cancer (prostate) remission                  Anesthesia Plan    ASA 2     MAC     intravenous induction     Anesthetic plan, risks, benefits, and alternatives have been provided, discussed and informed consent has been obtained with: patient.  Pre-procedure education provided  Use of blood products discussed with patient  Consented to blood products.    Plan discussed with CRNA.      CODE STATUS:

## 2024-08-21 NOTE — ANESTHESIA POSTPROCEDURE EVALUATION
Patient: Krishna Le    Procedure Summary       Date: 08/21/24 Room / Location: [facility] ENDOSCOPY 1 /  [facility] OR    Anesthesia Start: 1319 Anesthesia Stop: 1355    Procedure: COLONOSCOPY WITH POLYPECTOMY Diagnosis:       Personal history of colonic polyps      Diverticulosis      Colon polyp      (Personal history of colonic polyps [Z86.010])    Surgeons: Colin Rojas MD Provider: Bella Tyson CRNA    Anesthesia Type: MAC ASA Status: 2            Anesthesia Type: MAC    Vitals  Vitals Value Taken Time   /104 08/21/24 1415   Temp 97.8 °F (36.6 °C) 08/21/24 1359   Pulse 66 08/21/24 1418   Resp 12 08/21/24 1400   SpO2 94 % 08/21/24 1406   Vitals shown include unfiled device data.        Post Anesthesia Care and Evaluation    Patient location during evaluation: bedside  Patient participation: complete - patient participated  Level of consciousness: awake and alert  Pain score: 0  Pain management: adequate    Airway patency: patent  Anesthetic complications: No anesthetic complications  PONV Status: none  Cardiovascular status: acceptable  Respiratory status: acceptable  Hydration status: acceptable

## 2024-12-10 DIAGNOSIS — E78.2 MIXED HYPERLIPIDEMIA: ICD-10-CM

## 2024-12-10 DIAGNOSIS — E66.09 EXOGENOUS OBESITY: ICD-10-CM

## 2024-12-10 DIAGNOSIS — E55.9 VITAMIN D DEFICIENCY: ICD-10-CM

## 2024-12-10 DIAGNOSIS — Z85.46 HISTORY OF PROSTATE CANCER: ICD-10-CM

## 2024-12-10 DIAGNOSIS — R06.02 SHORTNESS OF BREATH: ICD-10-CM

## 2024-12-10 DIAGNOSIS — E11.9 TYPE 2 DIABETES MELLITUS WITHOUT COMPLICATION, WITHOUT LONG-TERM CURRENT USE OF INSULIN: ICD-10-CM

## 2024-12-13 LAB
25(OH)D3+25(OH)D2 SERPL-MCNC: 74.9 NG/ML (ref 30–100)
ALBUMIN SERPL-MCNC: 4.1 G/DL (ref 3.5–5.2)
ALBUMIN/GLOB SERPL: 1 G/DL
ALP SERPL-CCNC: 34 U/L (ref 39–117)
ALT SERPL-CCNC: 45 U/L (ref 1–41)
AST SERPL-CCNC: 39 U/L (ref 1–40)
BASOPHILS # BLD AUTO: 0.07 10*3/MM3 (ref 0–0.2)
BASOPHILS NFR BLD AUTO: 1.1 % (ref 0–1.5)
BILIRUB SERPL-MCNC: 0.5 MG/DL (ref 0–1.2)
BUN SERPL-MCNC: 17 MG/DL (ref 8–23)
BUN/CREAT SERPL: 15.6 (ref 7–25)
CALCIUM SERPL-MCNC: 10 MG/DL (ref 8.6–10.5)
CHLORIDE SERPL-SCNC: 104 MMOL/L (ref 98–107)
CHOLEST SERPL-MCNC: 139 MG/DL (ref 0–200)
CO2 SERPL-SCNC: 24.3 MMOL/L (ref 22–29)
CREAT SERPL-MCNC: 1.09 MG/DL (ref 0.76–1.27)
EGFRCR SERPLBLD CKD-EPI 2021: 70.8 ML/MIN/1.73
EOSINOPHIL # BLD AUTO: 0.2 10*3/MM3 (ref 0–0.4)
EOSINOPHIL NFR BLD AUTO: 3.3 % (ref 0.3–6.2)
ERYTHROCYTE [DISTWIDTH] IN BLOOD BY AUTOMATED COUNT: 12.3 % (ref 12.3–15.4)
GLOBULIN SER CALC-MCNC: 4.1 GM/DL
GLUCOSE SERPL-MCNC: 110 MG/DL (ref 65–99)
HBA1C MFR BLD: 6.4 % (ref 4.8–5.6)
HCT VFR BLD AUTO: 44.9 % (ref 37.5–51)
HDLC SERPL-MCNC: 47 MG/DL (ref 40–60)
HGB BLD-MCNC: 15.2 G/DL (ref 13–17.7)
IMM GRANULOCYTES # BLD AUTO: 0.02 10*3/MM3 (ref 0–0.05)
IMM GRANULOCYTES NFR BLD AUTO: 0.3 % (ref 0–0.5)
LDLC SERPL CALC-MCNC: 79 MG/DL (ref 0–100)
LYMPHOCYTES # BLD AUTO: 1.35 10*3/MM3 (ref 0.7–3.1)
LYMPHOCYTES NFR BLD AUTO: 22 % (ref 19.6–45.3)
MCH RBC QN AUTO: 30.8 PG (ref 26.6–33)
MCHC RBC AUTO-ENTMCNC: 33.9 G/DL (ref 31.5–35.7)
MCV RBC AUTO: 91.1 FL (ref 79–97)
MONOCYTES # BLD AUTO: 0.53 10*3/MM3 (ref 0.1–0.9)
MONOCYTES NFR BLD AUTO: 8.6 % (ref 5–12)
NEUTROPHILS # BLD AUTO: 3.98 10*3/MM3 (ref 1.7–7)
NEUTROPHILS NFR BLD AUTO: 64.7 % (ref 42.7–76)
NRBC BLD AUTO-RTO: 0 /100 WBC (ref 0–0.2)
PLATELET # BLD AUTO: 284 10*3/MM3 (ref 140–450)
POTASSIUM SERPL-SCNC: 4.5 MMOL/L (ref 3.5–5.2)
PROT SERPL-MCNC: 8.2 G/DL (ref 6–8.5)
RBC # BLD AUTO: 4.93 10*6/MM3 (ref 4.14–5.8)
SODIUM SERPL-SCNC: 139 MMOL/L (ref 136–145)
TRIGL SERPL-MCNC: 62 MG/DL (ref 0–150)
TSH SERPL DL<=0.005 MIU/L-ACNC: 3.27 UIU/ML (ref 0.27–4.2)
VLDLC SERPL CALC-MCNC: 13 MG/DL (ref 5–40)
WBC # BLD AUTO: 6.15 10*3/MM3 (ref 3.4–10.8)

## 2024-12-18 DIAGNOSIS — E78.2 MIXED HYPERLIPIDEMIA: ICD-10-CM

## 2024-12-18 RX ORDER — ATORVASTATIN CALCIUM 80 MG/1
80 TABLET, FILM COATED ORAL DAILY
Qty: 90 TABLET | Refills: 1 | Status: SHIPPED | OUTPATIENT
Start: 2024-12-18

## 2024-12-18 RX ORDER — FENOFIBRATE 160 MG/1
160 TABLET ORAL DAILY
Qty: 90 TABLET | Refills: 1 | Status: SHIPPED | OUTPATIENT
Start: 2024-12-18

## 2024-12-19 ENCOUNTER — OFFICE VISIT (OUTPATIENT)
Dept: FAMILY MEDICINE CLINIC | Facility: CLINIC | Age: 74
End: 2024-12-19
Payer: MEDICARE

## 2024-12-19 VITALS
SYSTOLIC BLOOD PRESSURE: 126 MMHG | TEMPERATURE: 97.1 F | OXYGEN SATURATION: 96 % | HEIGHT: 72 IN | HEART RATE: 75 BPM | BODY MASS INDEX: 30.85 KG/M2 | WEIGHT: 227.8 LBS | DIASTOLIC BLOOD PRESSURE: 80 MMHG

## 2024-12-19 DIAGNOSIS — E11.9 TYPE 2 DIABETES MELLITUS WITHOUT COMPLICATION, WITHOUT LONG-TERM CURRENT USE OF INSULIN: ICD-10-CM

## 2024-12-19 DIAGNOSIS — E03.8 SUBCLINICAL HYPOTHYROIDISM: ICD-10-CM

## 2024-12-19 DIAGNOSIS — Z85.46 HISTORY OF PROSTATE CANCER: ICD-10-CM

## 2024-12-19 DIAGNOSIS — E78.2 MIXED HYPERLIPIDEMIA: ICD-10-CM

## 2024-12-19 DIAGNOSIS — E66.09 EXOGENOUS OBESITY: ICD-10-CM

## 2024-12-19 DIAGNOSIS — J06.9 VIRAL UPPER RESPIRATORY INFECTION: ICD-10-CM

## 2024-12-19 DIAGNOSIS — E55.9 VITAMIN D DEFICIENCY: ICD-10-CM

## 2024-12-19 DIAGNOSIS — Z00.00 MEDICARE ANNUAL WELLNESS VISIT, SUBSEQUENT: Primary | ICD-10-CM

## 2024-12-19 PROCEDURE — 1125F AMNT PAIN NOTED PAIN PRSNT: CPT | Performed by: FAMILY MEDICINE

## 2024-12-19 PROCEDURE — 1160F RVW MEDS BY RX/DR IN RCRD: CPT | Performed by: FAMILY MEDICINE

## 2024-12-19 PROCEDURE — 1159F MED LIST DOCD IN RCRD: CPT | Performed by: FAMILY MEDICINE

## 2024-12-19 PROCEDURE — 1170F FXNL STATUS ASSESSED: CPT | Performed by: FAMILY MEDICINE

## 2024-12-19 PROCEDURE — G0439 PPPS, SUBSEQ VISIT: HCPCS | Performed by: FAMILY MEDICINE

## 2024-12-19 PROCEDURE — 3044F HG A1C LEVEL LT 7.0%: CPT | Performed by: FAMILY MEDICINE

## 2024-12-19 PROCEDURE — 99213 OFFICE O/P EST LOW 20 MIN: CPT | Performed by: FAMILY MEDICINE

## 2024-12-19 RX ORDER — EZETIMIBE 10 MG/1
10 TABLET ORAL DAILY
Qty: 90 TABLET | Refills: 1 | Status: SHIPPED | OUTPATIENT
Start: 2024-12-19

## 2024-12-19 NOTE — ASSESSMENT & PLAN NOTE
Patient's (Body mass index is 30.89 kg/m².) indicates that they are obese (BMI >30) with health conditions that include obstructive sleep apnea, hypertension, diabetes mellitus, dyslipidemias, GERD, and osteoarthritis . Weight is unchanged. BMI  is above average; BMI management plan is completed. We discussed portion control and increasing exercise.     Orders:    Comprehensive metabolic panel; Future    Vitamin D 25 hydroxy; Future    TSH; Future    Lipid panel; Future    Hemoglobin A1c; Future    CBC w AUTO Differential; Future

## 2024-12-19 NOTE — ASSESSMENT & PLAN NOTE
Diabetes is stable.   Continue current treatment regimen.  Diabetes will be reassessed in 6 months    Orders:    metFORMIN (GLUCOPHAGE) 1000 MG tablet; Take 1 tablet by mouth 2 (Two) Times a Day With Meals.    Comprehensive metabolic panel; Future    Vitamin D 25 hydroxy; Future    TSH; Future    Lipid panel; Future    Hemoglobin A1c; Future    CBC w AUTO Differential; Future

## 2024-12-19 NOTE — ASSESSMENT & PLAN NOTE
Orders:    Comprehensive metabolic panel; Future    Vitamin D 25 hydroxy; Future    TSH; Future    Lipid panel; Future    Hemoglobin A1c; Future    CBC w AUTO Differential; Future

## 2024-12-19 NOTE — PROGRESS NOTES
Subjective   The ABCs of the Annual Wellness Visit  Medicare Wellness Visit      Krishna Le is a 74 y.o. patient who presents for a Medicare Wellness Visit.    The following portions of the patient's history were reviewed and   updated as appropriate: allergies, current medications, past family history, past medical history, past social history, past surgical history, and problem list.    Compared to one year ago, the patient's physical   health is the same.  Compared to one year ago, the patient's mental   health is the same.    Recent Hospitalizations:  He was not admitted to the hospital during the last year.     Current Medical Providers:  Patient Care Team:  Papo Anand DO as PCP - General (Family Medicine)    Outpatient Medications Prior to Visit   Medication Sig Dispense Refill    atorvastatin (LIPITOR) 80 MG tablet TAKE 1 TABLET BY MOUTH DAILY 90 tablet 1    CALCIUM ACETATE-MAGNESIUM CARB PO Take  by mouth.      Cetirizine HCl 10 MG capsule Take  by mouth.      Cholecalciferol (VITAMIN D3) 125 MCG (5000 UT) capsule capsule Take 2 capsules by mouth Daily.      diclofenac (VOLTAREN) 50 MG EC tablet Take 1 tablet by mouth 2 (Two) Times a Day.      Dupilumab 300 MG/2ML solution pen-injector Inject 2 mL under the skin into the appropriate area as directed. 2 injections twice per month      fenofibrate 160 MG tablet TAKE 1 TABLET BY MOUTH DAILY 90 tablet 1    glucose blood test strip Use as instructed to check blood sugar once daily 100 each 4    glucose monitor monitoring kit Use to check blood sugar once daily 1 each 0    Lancets Misc. kit Use stylus and lancet to check blood sugar once daily 100 each 4    Milk Thistle 1000 MG capsule Take  by mouth.      Misc Natural Products (GLUCOSAMINE CHOND MSM FORMULA PO) Take  by mouth.      QUERCETIN PO Take 1,000 mg by mouth.      Sodium Fluoride 5000 Sensitive 1.1-5 % paste       vitamin C (ASCORBIC ACID) 500 MG tablet Take 1 tablet by mouth Daily.       "ezetimibe (ZETIA) 10 MG tablet Take 1 tablet by mouth Daily. 90 tablet 1    metFORMIN (GLUCOPHAGE) 1000 MG tablet Take 1 tablet by mouth 2 (Two) Times a Day With Meals. 180 tablet 1    Misc Natural Products (PRO NUTRIENTS FRUIT & VEGGIE PO) Take 3 capsules by mouth. (Patient not taking: Reported on 12/19/2024)       No facility-administered medications prior to visit.     No opioid medication identified on active medication list. I have reviewed chart for other potential  high risk medication/s and harmful drug interactions in the elderly.      Aspirin is not on active medication list.  Aspirin use is not indicated based on review of current medical condition/s. Risk of harm outweighs potential benefits.  .    Patient Active Problem List   Diagnosis    Mixed hyperlipidemia    Erectile dysfunction    Vitamin D deficiency    Exogenous obesity    Adenomatous polyp of colon    Type 2 diabetes mellitus without complication, without long-term current use of insulin    Acquired hypothyroidism    Subclinical hypothyroidism    History of prostate cancer    Personal history of colonic polyps     Advance Care Planning Advance Directive is on file.  ACP discussion was held with the patient during this visit. Patient has an advance directive in EMR which is still valid.             Objective   Vitals:    12/19/24 1014   BP: 126/80   BP Location: Left arm   Patient Position: Sitting   Cuff Size: Large Adult   Pulse: 75   Temp: 97.1 °F (36.2 °C)   TempSrc: Infrared   SpO2: 96%   Weight: 103 kg (227 lb 12.8 oz)   Height: 182.9 cm (72.01\")   PainSc:   2   PainLoc: Neck       Estimated body mass index is 30.89 kg/m² as calculated from the following:    Height as of this encounter: 182.9 cm (72.01\").    Weight as of this encounter: 103 kg (227 lb 12.8 oz).                Does the patient have evidence of cognitive impairment? No  Lab Results   Component Value Date    CHLPL 139 12/12/2024    TRIG 62 12/12/2024    HDL 47 12/12/2024    " LDL 79 2024    VLDL 13 2024    HGBA1C 6.40 (H) 2024                                                                                                Health  Risk Assessment    Smoking Status:  Social History     Tobacco Use   Smoking Status Never   Smokeless Tobacco Never     Alcohol Consumption:  Social History     Substance and Sexual Activity   Alcohol Use Yes    Alcohol/week: 2.0 standard drinks of alcohol    Types: 2 Glasses of wine per week    Comment: I PER DAY       Fall Risk Screen  CHARLA Fall Risk Assessment was completed, and patient is at LOW risk for falls.Assessment completed on:2024    Depression Screening   Little interest or pleasure in doing things? Not at all   Feeling down, depressed, or hopeless? Not at all   PHQ-2 Total Score 0      Health Habits and Functional and Cognitive Screenin/19/2024    10:20 AM   Functional & Cognitive Status   Do you have difficulty preparing food and eating? No   Do you have difficulty bathing yourself, getting dressed or grooming yourself? No   Do you have difficulty using the toilet? No   Do you have difficulty moving around from place to place? No   Do you have trouble with steps or getting out of a bed or a chair? No   Current Diet Well Balanced Diet   Dental Exam Up to date   Eye Exam Up to date   Exercise (times per week) 3 times per week   Current Exercises Include Walking   Do you need help using the phone?  No   Are you deaf or do you have serious difficulty hearing?  No   Do you need help to go to places out of walking distance? No   Do you need help shopping? No   Do you need help preparing meals?  No   Do you need help with housework?  No   Do you need help with laundry? No   Do you need help taking your medications? No   Do you need help managing money? No   Do you ever drive or ride in a car without wearing a seat belt? No   Have you felt unusual stress, anger or loneliness in the last month? No   Who do you live with?  Spouse   If you need help, do you have trouble finding someone available to you? No   Have you been bothered in the last four weeks by sexual problems? No   Do you have difficulty concentrating, remembering or making decisions? No           Age-appropriate Screening Schedule:  Refer to the list below for future screening recommendations based on patient's age, sex and/or medical conditions. Orders for these recommended tests are listed in the plan section. The patient has been provided with a written plan.    Health Maintenance List  Health Maintenance   Topic Date Due    Annual Breast MRI  Never done    COVID-19 Vaccine (4 - 2024-25 season) 12/21/2024 (Originally 9/1/2024)    DIABETIC FOOT EXAM  12/31/2024 (Originally 10/23/2019)    RSV Vaccine - Adults (1 - 1-dose 75+ series) 12/19/2025 (Originally 7/12/2024)    DIABETIC EYE EXAM  02/27/2025    MAMMOGRAM BRCA POSITIVE  04/10/2025    URINE MICROALBUMIN  06/11/2025    HEMOGLOBIN A1C  06/12/2025    LIPID PANEL  12/12/2025    ANNUAL WELLNESS VISIT  12/19/2025    BMI FOLLOWUP  12/19/2025    COLORECTAL CANCER SCREENING  08/21/2029    TDAP/TD VACCINES (3 - Td or Tdap) 11/08/2029    HEPATITIS C SCREENING  Completed    INFLUENZA VACCINE  Completed    Pneumococcal Vaccine 65+  Completed    ZOSTER VACCINE  Discontinued                                                                                                                                                CMS Preventative Services Quick Reference  Risk Factors Identified During Encounter  Immunizations Discussed/Encouraged: Influenza  Dental Screening Recommended  Vision Screening Recommended    The above risks/problems have been discussed with the patient.  Pertinent information has been shared with the patient in the After Visit Summary.  An After Visit Summary and PPPS were made available to the patient.    Follow Up:   Next Medicare Wellness visit to be scheduled in 1 year.         Additional E&M Note during same  "encounter follows:  Patient has additional, significant, and separately identifiable condition(s)/problem(s) that require work above and beyond the Medicare Wellness Visit     Chief Complaint  Medicare Wellness-subsequent (Labs prior)    Subjective   HPI  Krishna is also being seen today for an annual adult preventative physical exam.  and Krishna is also being seen today for additional medical problem/s.  74-year-old white male here for AWV-Medicare as well as in follow-up for several chronic issues.  Patient with known history of type II non-insulin-dependent diabetes mellitus, hyperlipidemia, vitamin D deficiency as well as exogenous obesity.  Patient has been diagnosed with prostate cancer and is followed by urology.  There is history of erectile dysfunction.  Medications include atorvastatin, Zetia as well as fenofibrate and metformin.  Patient does use a host of supplements and vitamins.  All medications and supplements are used appropriately and are well-tolerated without side effects.  Fasting labs have been acquired prior to this visit.  Of acute nature is patient has had a cough now for several weeks.  He believes it may be hayfever his wife is concerned that it may be something infectious.  He is asking for further evaluation of same.    Review of Systems   Respiratory:  Positive for cough.    Cardiovascular:         Hyperlipidemia   Endocrine:        Exogenous obesity, vitamin D deficiency, type II non-insulin-dependent diabetes mellitus, hypothyroidism   Genitourinary:         Prostate cancer   All other systems reviewed and are negative.             Objective   Vital Signs:  /80 (BP Location: Left arm, Patient Position: Sitting, Cuff Size: Large Adult)   Pulse 75   Temp 97.1 °F (36.2 °C) (Infrared)   Ht 182.9 cm (72.01\")   Wt 103 kg (227 lb 12.8 oz)   SpO2 96%   BMI 30.89 kg/m²   Physical Exam  Vitals and nursing note reviewed.   Constitutional:       Appearance: Normal appearance. He is " well-developed and well-groomed. He is obese.      Comments: Exogenous obesity with a BMI of 30.9   HENT:      Head: Normocephalic and atraumatic.      Right Ear: Hearing, tympanic membrane, ear canal and external ear normal.      Left Ear: Hearing, tympanic membrane, ear canal and external ear normal.      Nose: Nose normal.      Mouth/Throat:      Lips: Pink.      Mouth: Mucous membranes are moist.      Pharynx: Oropharynx is clear. Uvula midline.   Neck:      Thyroid: No thyroid mass or thyromegaly.      Vascular: Normal carotid pulses. No carotid bruit.      Trachea: Trachea and phonation normal.   Cardiovascular:      Rate and Rhythm: Normal rate and regular rhythm.      Heart sounds: Normal heart sounds. No murmur heard.     No friction rub. No gallop.   Pulmonary:      Effort: Pulmonary effort is normal. No respiratory distress.      Breath sounds: Normal breath sounds. No decreased breath sounds, wheezing, rhonchi or rales.   Musculoskeletal:      Cervical back: Neck supple.   Lymphadenopathy:      Cervical: No cervical adenopathy.   Skin:     General: Skin is warm and dry.      Findings: No rash.   Neurological:      Mental Status: He is alert and oriented to person, place, and time.   Psychiatric:         Attention and Perception: Attention and perception normal.         Mood and Affect: Mood and affect normal.         Speech: Speech normal.         Behavior: Behavior normal. Behavior is cooperative.         Thought Content: Thought content normal.         Cognition and Memory: Cognition and memory normal.         Judgment: Judgment normal.       Orders Only on 12/10/2024   Component Date Value Ref Range Status    Glucose 12/12/2024 110 (H)  65 - 99 mg/dL Final    BUN 12/12/2024 17  8 - 23 mg/dL Final    Creatinine 12/12/2024 1.09  0.76 - 1.27 mg/dL Final    EGFR Result 12/12/2024 70.8  >60.0 mL/min/1.73 Final    Comment: GFR Categories in Chronic Kidney Disease (CKD)/X09/  /X09/  GFR Category          GFR  (mL/min/1.73)    Interpretation  G1/X09/                    90 or greater/X09/        Normal or high  (1)  G2//                    60-89                Mild decrease  (1)  G3a                   45-59                Mild to moderate  decrease  G3b                   30-44                Moderate to  severe decrease  G4                    15-29                Severe decrease  G5                    14 or less           Kidney failure//  /[id number]/  (1)In the absence of evidence of kidney disease, neither  GFR category G1 or G2 fulfill the criteria for CKD.  eGFR calculation 2021 CKD-EPI creatinine equation, which  does not include race as a factor      BUN/Creatinine Ratio 12/12/2024 15.6  7.0 - 25.0 Final    Sodium 12/12/2024 139  136 - 145 mmol/L Final    Potassium 12/12/2024 4.5  3.5 - 5.2 mmol/L Final    Chloride 12/12/2024 104  98 - 107 mmol/L Final    Total CO2 12/12/2024 24.3  22.0 - 29.0 mmol/L Final    Calcium 12/12/2024 10.0  8.6 - 10.5 mg/dL Final    Total Protein 12/12/2024 8.2  6.0 - 8.5 g/dL Final    Albumin 12/12/2024 4.1  3.5 - 5.2 g/dL Final    Globulin 12/12/2024 4.1  gm/dL Final    A/G Ratio 12/12/2024 1.0  g/dL Final    Total Bilirubin 12/12/2024 0.5  0.0 - 1.2 mg/dL Final    Alkaline Phosphatase 12/12/2024 34 (L)  39 - 117 U/L Final    AST (SGOT) 12/12/2024 39  1 - 40 U/L Final    ALT (SGPT) 12/12/2024 45 (H)  1 - 41 U/L Final    25 Hydroxy, Vitamin D 12/12/2024 74.9  30.0 - 100.0 ng/ml Final    Comment: Reference Range for Total Vitamin D 25(OH)  Deficiency <20.0 ng/mL  Insufficiency 21-29 ng/mL  Sufficiency  ng/mL  Toxicity >100 ng/ml      TSH 12/12/2024 3.270  0.270 - 4.200 uIU/mL Final    Total Cholesterol 12/12/2024 139  0 - 200 mg/dL Final    Comment: Cholesterol Reference Ranges  (U.S. Department of Health and Human Services ATP III  Classifications)  Desirable          <200 mg/dL  Borderline High    200-239 mg/dL  High Risk          >240 mg/dL  Triglyceride Reference  Ranges  (U.S. Department of Health and Human Services ATP III  Classifications)  Normal           <150 mg/dL  Borderline High  150-199 mg/dL  High             200-499 mg/dL  Very High        >500 mg/dL  HDL Reference Ranges  (U.S. Department of Health and Human Services ATP III  Classifications)  Low     <40 mg/dl (major risk factor for CHD)  High    >60 mg/dl ('negative' risk factor for CHD)  LDL Reference Ranges  (U.S. Department of Health and Human Services ATP III  Classifications)  Optimal          <100 mg/dL  Near Optimal     100-129 mg/dL  Borderline High  130-159 mg/dL  High             160-189 mg/dL  Very High        >189 mg/dL      Triglycerides 12/12/2024 62  0 - 150 mg/dL Final    HDL Cholesterol 12/12/2024 47  40 - 60 mg/dL Final    VLDL Cholesterol [place] 12/12/2024 13  5 - 40 mg/dL Final    LDL Chol Calc ([facility]) 12/12/2024 79  0 - 100 mg/dL Final    Hemoglobin A1C 12/12/2024 6.40 (H)  4.80 - 5.60 % Final    Comment: Hemoglobin A1C Ranges:  Increased Risk for Diabetes  5.7% to 6.4%  Diabetes                     >= 6.5%  Diabetic Goal                < 7.0%      WBC 12/12/2024 6.15  3.40 - 10.80 10*3/mm3 Final    RBC 12/12/2024 4.93  4.14 - 5.80 10*6/mm3 Final    Hemoglobin 12/12/2024 15.2  13.0 - 17.7 g/dL Final    Hematocrit 12/12/2024 44.9  37.5 - 51.0 % Final    MCV 12/12/2024 91.1  79.0 - 97.0 fL Final    MCH 12/12/2024 30.8  26.6 - 33.0 pg Final    MCHC 12/12/2024 33.9  31.5 - 35.7 g/dL Final    RDW 12/12/2024 12.3  12.3 - 15.4 % Final    Platelets 12/12/2024 284  140 - 450 10*3/mm3 Final    Neutrophil Rel % 12/12/2024 64.7  42.7 - 76.0 % Final    Lymphocyte Rel % 12/12/2024 22.0  19.6 - 45.3 % Final    Monocyte Rel % 12/12/2024 8.6  5.0 - 12.0 % Final    Eosinophil Rel % 12/12/2024 3.3  0.3 - 6.2 % Final    Basophil Rel % 12/12/2024 1.1  0.0 - 1.5 % Final    Neutrophils Absolute 12/12/2024 3.98  1.70 - 7.00 10*3/mm3 Final    Lymphocytes Absolute 12/12/2024 1.35  0.70 - 3.10 10*3/mm3 Final    Monocytes  Absolute 12/12/2024 0.53  0.10 - 0.90 10*3/mm3 Final    Eosinophils Absolute 12/12/2024 0.20  0.00 - 0.40 10*3/mm3 Final    Basophils Absolute 12/12/2024 0.07  0.00 - 0.20 10*3/mm3 Final    Immature Granulocyte Rel % 12/12/2024 0.3  0.0 - 0.5 % Final    Immature Grans Absolute 12/12/2024 0.02  0.00 - 0.05 10*3/mm3 Final    nRBC 12/12/2024 0.0  0.0 - 0.2 /100 WBC Final         The following data was reviewed by: Papo Anand DO on 12/19/2024:  Data reviewed : GI studies colonoscopy  Common labs          2/13/2024    11:47 6/11/2024    09:40 12/12/2024    09:47   Common Labs   Glucose  113  110    BUN  22  17    Creatinine  1.17  1.09    EGFR  Am 70         Sodium  138  139    Potassium  4.8  4.5    Chloride  103  104    Calcium  9.9  10.0    Total Protein  7.6  8.2    Albumin  4.3  4.1    Total Bilirubin  0.5  0.5    Alkaline Phosphatase  30  34    AST (SGOT)  35  39    ALT (SGPT)  44  45    WBC  5.55  6.15    Hemoglobin  14.3  15.2    Hematocrit  44.3  44.9    Platelets  285  284    Total Cholesterol  136  139    Triglycerides  69  62    HDL Cholesterol  49  47    LDL Cholesterol   73  79    Hemoglobin A1C  6.50  6.40    Microalbumin, Urine  60.9        Details          This result is from an external source.                     Assessment and Plan Additional age appropriate preventative wellness advice topics were discussed during today's preventative wellness exam(some topics already addressed during AWV portion of the note above):    Physical Activity: Advised cardiovascular activity 150 minutes per week as tolerated. (example brisk walk for 30 minutes, 5 days a week).     Nutrition: Discussed nutrition plan with patient. Information shared in after visit summary. Goal is for a well balanced diet to enhance overall health.     Healthy Weight: Discussed current and goal BMI with patient. Steps to attain this goal discussed. Information shared in after visit summary.     Motor Vehicle Safety Discussion:   Wearing Seatbelt While in Motor Vehicle recommendation. Adhering to posted speed limit recommendation.     Injury Prevention Discussion:  Information shared in after visit summary.                 Medicare annual wellness visit, subsequent    Orders:    Comprehensive metabolic panel; Future    Vitamin D 25 hydroxy; Future    TSH; Future    Lipid panel; Future    Hemoglobin A1c; Future    CBC w AUTO Differential; Future    Mixed hyperlipidemia   Lipid abnormalities are stable    Plan:  Continue same medication/s without change.      Discussed medication dosage, use, side effects, and goals of treatment in detail.    Counseled patient on lifestyle modifications to help control hyperlipidemia.     Patient Treatment Goals:   LDL goal is less than 70    Followup in 6 months.    Orders:    ezetimibe (ZETIA) 10 MG tablet; Take 1 tablet by mouth Daily.    Comprehensive metabolic panel; Future    Vitamin D 25 hydroxy; Future    TSH; Future    Lipid panel; Future    Hemoglobin A1c; Future    CBC w AUTO Differential; Future    Type 2 diabetes mellitus without complication, without long-term current use of insulin  Diabetes is stable.   Continue current treatment regimen.  Diabetes will be reassessed in 6 months    Orders:    metFORMIN (GLUCOPHAGE) 1000 MG tablet; Take 1 tablet by mouth 2 (Two) Times a Day With Meals.    Comprehensive metabolic panel; Future    Vitamin D 25 hydroxy; Future    TSH; Future    Lipid panel; Future    Hemoglobin A1c; Future    CBC w AUTO Differential; Future    Vitamin D deficiency    Orders:    Comprehensive metabolic panel; Future    Vitamin D 25 hydroxy; Future    TSH; Future    Lipid panel; Future    Hemoglobin A1c; Future    CBC w AUTO Differential; Future    Subclinical hypothyroidism    Orders:    Comprehensive metabolic panel; Future    Vitamin D 25 hydroxy; Future    TSH; Future    Lipid panel; Future    Hemoglobin A1c; Future    CBC w AUTO Differential; Future    Exogenous  obesity  Patient's (Body mass index is 30.89 kg/m².) indicates that they are obese (BMI >30) with health conditions that include obstructive sleep apnea, hypertension, diabetes mellitus, dyslipidemias, GERD, and osteoarthritis . Weight is unchanged. BMI  is above average; BMI management plan is completed. We discussed portion control and increasing exercise.     Orders:    Comprehensive metabolic panel; Future    Vitamin D 25 hydroxy; Future    TSH; Future    Lipid panel; Future    Hemoglobin A1c; Future    CBC w AUTO Differential; Future    History of prostate cancer    Orders:    Comprehensive metabolic panel; Future    Vitamin D 25 hydroxy; Future    TSH; Future    Lipid panel; Future    Hemoglobin A1c; Future    CBC w AUTO Differential; Future    Viral upper respiratory infection    Orders:    Comprehensive metabolic panel; Future    Vitamin D 25 hydroxy; Future    TSH; Future    Lipid panel; Future    Hemoglobin A1c; Future    CBC w AUTO Differential; Future          I spent 30 minutes caring for Krishna on this date of service. This time includes time spent by me in the following activities:preparing for the visit, reviewing tests, obtaining and/or reviewing a separately obtained history, performing a medically appropriate examination and/or evaluation , counseling and educating the patient/family/caregiver, ordering medications, tests, or procedures, referring and communicating with other health care professionals , documenting information in the medical record, independently interpreting results and communicating that information with the patient/family/caregiver, and care coordination  Follow Up   Return in about 6 months (around 6/19/2025) for Recheck.  Patient was given instructions and counseling regarding his condition or for health maintenance advice. Please see specific information pulled into the AVS if appropriate.

## 2025-02-03 ENCOUNTER — TELEPHONE (OUTPATIENT)
Dept: MAMMOGRAPHY | Facility: CLINIC | Age: 75
End: 2025-02-03
Payer: MEDICARE

## 2025-05-05 NOTE — ASSESSMENT & PLAN NOTE
Lipid abnormalities are stable    Plan:  Continue same medication/s without change.      Discussed medication dosage, use, side effects, and goals of treatment in detail.    Counseled patient on lifestyle modifications to help control hyperlipidemia.     Patient Treatment Goals:   LDL goal is less than 70    Followup in 6 months.    Orders:    ezetimibe (ZETIA) 10 MG tablet; Take 1 tablet by mouth Daily.    Comprehensive metabolic panel; Future    Vitamin D 25 hydroxy; Future    TSH; Future    Lipid panel; Future    Hemoglobin A1c; Future    CBC w AUTO Differential; Future     05-May-2025

## 2025-06-02 ENCOUNTER — TELEPHONE (OUTPATIENT)
Dept: FAMILY MEDICINE CLINIC | Facility: CLINIC | Age: 75
End: 2025-06-02
Payer: MEDICARE

## 2025-06-02 NOTE — TELEPHONE ENCOUNTER
HUB TO RELAY:   Tried to call patient regarding their appointment with Dr. Anand on 06/26/25.   We are needing to reschedule due to the provider being out of the office.      IF THE PATIENT HAS LABS:  *As long as patient reschedules within a 2-3 week period of their labs, the lab appointment may stay the same.      Patient did not answer, LVM.

## 2025-06-14 DIAGNOSIS — E78.2 MIXED HYPERLIPIDEMIA: ICD-10-CM

## 2025-06-16 RX ORDER — FENOFIBRATE 160 MG/1
160 TABLET ORAL DAILY
Qty: 90 TABLET | Refills: 1 | Status: SHIPPED | OUTPATIENT
Start: 2025-06-16

## 2025-06-16 RX ORDER — ATORVASTATIN CALCIUM 80 MG/1
80 TABLET, FILM COATED ORAL DAILY
Qty: 90 TABLET | Refills: 1 | Status: SHIPPED | OUTPATIENT
Start: 2025-06-16

## 2025-06-18 DIAGNOSIS — E11.9 TYPE 2 DIABETES MELLITUS WITHOUT COMPLICATION, WITHOUT LONG-TERM CURRENT USE OF INSULIN: ICD-10-CM

## 2025-06-18 DIAGNOSIS — E03.8 SUBCLINICAL HYPOTHYROIDISM: ICD-10-CM

## 2025-06-18 DIAGNOSIS — J06.9 VIRAL UPPER RESPIRATORY INFECTION: ICD-10-CM

## 2025-06-18 DIAGNOSIS — E66.09 EXOGENOUS OBESITY: ICD-10-CM

## 2025-06-18 DIAGNOSIS — E55.9 VITAMIN D DEFICIENCY: ICD-10-CM

## 2025-06-18 DIAGNOSIS — Z85.46 HISTORY OF PROSTATE CANCER: ICD-10-CM

## 2025-06-18 DIAGNOSIS — Z00.00 MEDICARE ANNUAL WELLNESS VISIT, SUBSEQUENT: ICD-10-CM

## 2025-06-18 DIAGNOSIS — E78.2 MIXED HYPERLIPIDEMIA: ICD-10-CM

## 2025-06-21 LAB
25(OH)D3+25(OH)D2 SERPL-MCNC: 83.3 NG/ML (ref 30–100)
ALBUMIN SERPL-MCNC: 4.2 G/DL (ref 3.5–5.2)
ALBUMIN/GLOB SERPL: 1.3 G/DL
ALP SERPL-CCNC: 31 U/L (ref 39–117)
ALT SERPL-CCNC: 37 U/L (ref 1–41)
AST SERPL-CCNC: 31 U/L (ref 1–40)
BASOPHILS # BLD AUTO: 0.07 10*3/MM3 (ref 0–0.2)
BASOPHILS NFR BLD AUTO: 1.2 % (ref 0–1.5)
BILIRUB SERPL-MCNC: 0.5 MG/DL (ref 0–1.2)
BUN SERPL-MCNC: 18 MG/DL (ref 8–23)
BUN/CREAT SERPL: 16.8 (ref 7–25)
CALCIUM SERPL-MCNC: 9.8 MG/DL (ref 8.6–10.5)
CHLORIDE SERPL-SCNC: 105 MMOL/L (ref 98–107)
CHOLEST SERPL-MCNC: 143 MG/DL (ref 0–200)
CO2 SERPL-SCNC: 26.7 MMOL/L (ref 22–29)
CREAT SERPL-MCNC: 1.07 MG/DL (ref 0.76–1.27)
EGFRCR SERPLBLD CKD-EPI 2021: 72.4 ML/MIN/1.73
EOSINOPHIL # BLD AUTO: 0.22 10*3/MM3 (ref 0–0.4)
EOSINOPHIL NFR BLD AUTO: 3.9 % (ref 0.3–6.2)
ERYTHROCYTE [DISTWIDTH] IN BLOOD BY AUTOMATED COUNT: 12.6 % (ref 12.3–15.4)
GLOBULIN SER CALC-MCNC: 3.2 GM/DL
GLUCOSE SERPL-MCNC: 102 MG/DL (ref 65–99)
HBA1C MFR BLD: 6.3 % (ref 4.8–5.6)
HCT VFR BLD AUTO: 44.4 % (ref 37.5–51)
HDLC SERPL-MCNC: 49 MG/DL (ref 40–60)
HGB BLD-MCNC: 14.7 G/DL (ref 13–17.7)
IMM GRANULOCYTES # BLD AUTO: 0.01 10*3/MM3 (ref 0–0.05)
IMM GRANULOCYTES NFR BLD AUTO: 0.2 % (ref 0–0.5)
LDLC SERPL CALC-MCNC: 77 MG/DL (ref 0–100)
LYMPHOCYTES # BLD AUTO: 1.72 10*3/MM3 (ref 0.7–3.1)
LYMPHOCYTES NFR BLD AUTO: 30.2 % (ref 19.6–45.3)
MCH RBC QN AUTO: 30.6 PG (ref 26.6–33)
MCHC RBC AUTO-ENTMCNC: 33.1 G/DL (ref 31.5–35.7)
MCV RBC AUTO: 92.5 FL (ref 79–97)
MONOCYTES # BLD AUTO: 0.59 10*3/MM3 (ref 0.1–0.9)
MONOCYTES NFR BLD AUTO: 10.4 % (ref 5–12)
NEUTROPHILS # BLD AUTO: 3.08 10*3/MM3 (ref 1.7–7)
NEUTROPHILS NFR BLD AUTO: 54.1 % (ref 42.7–76)
NRBC BLD AUTO-RTO: 0 /100 WBC (ref 0–0.2)
PLATELET # BLD AUTO: 249 10*3/MM3 (ref 140–450)
POTASSIUM SERPL-SCNC: 4.6 MMOL/L (ref 3.5–5.2)
PROT SERPL-MCNC: 7.4 G/DL (ref 6–8.5)
RBC # BLD AUTO: 4.8 10*6/MM3 (ref 4.14–5.8)
SODIUM SERPL-SCNC: 141 MMOL/L (ref 136–145)
TRIGL SERPL-MCNC: 87 MG/DL (ref 0–150)
TSH SERPL DL<=0.005 MIU/L-ACNC: 4.16 UIU/ML (ref 0.27–4.2)
VLDLC SERPL CALC-MCNC: 17 MG/DL (ref 5–40)
WBC # BLD AUTO: 5.69 10*3/MM3 (ref 3.4–10.8)

## 2025-06-29 DIAGNOSIS — E78.2 MIXED HYPERLIPIDEMIA: ICD-10-CM

## 2025-06-30 ENCOUNTER — OFFICE VISIT (OUTPATIENT)
Dept: FAMILY MEDICINE CLINIC | Facility: CLINIC | Age: 75
End: 2025-06-30
Payer: MEDICARE

## 2025-06-30 VITALS
TEMPERATURE: 97.1 F | WEIGHT: 229 LBS | HEART RATE: 73 BPM | SYSTOLIC BLOOD PRESSURE: 112 MMHG | BODY MASS INDEX: 31.02 KG/M2 | OXYGEN SATURATION: 97 % | DIASTOLIC BLOOD PRESSURE: 78 MMHG | HEIGHT: 72 IN

## 2025-06-30 DIAGNOSIS — E55.9 VITAMIN D DEFICIENCY: ICD-10-CM

## 2025-06-30 DIAGNOSIS — E78.2 MIXED HYPERLIPIDEMIA: Primary | ICD-10-CM

## 2025-06-30 DIAGNOSIS — E03.9 ACQUIRED HYPOTHYROIDISM: ICD-10-CM

## 2025-06-30 DIAGNOSIS — E11.9 TYPE 2 DIABETES MELLITUS WITHOUT COMPLICATION, WITHOUT LONG-TERM CURRENT USE OF INSULIN: ICD-10-CM

## 2025-06-30 DIAGNOSIS — E66.09 EXOGENOUS OBESITY: ICD-10-CM

## 2025-06-30 DIAGNOSIS — M17.11 PRIMARY OSTEOARTHRITIS OF RIGHT KNEE: ICD-10-CM

## 2025-06-30 DIAGNOSIS — Z85.46 HISTORY OF PROSTATE CANCER: ICD-10-CM

## 2025-06-30 PROCEDURE — 3044F HG A1C LEVEL LT 7.0%: CPT | Performed by: FAMILY MEDICINE

## 2025-06-30 PROCEDURE — 1160F RVW MEDS BY RX/DR IN RCRD: CPT | Performed by: FAMILY MEDICINE

## 2025-06-30 PROCEDURE — 99214 OFFICE O/P EST MOD 30 MIN: CPT | Performed by: FAMILY MEDICINE

## 2025-06-30 PROCEDURE — 1125F AMNT PAIN NOTED PAIN PRSNT: CPT | Performed by: FAMILY MEDICINE

## 2025-06-30 PROCEDURE — 1159F MED LIST DOCD IN RCRD: CPT | Performed by: FAMILY MEDICINE

## 2025-06-30 RX ORDER — EZETIMIBE 10 MG/1
10 TABLET ORAL DAILY
Qty: 90 TABLET | Refills: 1 | Status: SHIPPED | OUTPATIENT
Start: 2025-06-30

## 2025-07-01 NOTE — PROGRESS NOTES
Subjective   Krishna Le is a 74 y.o. male with   Chief Complaint   Patient presents with    Hyperlipidemia     Labs prior    Diabetes    Vitamin D Deficiency    Hypothyroidism   .    Hyperlipidemia  Exacerbating diseases include hypothyroidism.   Diabetes  Hypothyroidism  His past medical history is significant for hyperlipidemia.      74-year-old white male with multiple medical issues here for further medical management.  Patient with known history of hyperlipidemia, type II non-insulin-dependent diabetes mellitus as well as hypothyroidism.  He also has history of vitamin D deficiency and suffers from prostate cancer.  Apparently there has been a scan performed showing a questionable site in his remaining prostate.  He will be undergoing a biopsy with Dr. Weir of the urology service.  This will be done via a transperineal approach and under imaging guided needle biopsy.  He is also scheduled to have his knee replaced next month following the above biopsy.  He has a follow-up appoint with the urologist actually 1 day after his knee is replaced.  Dr. Hernandez of the orthopedic service will do the knee replacement.  Current medications include atorvastatin at 80 mg daily in combination with Zetia 10 mg daily.  Patient is also using diclofenac at 50 mg on a as needed basis for his knee.  Fenofibrate is also used at 160 mg daily and metformin at 1000 mg twice daily is also employed.  Patient is also using Dupixent on an injectable basis.  The following portions of the patient's history were reviewed and updated as appropriate: allergies, current medications, past family history, past medical history, past social history, past surgical history and problem list.    Review of Systems   Endocrine:        Exogenous obesity, vitamin D deficiency, type II non-insulin-dependent diabetes mellitus, hypothyroidism   Genitourinary:         Prostate cancer, erectile dysfunction   Musculoskeletal:  Positive for arthralgias  (Right knee).       Objective     Vitals:    06/30/25 1352   BP: 112/78   Pulse: 73   Temp: 97.1 °F (36.2 °C)   SpO2: 97%       Recent Results (from the past 4 weeks)   Comprehensive metabolic panel    Collection Time: 06/20/25  8:14 AM    Specimen: Blood   Result Value Ref Range    Glucose 102 (H) 65 - 99 mg/dL    BUN 18.0 8.0 - 23.0 mg/dL    Creatinine 1.07 0.76 - 1.27 mg/dL    EGFR Result 72.4 >60.0 mL/min/1.73    BUN/Creatinine Ratio 16.8 7.0 - 25.0    Sodium 141 136 - 145 mmol/L    Potassium 4.6 3.5 - 5.2 mmol/L    Chloride 105 98 - 107 mmol/L    Total CO2 26.7 22.0 - 29.0 mmol/L    Calcium 9.8 8.6 - 10.5 mg/dL    Total Protein 7.4 6.0 - 8.5 g/dL    Albumin 4.2 3.5 - 5.2 g/dL    Globulin 3.2 gm/dL    A/G Ratio 1.3 g/dL    Total Bilirubin 0.5 0.0 - 1.2 mg/dL    Alkaline Phosphatase 31 (L) 39 - 117 U/L    AST (SGOT) 31 1 - 40 U/L    ALT (SGPT) 37 1 - 41 U/L   Vitamin D 25 hydroxy    Collection Time: 06/20/25  8:14 AM    Specimen: Blood   Result Value Ref Range    25 Hydroxy, Vitamin D 83.3 30.0 - 100.0 ng/ml   TSH    Collection Time: 06/20/25  8:14 AM    Specimen: Blood   Result Value Ref Range    TSH 4.160 0.270 - 4.200 uIU/mL   Lipid panel    Collection Time: 06/20/25  8:14 AM    Specimen: Blood   Result Value Ref Range    Total Cholesterol 143 0 - 200 mg/dL    Triglycerides 87 0 - 150 mg/dL    HDL Cholesterol 49 40 - 60 mg/dL    VLDL Cholesterol Uzair 17 5 - 40 mg/dL    LDL Chol Calc (NIH) 77 0 - 100 mg/dL   Hemoglobin A1c    Collection Time: 06/20/25  8:14 AM    Specimen: Blood   Result Value Ref Range    Hemoglobin A1C 6.30 (H) 4.80 - 5.60 %   CBC w AUTO Differential    Collection Time: 06/20/25  8:14 AM    Specimen: Blood   Result Value Ref Range    WBC 5.69 3.40 - 10.80 10*3/mm3    RBC 4.80 4.14 - 5.80 10*6/mm3    Hemoglobin 14.7 13.0 - 17.7 g/dL    Hematocrit 44.4 37.5 - 51.0 %    MCV 92.5 79.0 - 97.0 fL    MCH 30.6 26.6 - 33.0 pg    MCHC 33.1 31.5 - 35.7 g/dL    RDW 12.6 12.3 - 15.4 %    Platelets 249 140  - 450 10*3/mm3    Neutrophil Rel % 54.1 42.7 - 76.0 %    Lymphocyte Rel % 30.2 19.6 - 45.3 %    Monocyte Rel % 10.4 5.0 - 12.0 %    Eosinophil Rel % 3.9 0.3 - 6.2 %    Basophil Rel % 1.2 0.0 - 1.5 %    Neutrophils Absolute 3.08 1.70 - 7.00 10*3/mm3    Lymphocytes Absolute 1.72 0.70 - 3.10 10*3/mm3    Monocytes Absolute 0.59 0.10 - 0.90 10*3/mm3    Eosinophils Absolute 0.22 0.00 - 0.40 10*3/mm3    Basophils Absolute 0.07 0.00 - 0.20 10*3/mm3    Immature Granulocyte Rel % 0.2 0.0 - 0.5 %    Immature Grans Absolute 0.01 0.00 - 0.05 10*3/mm3    nRBC 0.0 0.0 - 0.2 /100 WBC       Physical Exam  Vitals and nursing note reviewed.   Constitutional:       Appearance: Normal appearance. He is well-developed and well-groomed. He is obese.      Comments: He is obesity with a BMI of 31.1   HENT:      Head: Normocephalic and atraumatic.   Neck:      Thyroid: No thyroid mass or thyromegaly.      Vascular: Normal carotid pulses. No carotid bruit.      Trachea: Trachea and phonation normal.   Cardiovascular:      Rate and Rhythm: Normal rate and regular rhythm.      Heart sounds: Normal heart sounds. No murmur heard.     No friction rub. No gallop.   Pulmonary:      Effort: Pulmonary effort is normal. No respiratory distress.      Breath sounds: Normal breath sounds. No decreased breath sounds, wheezing, rhonchi or rales.   Musculoskeletal:      Cervical back: Neck supple.   Lymphadenopathy:      Cervical: No cervical adenopathy.   Skin:     General: Skin is warm and dry.      Findings: No rash.   Neurological:      Mental Status: He is alert and oriented to person, place, and time.   Psychiatric:         Attention and Perception: Attention and perception normal.         Mood and Affect: Mood and affect normal.         Speech: Speech normal.         Behavior: Behavior normal. Behavior is cooperative.         Thought Content: Thought content normal.         Cognition and Memory: Cognition and memory normal.         Judgment: Judgment  normal.         Assessment & Plan   Diagnoses and all orders for this visit:    1. Mixed hyperlipidemia (Primary)  -     Lipid panel; Future  -     Comprehensive metabolic panel; Future  -     TSH; Future  -     Vitamin D 25 hydroxy; Future  -     Hemoglobin A1c; Future  -     CBC w AUTO Differential; Future    2. Type 2 diabetes mellitus without complication, without long-term current use of insulin  -     Lipid panel; Future  -     Comprehensive metabolic panel; Future  -     TSH; Future  -     Vitamin D 25 hydroxy; Future  -     Hemoglobin A1c; Future  -     CBC w AUTO Differential; Future    3. Acquired hypothyroidism  -     Lipid panel; Future  -     Comprehensive metabolic panel; Future  -     TSH; Future  -     Vitamin D 25 hydroxy; Future  -     Hemoglobin A1c; Future  -     CBC w AUTO Differential; Future    4. Exogenous obesity  -     Lipid panel; Future  -     Comprehensive metabolic panel; Future  -     TSH; Future  -     Vitamin D 25 hydroxy; Future  -     Hemoglobin A1c; Future  -     CBC w AUTO Differential; Future    5. Vitamin D deficiency  -     Lipid panel; Future  -     Comprehensive metabolic panel; Future  -     TSH; Future  -     Vitamin D 25 hydroxy; Future  -     Hemoglobin A1c; Future  -     CBC w AUTO Differential; Future    6. History of prostate cancer  -     Lipid panel; Future  -     Comprehensive metabolic panel; Future  -     TSH; Future  -     Vitamin D 25 hydroxy; Future  -     Hemoglobin A1c; Future  -     CBC w AUTO Differential; Future    7. Primary osteoarthritis of right knee  -     Lipid panel; Future  -     Comprehensive metabolic panel; Future  -     TSH; Future  -     Vitamin D 25 hydroxy; Future  -     Hemoglobin A1c; Future  -     CBC w AUTO Differential; Future        Return in about 6 months (around 12/30/2025) for Recheck.

## 2025-07-21 NOTE — PROGRESS NOTES
[facility]     Referring Provider: No ref. provider found     Chief complaint: BRCA2     HPI: Presenting to the office today for routine follow-up.  He was previously seeing Dr. Hirsch in the high-risk clinic and since his correction his care has been transferred to me  Currently seeing  for prostate and pancreatic screening, seeing derm    I personally reviewed her records and summarized her relevant breast history/imagin/10/2024 bilateral screening mammogram at [facility]  Breast Density: The breasts are almost entirely fatty.  There are no  suspicious masses, calcifications, or areas of distortion seen.  IMPRESSION:  There are no suspicious findings for malignancy at this time. Negative  screening mammogram.  BI-RADS CATEGORY 1    he has a personal history of  prostate cancer       Mother  of pancreatic cancer  he denies any family history of breast or ovarian cancer .          Review of Systems - Oncology    Medications:    Current Outpatient Medications:     atorvastatin (LIPITOR) 80 MG tablet, TAKE 1 TABLET BY MOUTH DAILY, Disp: 90 tablet, Rfl: 1    CALCIUM ACETATE-MAGNESIUM CARB PO, Take  by mouth., Disp: , Rfl:     Cetirizine HCl 10 MG capsule, Take  by mouth., Disp: , Rfl:     Cholecalciferol (VITAMIN D3) 125 MCG (5000 UT) capsule capsule, Take 2 capsules by mouth Daily., Disp: , Rfl:     diclofenac (VOLTAREN) 50 MG EC tablet, Take 1 tablet by mouth 2 (Two) Times a Day., Disp: , Rfl:     Dupilumab 300 MG/2ML solution pen-injector, Inject 2 mL under the skin into the appropriate area as directed. 2 injections twice per month, Disp: , Rfl:     ezetimibe (ZETIA) 10 MG tablet, TAKE 1 TABLET BY MOUTH DAILY, Disp: 90 tablet, Rfl: 1    fenofibrate 160 MG tablet, TAKE 1 TABLET BY MOUTH DAILY, Disp: 90 tablet, Rfl: 1    glucose blood test strip, Use as instructed to check blood sugar once daily, Disp: 100 each, Rfl: 4    glucose monitor monitoring kit, Use to check blood sugar once daily, Disp: 1  each, Rfl: 0    Lancets Misc. kit, Use stylus and lancet to check blood sugar once daily, Disp: 100 each, Rfl: 4    metFORMIN (GLUCOPHAGE) 1000 MG tablet, TAKE 1 TABLET BY MOUTH TWICE A DAY WITH A MEAL, Disp: 180 tablet, Rfl: 1    Milk Thistle 1000 MG capsule, Take  by mouth., Disp: , Rfl:     Misc Natural Products (GLUCOSAMINE CHOND MSM FORMULA PO), Take  by mouth., Disp: , Rfl:     NON FORMULARY, NON-CONTROL PAIN CREAM (DICLOF4%/LIDO5%/TOPIRA2%/CLONID0.2%), Disp: , Rfl:     QUERCETIN PO, Take 1,000 mg by mouth., Disp: , Rfl:     Sodium Fluoride 5000 Sensitive 1.1-5 % paste, , Disp: , Rfl:     vitamin C (ASCORBIC ACID) 500 MG tablet, Take 1 tablet by mouth Daily., Disp: , Rfl:     Allergies:  Allergies   Allergen Reactions    Shrimp Anaphylaxis    Shrimp Flavor Agent (Non-Screening) Anaphylaxis       Medical history:  Past Medical History:   Diagnosis Date    Arthritis     Basal cell carcinoma     neck and shoulder    Cataract     Colon polyp     Diabetes     Type 2, boarderline. well controlled    Elevated cholesterol     Hay fever     Hearing impaired person, bilateral     wears hearing aids    Prostate cancer 11/6/2023       Surgical History:  Past Surgical History:   Procedure Laterality Date    CATARACT EXTRACTION Bilateral     CERVICAL FUSION  2021    6, 7, 8    COLONOSCOPY      COLONOSCOPY N/A 06/29/2016    Procedure: COLONOSCOPY; POLYPECTOMY;  Surgeon: Colin Rojas MD;  Location: [facility] OR;  Service:     COLONOSCOPY N/A 08/16/2019    Procedure: COLONOSCOPY, polypectomy;  Surgeon: Colin Rojas MD;  Location: [facility] OR;  Service: Gastroenterology    COLONOSCOPY W/ POLYPECTOMY N/A 8/21/2024    Procedure: COLONOSCOPY WITH POLYPECTOMY;  Surgeon: Colin Rojas MD;  Location: [facility] OR;  Service: Gastroenterology;  Laterality: N/A;  Rectal Polyp x3 (forcep); Descending Polyp x2 (forcep)    HIGH INTENSITY FOCUSED ULTRASOUND TREATMENT PROSTATE N/A 9/22/2023    Procedure:  HIGH INTENSITY FOCUSED ULTRASOUND WHOLE GLAND WITH SUPRAPUBIC CATHETER;  Surgeon: Ced Sheehan Jr., MD;  Location: [facility];  Service: Urology;  Laterality: N/A;       Family History:  Family History   Problem Relation Age of Onset    Cancer Mother         pancreatic    Cancer Father         polysythemia/skin cancer    Malig Hyperthermia Neg Hx     Breast cancer Neg Hx        Social History:   Social History     Socioeconomic History    Marital status:    Tobacco Use    Smoking status: Never    Smokeless tobacco: Never   Vaping Use    Vaping status: Never Used   Substance and Sexual Activity    Alcohol use: Yes     Alcohol/week: 2.0 standard drinks of alcohol     Types: 2 Glasses of wine per week     Comment: I PER DAY    Drug use: No    Sexual activity: Defer     Patient drinks 2 servings of caffeine per day.           Physical Exam  There were no vitals filed for this visit.  ECOG 0 - Asymptomatic  General: NAD, well appearing  Psych: a&o x 3, normal mood and affect  Eyes: EOMI, no scleral icterus  ENMT: neck supple without masses or thyromegaly, mucus membranes moist  Resp: normal effort, CTAB  CV: RRR, no murmurs, no edema   GI: soft, NT, ND  MSK: normal gait, normal ROM in bilateral shoulders  Lymph nodes: no cervical, supraclavicular or axillary lymphadenopathy  Breast: symmetric,   Right: No visible abnormalities on inspection while seated, with arms raised or hands on hips. No masses, skin changes, or nipple abnormalities.  Left: No visible abnormalities on inspection while seated, with arms raised or hands on hips. No masses, skin changes, or nipple abnormalities.      Assessment:    BRCA2 positive    Discuss  BRCA2  Primary breast cancer   Absolute risk: 55%-69%   Strength of evidence of association with cancer: Very strong   Contralateral breast canceri   20-year cumulative risk: 25%   15-year cumulative risk in premenopausal women: >20%   Strength of evidence of association with cancer:  Strong  Male breast cancer   Absolute risk: 1.8%-7.1% by age 70 y   Strength of evidence of association with cancer: Strong   Management:   Breast self-exam training and education starting at age 35 years.   Clinical breast exam, every 12 months, starting at age 35 years.   Consider annual mammogram, especially for those with BRCA2 P/LP variants in whom the lifetime risk of breast cancer is up to 7%,   starting at age 50 or 10 years before the earliest known male breast cancer in the family (whichever comes first).g,h  Variant-Positive Management   Strength of evidence of association   with cancer: Very strong  Pancreatic cancer   Absolute risk: 5%-10%   Strength of evidence of association with cancer:   Very strong  Prostate cancer   Absolute risk: 19%-61%   Strength of evidence of association with cancer:      Plan:  Exam in 1 year       ALBIN Cherry    I have spent 35 mins in face to face time with the patient and in chart review.    CC:  No ref. provider found  Papo Anand DO    EMR Dragon/transcription disclaimer:  Dictated using Dragon dictation

## 2025-07-22 ENCOUNTER — OFFICE VISIT (OUTPATIENT)
Dept: SURGERY | Facility: CLINIC | Age: 75
End: 2025-07-22
Payer: MEDICARE

## 2025-07-22 VITALS
WEIGHT: 231.8 LBS | DIASTOLIC BLOOD PRESSURE: 82 MMHG | BODY MASS INDEX: 31.4 KG/M2 | SYSTOLIC BLOOD PRESSURE: 140 MMHG | HEIGHT: 72 IN | HEART RATE: 96 BPM | OXYGEN SATURATION: 97 %

## 2025-07-22 DIAGNOSIS — Z15.09 BRCA2 GENE MUTATION POSITIVE IN MALE: Primary | ICD-10-CM

## 2025-07-22 DIAGNOSIS — Z15.01 BRCA2 GENE MUTATION POSITIVE IN MALE: Primary | ICD-10-CM

## 2025-07-22 DIAGNOSIS — Z15.03 BRCA2 GENE MUTATION POSITIVE IN MALE: Primary | ICD-10-CM

## 2025-07-22 PROCEDURE — 99214 OFFICE O/P EST MOD 30 MIN: CPT | Performed by: NURSE PRACTITIONER

## 2025-07-22 PROCEDURE — 1160F RVW MEDS BY RX/DR IN RCRD: CPT | Performed by: NURSE PRACTITIONER

## 2025-07-22 PROCEDURE — 1159F MED LIST DOCD IN RCRD: CPT | Performed by: NURSE PRACTITIONER

## 2025-08-01 ENCOUNTER — TRANSCRIBE ORDERS (OUTPATIENT)
Dept: ADMINISTRATIVE | Facility: HOSPITAL | Age: 75
End: 2025-08-01
Payer: MEDICARE

## 2025-08-01 ENCOUNTER — LAB (OUTPATIENT)
Dept: LAB | Facility: HOSPITAL | Age: 75
End: 2025-08-01
Payer: MEDICARE

## 2025-08-01 ENCOUNTER — HOSPITAL ENCOUNTER (OUTPATIENT)
Dept: CARDIOLOGY | Facility: HOSPITAL | Age: 75
Discharge: HOME | End: 2025-08-01
Payer: MEDICARE

## 2025-08-01 DIAGNOSIS — C61 PROSTATE CANCER: ICD-10-CM

## 2025-08-01 DIAGNOSIS — C61 PROSTATE CANCER: Primary | ICD-10-CM

## 2025-08-01 LAB
ANION GAP SERPL CALCULATED.3IONS-SCNC: 9.1 MMOL/L (ref 5–15)
BASOPHILS # BLD AUTO: 0.06 10*3/MM3 (ref 0–0.2)
BASOPHILS NFR BLD AUTO: 1 % (ref 0–1.5)
BUN SERPL-MCNC: 15 MG/DL (ref 8–23)
BUN/CREAT SERPL: 13.5 (ref 7–25)
CALCIUM SPEC-SCNC: 9.7 MG/DL (ref 8.6–10.5)
CHLORIDE SERPL-SCNC: 107 MMOL/L (ref 98–107)
CO2 SERPL-SCNC: 24.9 MMOL/L (ref 22–29)
CREAT SERPL-MCNC: 1.11 MG/DL (ref 0.76–1.27)
DEPRECATED RDW RBC AUTO: 43.1 FL (ref 37–54)
EGFRCR SERPLBLD CKD-EPI 2021: 68.8 ML/MIN/1.73
EOSINOPHIL # BLD AUTO: 0.18 10*3/MM3 (ref 0–0.4)
EOSINOPHIL NFR BLD AUTO: 3 % (ref 0.3–6.2)
ERYTHROCYTE [DISTWIDTH] IN BLOOD BY AUTOMATED COUNT: 12.8 % (ref 12.3–15.4)
GLUCOSE SERPL-MCNC: 117 MG/DL (ref 65–99)
HBA1C MFR BLD: 6.5 % (ref 4.8–5.6)
HCT VFR BLD AUTO: 43.2 % (ref 37.5–51)
HGB BLD-MCNC: 14.4 G/DL (ref 13–17.7)
IMM GRANULOCYTES # BLD AUTO: 0.01 10*3/MM3 (ref 0–0.05)
IMM GRANULOCYTES NFR BLD AUTO: 0.2 % (ref 0–0.5)
LYMPHOCYTES # BLD AUTO: 2.03 10*3/MM3 (ref 0.7–3.1)
LYMPHOCYTES NFR BLD AUTO: 33.9 % (ref 19.6–45.3)
MCH RBC QN AUTO: 31 PG (ref 26.6–33)
MCHC RBC AUTO-ENTMCNC: 33.3 G/DL (ref 31.5–35.7)
MCV RBC AUTO: 92.9 FL (ref 79–97)
MONOCYTES # BLD AUTO: 0.63 10*3/MM3 (ref 0.1–0.9)
MONOCYTES NFR BLD AUTO: 10.5 % (ref 5–12)
NEUTROPHILS NFR BLD AUTO: 3.07 10*3/MM3 (ref 1.7–7)
NEUTROPHILS NFR BLD AUTO: 51.4 % (ref 42.7–76)
NRBC BLD AUTO-RTO: 0 /100 WBC (ref 0–0.2)
PLATELET # BLD AUTO: 264 10*3/MM3 (ref 140–450)
PMV BLD AUTO: 10.8 FL (ref 6–12)
POTASSIUM SERPL-SCNC: 4.2 MMOL/L (ref 3.5–5.2)
QT INTERVAL: 399 MS
QTC INTERVAL: 446 MS
RBC # BLD AUTO: 4.65 10*6/MM3 (ref 4.14–5.8)
SODIUM SERPL-SCNC: 141 MMOL/L (ref 136–145)
WBC NRBC COR # BLD AUTO: 5.98 10*3/MM3 (ref 3.4–10.8)

## 2025-08-01 PROCEDURE — 85025 COMPLETE CBC W/AUTO DIFF WBC: CPT

## 2025-08-01 PROCEDURE — 80048 BASIC METABOLIC PNL TOTAL CA: CPT

## 2025-08-01 PROCEDURE — 93005 ELECTROCARDIOGRAM TRACING: CPT

## 2025-08-01 PROCEDURE — 36415 COLL VENOUS BLD VENIPUNCTURE: CPT

## 2025-08-01 PROCEDURE — 83036 HEMOGLOBIN GLYCOSYLATED A1C: CPT

## 2025-08-04 ENCOUNTER — TRANSCRIBE ORDERS (OUTPATIENT)
Age: 75
End: 2025-08-04
Payer: MEDICARE

## 2025-08-04 DIAGNOSIS — R94.31 ABNORMAL EKG: Primary | ICD-10-CM

## 2025-08-12 ENCOUNTER — OFFICE VISIT (OUTPATIENT)
Dept: CARDIOLOGY | Age: 75
End: 2025-08-12
Payer: MEDICARE

## 2025-08-12 ENCOUNTER — LAB (OUTPATIENT)
Dept: LAB | Facility: HOSPITAL | Age: 75
End: 2025-08-12
Payer: MEDICARE

## 2025-08-12 VITALS
OXYGEN SATURATION: 95 % | WEIGHT: 231.8 LBS | DIASTOLIC BLOOD PRESSURE: 84 MMHG | SYSTOLIC BLOOD PRESSURE: 130 MMHG | HEIGHT: 72 IN | BODY MASS INDEX: 31.4 KG/M2 | HEART RATE: 68 BPM

## 2025-08-12 DIAGNOSIS — R06.02 SOB (SHORTNESS OF BREATH): Primary | ICD-10-CM

## 2025-08-12 DIAGNOSIS — Z01.810 PREOPERATIVE CARDIOVASCULAR EXAMINATION: ICD-10-CM

## 2025-08-12 DIAGNOSIS — R06.02 SOB (SHORTNESS OF BREATH): ICD-10-CM

## 2025-08-12 DIAGNOSIS — I49.3 PVC (PREMATURE VENTRICULAR CONTRACTION): ICD-10-CM

## 2025-08-12 DIAGNOSIS — E78.2 MIXED HYPERLIPIDEMIA: ICD-10-CM

## 2025-08-12 LAB
MAGNESIUM SERPL-MCNC: 1.6 MG/DL (ref 1.6–2.4)
NT-PROBNP SERPL-MCNC: 48.6 PG/ML (ref 0–1800)

## 2025-08-12 PROCEDURE — 83735 ASSAY OF MAGNESIUM: CPT

## 2025-08-12 PROCEDURE — 36415 COLL VENOUS BLD VENIPUNCTURE: CPT

## 2025-08-12 PROCEDURE — 83880 ASSAY OF NATRIURETIC PEPTIDE: CPT

## 2025-08-17 ENCOUNTER — RESULTS FOLLOW-UP (OUTPATIENT)
Dept: CARDIOLOGY | Age: 75
End: 2025-08-17
Payer: MEDICARE

## (undated) DEVICE — ADAPT CLN BIOGUARD AIR/H2O DISP

## (undated) DEVICE — GOWN,PREVENTION PLUS,XLARGE,STERILE: Brand: MEDLINE

## (undated) DEVICE — SINGLE-USE BIOPSY FORCEPS: Brand: RADIAL JAW 4

## (undated) DEVICE — SOL IRR H2O BTL 1000ML STRL

## (undated) DEVICE — Device

## (undated) DEVICE — MASK,FACE,FLUID RESIST,SHLD,EARLOOP: Brand: MEDLINE

## (undated) DEVICE — HYBRID TUBING/CAP SET FOR OLYMPUS® SCOPES: Brand: ERBE

## (undated) DEVICE — NDL HYPO ECLPS SFTY 22G 1 1/2IN

## (undated) DEVICE — GOWN ISOL W/THUMB UNIV BLU BX/15

## (undated) DEVICE — SPNG GZ WOVN 4X4IN 12PLY 10/BX STRL

## (undated) DEVICE — VIAL FORMALIN CAP 10P 40ML

## (undated) DEVICE — SYR LL TP 10ML STRL

## (undated) DEVICE — GLV SURG SENSICARE PI MIC PF SZ8 LF STRL

## (undated) DEVICE — SUCTION CANISTER, 3000CC,SAFELINER: Brand: DEROYAL

## (undated) DEVICE — PLUG CATH W/CAP

## (undated) DEVICE — JACKT LAB F/R KNIT CUFF/COLR XLG BLU

## (undated) DEVICE — APPL CHLORAPREP HI/LITE 26ML ORNG

## (undated) DEVICE — URINE LEG BAG WITH EXTENSION TUBE: Brand: DOVER

## (undated) DEVICE — KT INTRO FOL CATH SUPRAPUB 16F ORNG

## (undated) DEVICE — KT ORCA ORCAPOD DISP STRL

## (undated) DEVICE — KT DISP PROST SONABLATE PROB/ABL

## (undated) DEVICE — ENDOSCOPY PORT CONNECTOR FOR OLYMPUS® SCOPES: Brand: ERBE

## (undated) DEVICE — GLV SURG SENSICARE MICRO PF LF 7.5 STRL

## (undated) DEVICE — BOWL PLSTC LG 32OZ BLU STRL

## (undated) DEVICE — INTENDED FOR TISSUE SEPARATION, AND OTHER PROCEDURES THAT REQUIRE A SHARP SURGICAL BLADE TO PUNCTURE OR CUT.: Brand: BARD-PARKER ® STAINLESS STEEL BLADES

## (undated) DEVICE — SYRINGE,PISTON,IRRIGATION,60ML,STERILE: Brand: MEDLINE

## (undated) DEVICE — TOTAL TRAY, 16FR 10ML SIL FOLEY, URN: Brand: MEDLINE

## (undated) DEVICE — CONTAINER,SPECIMEN,OR STERILE,4OZ: Brand: MEDLINE

## (undated) DEVICE — LINER SURG CANSTR SXN S/RIGD 1500CC

## (undated) DEVICE — SYR LL W/SCALE/MARK 3ML STRL

## (undated) DEVICE — BW-412T DISP COMBO CLEANING BRUSH: Brand: SINGLE USE COMBINATION CLEANING BRUSH

## (undated) DEVICE — DRAPE,REIN 53X77,STERILE: Brand: MEDLINE

## (undated) DEVICE — SYR LL 3CC

## (undated) DEVICE — SPONGE,DRAIN,NONWVN,4"X4",6PLY,STRL,LF: Brand: MEDLINE

## (undated) DEVICE — GLV SURG SENSICARE W/ALOE PF LF 7 STRL

## (undated) DEVICE — Device: Brand: DEFENDO AIR/WATER/SUCTION AND BIOPSY VALVE

## (undated) DEVICE — TOWEL,OR,DSP,ST,BLUE,STD,4/PK,20PK/CS: Brand: MEDLINE

## (undated) DEVICE — FRCP BX RADJAW4 NDL 2.8 240CM LG OG BX40

## (undated) DEVICE — INTENDED USE FOR SURGICAL MARKING ON INTACT SKIN, ALSO PROVIDES A PERMANENT METHOD OF IDENTIFYING OBJECTS IN THE OPERATING ROOM: Brand: WRITESITE® REGULAR TIP SKIN MARKER